# Patient Record
Sex: FEMALE | Race: WHITE | NOT HISPANIC OR LATINO | Employment: OTHER | URBAN - METROPOLITAN AREA
[De-identification: names, ages, dates, MRNs, and addresses within clinical notes are randomized per-mention and may not be internally consistent; named-entity substitution may affect disease eponyms.]

---

## 2023-06-13 DIAGNOSIS — I10 HYPERTENSION, UNSPECIFIED TYPE: Primary | ICD-10-CM

## 2023-06-13 DIAGNOSIS — D64.9 ANEMIA, UNSPECIFIED TYPE: ICD-10-CM

## 2023-06-13 DIAGNOSIS — E13.69 OTHER SPECIFIED DIABETES MELLITUS WITH OTHER SPECIFIED COMPLICATION, UNSPECIFIED WHETHER LONG TERM INSULIN USE (MULTI): ICD-10-CM

## 2023-06-13 RX ORDER — METFORMIN HYDROCHLORIDE 500 MG/1
500 TABLET ORAL 2 TIMES DAILY
Qty: 180 TABLET | Refills: 0 | Status: CANCELLED | OUTPATIENT
Start: 2023-06-13 | End: 2023-09-11

## 2023-06-13 RX ORDER — FOLIC ACID 1 MG/1
1 TABLET ORAL DAILY
COMMUNITY

## 2023-06-13 RX ORDER — FOLIC ACID 1 MG/1
1 TABLET ORAL DAILY
Qty: 90 TABLET | Refills: 0 | Status: CANCELLED | OUTPATIENT
Start: 2023-06-13 | End: 2023-09-11

## 2023-06-13 RX ORDER — FUROSEMIDE 20 MG/1
20 TABLET ORAL DAILY
COMMUNITY

## 2023-06-13 RX ORDER — FUROSEMIDE 20 MG/1
20 TABLET ORAL DAILY
Qty: 90 TABLET | Refills: 0 | Status: CANCELLED | OUTPATIENT
Start: 2023-06-13 | End: 2023-09-11

## 2023-06-13 RX ORDER — METFORMIN HYDROCHLORIDE 500 MG/1
500 TABLET ORAL 2 TIMES DAILY
COMMUNITY
Start: 2023-05-25

## 2023-09-12 ENCOUNTER — PATIENT OUTREACH (OUTPATIENT)
Dept: CARE COORDINATION | Facility: CLINIC | Age: 88
End: 2023-09-12

## 2023-09-12 NOTE — PROGRESS NOTES
Outreach call to patient to support a smooth transition of care from recent admission.  Spoke with Phyllis, reviewed discharge medications, discharge instructions, assessed social needs, and provided education on importance of follow-up appointment with provider.  Will continue to monitor through transition period.    Engagement  Call Start Time: 1011 (9/12/2023 10:11 AM)    Medications  Medications reviewed with patient/caregiver?: Yes (9/12/2023 10:11 AM)  Is the patient having any side effects they believe may be caused by any medication additions or changes?: No (9/12/2023 10:11 AM)  Does the patient have all medications ordered at discharge?: Yes (9/12/2023 10:11 AM)  Care Management Interventions: No intervention needed (9/12/2023 10:11 AM)  Is the patient taking all medications as directed (includes completed medication regime)?: Yes (9/12/2023 10:11 AM)  Medication Comments: Discharge instructions has the patient on Aspirin 81mg but Phyllis states, she has never been on Aspirin. Lety to follow up with the PCP for medication clarification. (9/12/2023 10:11 AM)  Follow Up Tasks: Medication reconciliation issues (Family to follow up regarding aspirin) (9/12/2023 10:11 AM)    Appointments  Does the patient have a primary care provider?: Yes (Phyllis states, she will call and schedule an appointment today) (9/12/2023 10:11 AM)  Care Management Interventions: Advised patient to make appointment (9/12/2023 10:11 AM)    Self Management  What is the home health agency?: [facility] (9/12/2023 10:11 AM)  Has home health visited the patient within 72 hours of discharge?: Not applicable (9/12/2023 10:11 AM)  What Durable Medical Equipment (DME) was ordered?: not applicable (9/12/2023 10:11 AM)    Patient Teaching  Care Management Interventions: Reviewed instructions with patient (9/12/2023 10:11 AM)  What is the patient's perception of their health status since discharge?: Improving (9/12/2023  10:11 AM)  Is the patient/caregiver able to teach back the hierarchy of who to call/visit for symptoms/problems? PCP, Specialist, Home Health nurse, Urgent Care, ED, 911: Yes (9/12/2023 10:11 AM)    Queta Ayoub RN/CM

## 2023-09-25 ENCOUNTER — PATIENT OUTREACH (OUTPATIENT)
Dept: CARE COORDINATION | Facility: CLINIC | Age: 88
End: 2023-09-25

## 2023-09-25 NOTE — PROGRESS NOTES
Outreach call to patient following up on appointment with primary care provider.  Left voicemail message for Phyllis with name and contact number.  Will continue to follow.    Queta Ayoub RN/MARGARITA  [phone]

## 2023-10-31 ENCOUNTER — PATIENT OUTREACH (OUTPATIENT)
Dept: CARE COORDINATION | Facility: CLINIC | Age: 88
End: 2023-10-31

## 2023-10-31 NOTE — PROGRESS NOTES
Outreach call to patient to check in 30 days after hospital discharge to support smooth transition of care.  Left voicemail message for Phyllis with CM name and contact number. No additional outreach needed at this time.   Queta Ayoub RN

## 2024-06-25 ENCOUNTER — APPOINTMENT (OUTPATIENT)
Dept: RADIOLOGY | Facility: HOSPITAL | Age: 89
End: 2024-06-25
Payer: MEDICARE

## 2024-06-25 ENCOUNTER — APPOINTMENT (OUTPATIENT)
Dept: CARDIOLOGY | Facility: HOSPITAL | Age: 89
End: 2024-06-25
Payer: MEDICARE

## 2024-06-25 ENCOUNTER — HOSPITAL ENCOUNTER (EMERGENCY)
Facility: HOSPITAL | Age: 89
Discharge: OTHER INSTITUTION | End: 2024-06-26
Payer: MEDICARE

## 2024-06-25 DIAGNOSIS — J90 PLEURAL EFFUSION: ICD-10-CM

## 2024-06-25 DIAGNOSIS — N39.0 UTI (URINARY TRACT INFECTION) WITH PYURIA: ICD-10-CM

## 2024-06-25 DIAGNOSIS — R79.89 ELEVATED TROPONIN: Primary | ICD-10-CM

## 2024-06-25 DIAGNOSIS — J96.21 ACUTE AND CHRONIC RESPIRATORY FAILURE WITH HYPOXIA (MULTI): ICD-10-CM

## 2024-06-25 LAB
ALBUMIN SERPL BCP-MCNC: 3.7 G/DL (ref 3.4–5)
ALP SERPL-CCNC: 78 U/L (ref 33–136)
ALT SERPL W P-5'-P-CCNC: 17 U/L (ref 7–45)
ANION GAP BLDA CALCULATED.4IONS-SCNC: 10 MMO/L (ref 10–25)
ANION GAP SERPL CALC-SCNC: 14 MMOL/L (ref 10–20)
APPEARANCE UR: ABNORMAL
APTT PPP: 34 SECONDS (ref 27–38)
AST SERPL W P-5'-P-CCNC: 29 U/L (ref 9–39)
BASE EXCESS BLDA CALC-SCNC: 6.5 MMOL/L (ref -2–3)
BASOPHILS # BLD AUTO: 0.03 X10*3/UL (ref 0–0.1)
BASOPHILS NFR BLD AUTO: 0.3 %
BILIRUB SERPL-MCNC: 1.1 MG/DL (ref 0–1.2)
BILIRUB UR STRIP.AUTO-MCNC: NEGATIVE MG/DL
BNP SERPL-MCNC: 776 PG/ML (ref 0–99)
BODY TEMPERATURE: ABNORMAL
BUN SERPL-MCNC: 19 MG/DL (ref 6–23)
CA-I BLDA-SCNC: 1.14 MMOL/L (ref 1.1–1.33)
CALCIUM SERPL-MCNC: 9.5 MG/DL (ref 8.6–10.3)
CARDIAC TROPONIN I PNL SERPL HS: 73 NG/L (ref 0–13)
CARDIAC TROPONIN I PNL SERPL HS: 79 NG/L (ref 0–13)
CHLORIDE BLDA-SCNC: 101 MMOL/L (ref 98–107)
CHLORIDE SERPL-SCNC: 102 MMOL/L (ref 98–107)
CO2 SERPL-SCNC: 26 MMOL/L (ref 21–32)
COLOR UR: ABNORMAL
CREAT SERPL-MCNC: 1.09 MG/DL (ref 0.5–1.05)
EGFRCR SERPLBLD CKD-EPI 2021: 49 ML/MIN/1.73M*2
EOSINOPHIL # BLD AUTO: 0.02 X10*3/UL (ref 0–0.4)
EOSINOPHIL NFR BLD AUTO: 0.2 %
ERYTHROCYTE [DISTWIDTH] IN BLOOD BY AUTOMATED COUNT: 15.2 % (ref 11.5–14.5)
GLUCOSE BLDA-MCNC: 273 MG/DL (ref 74–99)
GLUCOSE SERPL-MCNC: 321 MG/DL (ref 74–99)
GLUCOSE UR STRIP.AUTO-MCNC: ABNORMAL MG/DL
HCO3 BLDA-SCNC: 27.1 MMOL/L (ref 22–26)
HCT VFR BLD AUTO: 43.3 % (ref 36–46)
HCT VFR BLD EST: 42 % (ref 36–46)
HGB BLD-MCNC: 14.5 G/DL (ref 12–16)
HGB BLDA-MCNC: 14 G/DL (ref 12–16)
IMM GRANULOCYTES # BLD AUTO: 0.04 X10*3/UL (ref 0–0.5)
IMM GRANULOCYTES NFR BLD AUTO: 0.5 % (ref 0–0.9)
INHALED O2 CONCENTRATION: 21 %
INR PPP: 1.1 (ref 0.9–1.1)
KETONES UR STRIP.AUTO-MCNC: NEGATIVE MG/DL
LACTATE BLDA-SCNC: 1.5 MMOL/L (ref 0.4–2)
LEUKOCYTE ESTERASE UR QL STRIP.AUTO: ABNORMAL
LYMPHOCYTES # BLD AUTO: 1.06 X10*3/UL (ref 0.8–3)
LYMPHOCYTES NFR BLD AUTO: 12 %
MAGNESIUM SERPL-MCNC: 2.05 MG/DL (ref 1.6–2.4)
MCH RBC QN AUTO: 33.7 PG (ref 26–34)
MCHC RBC AUTO-ENTMCNC: 33.5 G/DL (ref 32–36)
MCV RBC AUTO: 101 FL (ref 80–100)
MONOCYTES # BLD AUTO: 0.34 X10*3/UL (ref 0.05–0.8)
MONOCYTES NFR BLD AUTO: 3.8 %
MUCOUS THREADS #/AREA URNS AUTO: ABNORMAL /LPF
NEUTROPHILS # BLD AUTO: 7.36 X10*3/UL (ref 1.6–5.5)
NEUTROPHILS NFR BLD AUTO: 83.2 %
NITRITE UR QL STRIP.AUTO: NEGATIVE
NRBC BLD-RTO: 0 /100 WBCS (ref 0–0)
OXYHGB MFR BLDA: 93.8 % (ref 94–98)
PCO2 BLDA: 27 MM HG (ref 38–42)
PH BLDA: 7.61 PH (ref 7.38–7.42)
PH UR STRIP.AUTO: 7 [PH]
PLATELET # BLD AUTO: 170 X10*3/UL (ref 150–450)
PO2 BLDA: 68 MM HG (ref 85–95)
POTASSIUM BLDA-SCNC: 3.1 MMOL/L (ref 3.5–5.3)
POTASSIUM SERPL-SCNC: 3.4 MMOL/L (ref 3.5–5.3)
PROT SERPL-MCNC: 7.2 G/DL (ref 6.4–8.2)
PROT UR STRIP.AUTO-MCNC: ABNORMAL MG/DL
PROTHROMBIN TIME: 12.1 SECONDS (ref 9.8–12.8)
RBC # BLD AUTO: 4.3 X10*6/UL (ref 4–5.2)
RBC # UR STRIP.AUTO: ABNORMAL /UL
RBC #/AREA URNS AUTO: ABNORMAL /HPF
SAO2 % BLDA: 96 % (ref 94–100)
SARS-COV-2 RNA RESP QL NAA+PROBE: NOT DETECTED
SODIUM BLDA-SCNC: 135 MMOL/L (ref 136–145)
SODIUM SERPL-SCNC: 139 MMOL/L (ref 136–145)
SP GR UR STRIP.AUTO: 1.01
SQUAMOUS #/AREA URNS AUTO: ABNORMAL /HPF
UROBILINOGEN UR STRIP.AUTO-MCNC: NORMAL MG/DL
WBC # BLD AUTO: 8.9 X10*3/UL (ref 4.4–11.3)
WBC #/AREA URNS AUTO: >50 /HPF

## 2024-06-25 PROCEDURE — 85025 COMPLETE CBC W/AUTO DIFF WBC: CPT | Performed by: PHYSICIAN ASSISTANT

## 2024-06-25 PROCEDURE — 36415 COLL VENOUS BLD VENIPUNCTURE: CPT | Performed by: PHYSICIAN ASSISTANT

## 2024-06-25 PROCEDURE — 96365 THER/PROPH/DIAG IV INF INIT: CPT

## 2024-06-25 PROCEDURE — 36600 WITHDRAWAL OF ARTERIAL BLOOD: CPT

## 2024-06-25 PROCEDURE — 83735 ASSAY OF MAGNESIUM: CPT | Performed by: PHYSICIAN ASSISTANT

## 2024-06-25 PROCEDURE — 99285 EMERGENCY DEPT VISIT HI MDM: CPT | Mod: 25

## 2024-06-25 PROCEDURE — 93005 ELECTROCARDIOGRAM TRACING: CPT

## 2024-06-25 PROCEDURE — 80053 COMPREHEN METABOLIC PANEL: CPT | Performed by: PHYSICIAN ASSISTANT

## 2024-06-25 PROCEDURE — 83880 ASSAY OF NATRIURETIC PEPTIDE: CPT | Performed by: PHYSICIAN ASSISTANT

## 2024-06-25 PROCEDURE — 84484 ASSAY OF TROPONIN QUANT: CPT | Mod: 91 | Performed by: PHYSICIAN ASSISTANT

## 2024-06-25 PROCEDURE — 2500000005 HC RX 250 GENERAL PHARMACY W/O HCPCS: Performed by: PHYSICIAN ASSISTANT

## 2024-06-25 PROCEDURE — 85730 THROMBOPLASTIN TIME PARTIAL: CPT | Performed by: PHYSICIAN ASSISTANT

## 2024-06-25 PROCEDURE — 71045 X-RAY EXAM CHEST 1 VIEW: CPT

## 2024-06-25 PROCEDURE — 85610 PROTHROMBIN TIME: CPT | Performed by: PHYSICIAN ASSISTANT

## 2024-06-25 PROCEDURE — 96367 TX/PROPH/DG ADDL SEQ IV INF: CPT

## 2024-06-25 PROCEDURE — 2500000004 HC RX 250 GENERAL PHARMACY W/ HCPCS (ALT 636 FOR OP/ED): Performed by: PHYSICIAN ASSISTANT

## 2024-06-25 PROCEDURE — 2500000004 HC RX 250 GENERAL PHARMACY W/ HCPCS (ALT 636 FOR OP/ED)

## 2024-06-25 PROCEDURE — 2500000002 HC RX 250 W HCPCS SELF ADMINISTERED DRUGS (ALT 637 FOR MEDICARE OP, ALT 636 FOR OP/ED)

## 2024-06-25 PROCEDURE — 84484 ASSAY OF TROPONIN QUANT: CPT | Performed by: PHYSICIAN ASSISTANT

## 2024-06-25 PROCEDURE — 87086 URINE CULTURE/COLONY COUNT: CPT | Mod: GENLAB | Performed by: PHYSICIAN ASSISTANT

## 2024-06-25 PROCEDURE — 84132 ASSAY OF SERUM POTASSIUM: CPT | Performed by: PHYSICIAN ASSISTANT

## 2024-06-25 PROCEDURE — 96375 TX/PRO/DX INJ NEW DRUG ADDON: CPT

## 2024-06-25 PROCEDURE — 71045 X-RAY EXAM CHEST 1 VIEW: CPT | Performed by: RADIOLOGY

## 2024-06-25 PROCEDURE — 81001 URINALYSIS AUTO W/SCOPE: CPT | Performed by: PHYSICIAN ASSISTANT

## 2024-06-25 PROCEDURE — 9420000001 HC RT PATIENT EDUCATION 5 MIN

## 2024-06-25 PROCEDURE — 87635 SARS-COV-2 COVID-19 AMP PRB: CPT | Performed by: PHYSICIAN ASSISTANT

## 2024-06-25 RX ORDER — LEVOTHYROXINE SODIUM 88 UG/1
88 TABLET ORAL DAILY
Status: ON HOLD | COMMUNITY

## 2024-06-25 RX ORDER — MIRTAZAPINE 7.5 MG/1
7.5 TABLET, FILM COATED ORAL NIGHTLY
Status: ON HOLD | COMMUNITY

## 2024-06-25 RX ORDER — FUROSEMIDE 10 MG/ML
20 INJECTION INTRAMUSCULAR; INTRAVENOUS ONCE
Status: COMPLETED | OUTPATIENT
Start: 2024-06-25 | End: 2024-06-25

## 2024-06-25 RX ORDER — PANTOPRAZOLE SODIUM 40 MG/1
40 TABLET, DELAYED RELEASE ORAL
Status: ON HOLD | COMMUNITY

## 2024-06-25 RX ORDER — HYDRALAZINE HYDROCHLORIDE 20 MG/ML
10 INJECTION INTRAMUSCULAR; INTRAVENOUS ONCE
Status: COMPLETED | OUTPATIENT
Start: 2024-06-25 | End: 2024-06-25

## 2024-06-25 RX ORDER — ATORVASTATIN CALCIUM 80 MG/1
80 TABLET, FILM COATED ORAL DAILY
Status: ON HOLD | COMMUNITY

## 2024-06-25 RX ORDER — AZITHROMYCIN 100 MG/ML
INJECTION, POWDER, LYOPHILIZED, FOR SOLUTION INTRAVENOUS
Status: COMPLETED
Start: 2024-06-25 | End: 2024-06-25

## 2024-06-25 RX ORDER — LOSARTAN POTASSIUM 100 MG/1
100 TABLET ORAL DAILY
Status: ON HOLD | COMMUNITY

## 2024-06-25 RX ORDER — CEFTRIAXONE 1 G/50ML
INJECTION, SOLUTION INTRAVENOUS
Status: COMPLETED
Start: 2024-06-25 | End: 2024-06-25

## 2024-06-25 RX ORDER — CEFTRIAXONE 1 G/50ML
1 INJECTION, SOLUTION INTRAVENOUS ONCE
Status: COMPLETED | OUTPATIENT
Start: 2024-06-25 | End: 2024-06-25

## 2024-06-25 RX ORDER — GLIMEPIRIDE 4 MG/1
4 TABLET ORAL 2 TIMES DAILY
Status: ON HOLD | COMMUNITY

## 2024-06-25 RX ORDER — METHOTREXATE 2.5 MG/1
10 TABLET ORAL
Status: ON HOLD | COMMUNITY

## 2024-06-25 RX ORDER — POTASSIUM CHLORIDE 20 MEQ/1
TABLET, EXTENDED RELEASE ORAL
Status: COMPLETED
Start: 2024-06-25 | End: 2024-06-25

## 2024-06-25 RX ORDER — AMLODIPINE BESYLATE 5 MG/1
5 TABLET ORAL DAILY
Status: ON HOLD | COMMUNITY
End: 2024-06-26 | Stop reason: WASHOUT

## 2024-06-25 RX ORDER — POTASSIUM CHLORIDE 20 MEQ/1
40 TABLET, EXTENDED RELEASE ORAL ONCE
Status: COMPLETED | OUTPATIENT
Start: 2024-06-25 | End: 2024-06-25

## 2024-06-25 ASSESSMENT — PAIN SCALES - GENERAL
PAINLEVEL_OUTOF10: 0 - NO PAIN
PAINLEVEL_OUTOF10: 0 - NO PAIN

## 2024-06-25 ASSESSMENT — COLUMBIA-SUICIDE SEVERITY RATING SCALE - C-SSRS
2. HAVE YOU ACTUALLY HAD ANY THOUGHTS OF KILLING YOURSELF?: NO
1. IN THE PAST MONTH, HAVE YOU WISHED YOU WERE DEAD OR WISHED YOU COULD GO TO SLEEP AND NOT WAKE UP?: NO
6. HAVE YOU EVER DONE ANYTHING, STARTED TO DO ANYTHING, OR PREPARED TO DO ANYTHING TO END YOUR LIFE?: NO

## 2024-06-25 ASSESSMENT — PAIN - FUNCTIONAL ASSESSMENT: PAIN_FUNCTIONAL_ASSESSMENT: 0-10

## 2024-06-25 NOTE — ED PROVIDER NOTES
HPI   Chief Complaint   Patient presents with    Shortness of Breath    Wheezing       89-year-old female with past medical history positive for heart valve replacement-(on eliquis) , ankylosing spondylitis HTN GERD hypothyroidism diabetes and hyperlipidemia here with complaints of increasing shortness of breath and orthopnea over the last 2 days patient specifically denies any fevers no chest pain or pressure    No lightheaded or dizziness no nausea vomiting or diarrhea no abdominal pain or back pain                          Alphonso Coma Scale Score: 15                     Patient History   Past Medical History:   Diagnosis Date    Age-related osteoporosis without current pathological fracture 05/15/2014    Postmenopausal osteoporosis    Ankylosing spondylitis of unspecified sites in spine (Multi) 07/14/2021    Ankylosing spondylitis    Cellulitis of left toe 08/29/2022    Cellulitis of toe of left foot    Encounter for screening for malignant neoplasm of colon     Encounter for screening colonoscopy    Essential (primary) hypertension 11/14/2022    Hypertension    Gastro-esophageal reflux disease without esophagitis 04/10/2017    GERD without esophagitis    Hypothyroidism, unspecified 11/14/2022    Hypothyroidism    Other conditions influencing health status     History of normal mammogram    Pain in unspecified hip 01/11/2017    Joint pain, hip    Personal history of other diseases of the circulatory system 06/02/2021    History of aortic valve stenosis    Pure hypercholesterolemia, unspecified 11/14/2022    Hypercholesterolemia    Type 2 diabetes mellitus without complications (Multi) 11/14/2022    Controlled type 2 diabetes mellitus     Past Surgical History:   Procedure Laterality Date    ANKLE SURGERY  02/14/2014    Ankle Surgery    APPENDECTOMY  02/14/2014    Appendectomy    CHOLECYSTECTOMY  02/14/2014    Cholecystectomy    OTHER SURGICAL HISTORY  03/16/2016    Open Treatment Of Fracture Of Proximal Femoral  Neck    OTHER SURGICAL HISTORY  02/14/2014    Wrist Surgery    TONSILLECTOMY  02/14/2014    Tonsillectomy    TOTAL ABDOMINAL HYSTERECTOMY W/ BILATERAL SALPINGOOPHORECTOMY  02/14/2014    Total Abdominal Hysterectomy With Removal Of Both Ovaries     No family history on file.  Social History     Tobacco Use    Smoking status: Not on file    Smokeless tobacco: Not on file   Substance Use Topics    Alcohol use: Not on file    Drug use: Not on file       Physical Exam   ED Triage Vitals [06/25/24 1615]   Temperature Heart Rate Respirations BP   37.1 °C (98.7 °F) (!) 103 16 (!) 202/122      Pulse Ox Temp src Heart Rate Source Patient Position   (!) 88 % -- -- --      BP Location FiO2 (%)     -- --       Physical Exam  Vitals and nursing note reviewed.   Constitutional:       General: She is not in acute distress.     Appearance: She is well-developed.   HENT:      Head: Normocephalic and atraumatic.      Right Ear: Tympanic membrane, ear canal and external ear normal.      Left Ear: Tympanic membrane, ear canal and external ear normal.      Nose: Congestion present.      Mouth/Throat:      Mouth: Mucous membranes are moist.   Eyes:      Conjunctiva/sclera: Conjunctivae normal.   Cardiovascular:      Rate and Rhythm: Normal rate and regular rhythm.      Heart sounds: No murmur heard.  Pulmonary:      Effort: Pulmonary effort is normal. No respiratory distress.      Comments: Patient noted to have bilateral crackles and rales as well as coarse lung sounds bilaterally upper lung field laboratory movement.  No cyanosis.  Mild peripheral edema calf.  Nontender Homans' sign negative.  Intact distal pulses cap refill less than 2 seconds.  Abdominal:      Palpations: Abdomen is soft.      Tenderness: There is no abdominal tenderness.      Comments: Abdominal soft positive bowel sound nontender no guarding or rebound surgery.   Genitourinary:     Comments: No severe trauma noted.  Musculoskeletal:         General: No swelling.       Cervical back: Neck supple.      Comments: Patient was noted to have kyphosis cervical thoracic lumbar spine nontender able to move arms and legs.   Skin:     General: Skin is warm and dry.      Capillary Refill: Capillary refill takes less than 2 seconds.      Comments: No petechiae nose ecchymosis good skin perfusion and good cap refill.   Neurological:      General: No focal deficit present.      Mental Status: She is alert.      Comments: Able to move arms and legs no arms or leg drift noted.  No facial drooping or drooling no stridor.  Talking clearly.  No motor or sensory deficit neck is supple.   Psychiatric:         Mood and Affect: Mood normal.         ED Course & MDM   Diagnoses as of 06/25/24 2205   Elevated troponin   Acute and chronic respiratory failure with hypoxia (Multi)   UTI (urinary tract infection) with pyuria   Pleural effusion       Medical Decision Making  Patient has had progressive wheezing and increased shortness of breath worse when lying flat,  Troponins were elevated at 73 and 79 BNP elevated at 776 potassium mildly low at 34 she was given a dose of IV Lasix here    X-ray shows a large left pleural effusion patient also noted to have a UTI and her blood pressure continued to be elevated she was given dose of hydralazine here IV Rocephin and azithromycin and Lasix  Currently she is stable on room air with O2 sats in the low to mid 90s ABGs were obtained noted to have low o2 , placed on oxygen     She was accepted at [facility]    Labs Reviewed  CBC WITH AUTO DIFFERENTIAL - Abnormal     WBC                           8.9                    nRBC                          0.0                    RBC                           4.30                   Hemoglobin                    14.5                   Hematocrit                    43.3                   MCV                           101 (*)                MCH                           33.7                   MCHC                          33.5                    RDW                           15.2 (*)               Platelets                     170                    Neutrophils %                 83.2                   Immature Granulocytes %, Automated   0.5                    Lymphocytes %                 12.0                   Monocytes %                   3.8                    Eosinophils %                 0.2                    Basophils %                   0.3                    Neutrophils Absolute          7.36 (*)               Immature Granulocytes Absolute, Au*   0.04                   Lymphocytes Absolute          1.06                   Monocytes Absolute            0.34                   Eosinophils Absolute          0.02                   Basophils Absolute            0.03                COMPREHENSIVE METABOLIC PANEL - Abnormal     Glucose                       321 (*)                Sodium                        139                    Potassium                     3.4 (*)                Chloride                      102                    Bicarbonate                   26                     Anion Gap                     14                     Urea Nitrogen                 19                     Creatinine                    1.09 (*)               eGFR                          49 (*)                 Calcium                       9.5                    Albumin                       3.7                    Alkaline Phosphatase          78                     Total Protein                 7.2                    AST                           29                     Bilirubin, Total              1.1                    ALT                           17                  B-TYPE NATRIURETIC PEPTIDE - Abnormal     BNP                           776 (*)                    Narrative:    <100 pg/mL - Heart failure unlikely                  100-299 pg/mL - Intermediate probability of acute heart                                  failure exacerbation. Correlate with  clinical                                  context and patient history.                    >=300 pg/mL - Heart Failure likely. Correlate with clinical                                  context and patient history.                                    BNP testing is performed using different testing methodology at [facility] than at other system hospitals. Direct result comparisons should only be made within the same method.                     SERIAL TROPONIN-INITIAL - Abnormal     Troponin I, High Sensitivity   79 (*)                     Narrative: Less than 99th percentile of normal range cutoff-                  Female and children under 18 years old <14 ng/L; Male <21 ng/L: Negative                  Repeat testing should be performed if clinically indicated.                                     Female and children under 18 years old 14-50 ng/L; Male 21-50 ng/L:                  Consistent with possible cardiac damage and possible increased clinical                   risk. Serial measurements may help to assess extent of myocardial damage.                                     >50 ng/L: Consistent with cardiac damage, increased clinical risk and                  myocardial infarction. Serial measurements may help assess extent of                   myocardial damage.                                      NOTE: Children less than 1 year old may have higher baseline troponin                   levels and results should be interpreted in conjunction with the overall                   clinical context.                                     NOTE: Troponin I testing is performed using a different                   testing methodology at [facility] than at other                   [facility]. Direct result comparisons should only                   be made within the same method.  URINALYSIS WITH REFLEX CULTURE AND MICROSCOPIC - Abnormal     Color, Urine                                          Appearance, Urine             Turbid (*)               Specific Gravity, Urine       1.010                  pH, Urine                     7.0                    Protein, Urine                20 (TRACE)                Glucose, Urine                500 (3+) (*)               Blood, Urine                  0.1 (1+) (*)               Ketones, Urine                NEGATIVE                Bilirubin, Urine              NEGATIVE                Urobilinogen, Urine           Normal                 Nitrite, Urine                NEGATIVE                Leukocyte Esterase, Urine     500 Cathy/µL (*)            SERIAL TROPONIN, 1 HOUR - Abnormal     Troponin I, High Sensitivity   73 (*)                     Narrative: Less than 99th percentile of normal range cutoff-                  Female and children under 18 years old <14 ng/L; Male <21 ng/L: Negative                  Repeat testing should be performed if clinically indicated.                                     Female and children under 18 years old 14-50 ng/L; Male 21-50 ng/L:                  Consistent with possible cardiac damage and possible increased clinical                   risk. Serial measurements may help to assess extent of myocardial damage.                                     >50 ng/L: Consistent with cardiac damage, increased clinical risk and                  myocardial infarction. Serial measurements may help assess extent of                   myocardial damage.                                      NOTE: Children less than 1 year old may have higher baseline troponin                   levels and results should be interpreted in conjunction with the overall                   clinical context.                                     NOTE: Troponin I testing is performed using a different                   testing methodology at [facility] than at other                   [facility]. Direct result comparisons should only                   be made  within the same method.  MICROSCOPIC ONLY, URINE - Abnormal     WBC, Urine                    >50 (*)                RBC, Urine                    6-10 (*)               Squamous Epithelial Cells, Urine                          Mucus, Urine                  FEW                 BLOOD GAS ARTERIAL FULL PANEL - Abnormal     POCT pH, Arterial             7.61 (*)               POCT pCO2, Arterial           27 (*)                 POCT pO2, Arterial            68 (*)                 POCT SO2, Arterial            96                     POCT Oxy Hemoglobin, Arterial   93.8 (*)               POCT Hematocrit Calculated, Arteri*   42.0                   POCT Sodium, Arterial         135 (*)                POCT Potassium, Arterial      3.1 (*)                POCT Chloride, Arterial       101                    POCT Ionized Calcium, Arterial   1.14                   POCT Glucose, Arterial        273 (*)                POCT Lactate, Arterial        1.5                    POCT Base Excess, Arterial    6.5 (*)                POCT HCO3 Calculated, Arterial   27.1 (*)               POCT Hemoglobin, Arterial     14.0                   POCT Anion Gap, Arterial      10                     Patient Temperature                                  FiO2                          21                  MAGNESIUM - Normal     Magnesium                     2.05                PROTIME-INR - Normal     Protime                       12.1                   INR                           1.1                 APTT - Normal     aPTT                          34                         Narrative: The APTT is no longer used for monitoring Unfractionated Heparin Therapy. For monitoring Heparin Therapy, use the Heparin Assay.  SARS-COV-2 PCR - Normal     Coronavirus 2019, PCR                                    Narrative: This assay has received FDA Emergency Use Authorization (EUA) and is only authorized for the duration of time that circumstances exist to justify  the authorization of the emergency use of in vitro diagnostic tests for the detection of SARS-CoV-2 virus and/or diagnosis of COVID-19 infection under section 564(b)(1) of the Act, 21 U.S.C. 360bbb-3(b)(1). This assay is an in vitro diagnostic nucleic acid amplification test for the qualitative detection of SARS-CoV-2 from nasopharyngeal specimens and has been validated for use at [facility]. Negative results do not preclude COVID-19 infections and should not be used as the sole basis for diagnosis, treatment, or other management decisions.                    URINE CULTURE  TROPONIN SERIES- (INITIAL, 1 HR)         Narrative: The following orders were created for panel order Troponin I Series, High Sensitivity (0, 1 HR).                  Procedure                               Abnormality         Status                                     ---------                               -----------         ------                                     Troponin I, High Sensiti...[[id number]]  Abnormal            Final result                               Troponin, High Sensitivi...[[id number]]  Abnormal            Final result                                                 Please view results for these tests on the individual orders.  URINALYSIS WITH REFLEX CULTURE AND MICROSCOPIC         Narrative: The following orders were created for panel order Urinalysis with Reflex Culture and Microscopic.                  Procedure                               Abnormality         Status                                     ---------                               -----------         ------                                     Urinalysis with Reflex C...[[id number]]  Abnormal            Final result                               Extra Urine Gray Tube[[id number]]                            In process                                                   Please view results for these tests on the individual orders.  EXTRA URINE  "GRAY TUBE    XR chest 1 view   Final Result    1.  Large left pleural effusion with dense left basilar airspace    disease. Underlying pneumonia is in the differential.    2. Hyperinflated lungs.                      MACRO:    None          Signed by: Beny Root 6/25/2024 5:09 PM    Dictation workstation:   [id number]         Amount and/or Complexity of Data Reviewed  ECG/medicine tests: independent interpretation performed.     Details: EKG done at 4:35 PM shows sinus rhythm with PVCs left axis deviation right bundle branch block QT/QTc 394/471\" change compared to previous EKG        Procedure  Procedures     Bel Armstrong PA-C  06/25/24 1647       Bel Armstrong PA-C  06/25/24 2014       Bel Armstrong PA-C  06/25/24 2205       Coco Mathews MD  07/01/24 0653    "

## 2024-06-26 ENCOUNTER — APPOINTMENT (OUTPATIENT)
Dept: RADIOLOGY | Facility: HOSPITAL | Age: 89
End: 2024-06-26
Payer: MEDICARE

## 2024-06-26 ENCOUNTER — HOSPITAL ENCOUNTER (INPATIENT)
Facility: HOSPITAL | Age: 89
Discharge: HOME HEALTH | End: 2024-06-26
Attending: INTERNAL MEDICINE | Admitting: INTERNAL MEDICINE
Payer: MEDICARE

## 2024-06-26 VITALS
TEMPERATURE: 98.7 F | DIASTOLIC BLOOD PRESSURE: 74 MMHG | HEART RATE: 72 BPM | HEIGHT: 59 IN | WEIGHT: 120 LBS | RESPIRATION RATE: 20 BRPM | BODY MASS INDEX: 24.19 KG/M2 | OXYGEN SATURATION: 98 % | SYSTOLIC BLOOD PRESSURE: 167 MMHG

## 2024-06-26 DIAGNOSIS — D64.9 ANEMIA, UNSPECIFIED TYPE: ICD-10-CM

## 2024-06-26 DIAGNOSIS — N18.30 STAGE 3 CHRONIC KIDNEY DISEASE, UNSPECIFIED WHETHER STAGE 3A OR 3B CKD (MULTI): ICD-10-CM

## 2024-06-26 DIAGNOSIS — I25.10 ARTERIOSCLEROSIS OF CORONARY ARTERY: ICD-10-CM

## 2024-06-26 DIAGNOSIS — J90 PLEURAL EFFUSION: ICD-10-CM

## 2024-06-26 DIAGNOSIS — R06.02 SHORTNESS OF BREATH ON EXERTION: ICD-10-CM

## 2024-06-26 DIAGNOSIS — R26.9 GAIT DISORDER: ICD-10-CM

## 2024-06-26 DIAGNOSIS — E89.0 POSTOPERATIVE HYPOTHYROIDISM: ICD-10-CM

## 2024-06-26 DIAGNOSIS — F11.20 OPIOID TYPE DEPENDENCE, CONTINUOUS (MULTI): ICD-10-CM

## 2024-06-26 DIAGNOSIS — N18.9 ANEMIA OF RENAL DISEASE: ICD-10-CM

## 2024-06-26 DIAGNOSIS — F51.01 PRIMARY INSOMNIA: ICD-10-CM

## 2024-06-26 DIAGNOSIS — E11.9 TYPE 2 DIABETES MELLITUS WITHOUT COMPLICATION, UNSPECIFIED WHETHER LONG TERM INSULIN USE (MULTI): ICD-10-CM

## 2024-06-26 DIAGNOSIS — I25.810 CORONARY ARTERY DISEASE INVOLVING CORONARY BYPASS GRAFT, UNSPECIFIED WHETHER ANGINA PRESENT, UNSPECIFIED WHETHER NATIVE OR TRANSPLANTED HEART: ICD-10-CM

## 2024-06-26 DIAGNOSIS — Z11.4 ENCOUNTER FOR SCREENING FOR HIV: ICD-10-CM

## 2024-06-26 DIAGNOSIS — I50.30 HEART FAILURE WITH PRESERVED LEFT VENTRICULAR FUNCTION (HFPEF) (MULTI): ICD-10-CM

## 2024-06-26 DIAGNOSIS — E55.9 VITAMIN D DEFICIENCY: ICD-10-CM

## 2024-06-26 DIAGNOSIS — J45.909 ASTHMA, UNSPECIFIED ASTHMA SEVERITY, UNSPECIFIED WHETHER COMPLICATED, UNSPECIFIED WHETHER PERSISTENT (HHS-HCC): ICD-10-CM

## 2024-06-26 DIAGNOSIS — Z12.31 ENCOUNTER FOR SCREENING MAMMOGRAM FOR MALIGNANT NEOPLASM OF BREAST: ICD-10-CM

## 2024-06-26 DIAGNOSIS — E83.51 HYPOCALCEMIA: ICD-10-CM

## 2024-06-26 DIAGNOSIS — M79.7 FIBROMYALGIA: ICD-10-CM

## 2024-06-26 DIAGNOSIS — F17.210 CIGARETTE NICOTINE DEPENDENCE WITHOUT COMPLICATION: ICD-10-CM

## 2024-06-26 DIAGNOSIS — E03.9 HYPOTHYROIDISM, ACQUIRED: ICD-10-CM

## 2024-06-26 DIAGNOSIS — E11.69 TYPE 2 DIABETES MELLITUS WITH OTHER SPECIFIED COMPLICATION, UNSPECIFIED WHETHER LONG TERM INSULIN USE (MULTI): ICD-10-CM

## 2024-06-26 DIAGNOSIS — D63.1 ANEMIA OF RENAL DISEASE: ICD-10-CM

## 2024-06-26 DIAGNOSIS — I50.32 CHRONIC HEART FAILURE WITH PRESERVED EJECTION FRACTION (MULTI): ICD-10-CM

## 2024-06-26 DIAGNOSIS — K21.9 GASTROESOPHAGEAL REFLUX DISEASE WITHOUT ESOPHAGITIS: ICD-10-CM

## 2024-06-26 DIAGNOSIS — J96.01 ACUTE HYPOXIC RESPIRATORY FAILURE (MULTI): Primary | ICD-10-CM

## 2024-06-26 DIAGNOSIS — E78.00 PURE HYPERCHOLESTEROLEMIA: ICD-10-CM

## 2024-06-26 DIAGNOSIS — I25.738: ICD-10-CM

## 2024-06-26 DIAGNOSIS — I50.9 HEART FAILURE, UNSPECIFIED (MULTI): ICD-10-CM

## 2024-06-26 DIAGNOSIS — K30 ACID INDIGESTION: ICD-10-CM

## 2024-06-26 DIAGNOSIS — K29.70 GASTRITIS WITHOUT BLEEDING, UNSPECIFIED CHRONICITY, UNSPECIFIED GASTRITIS TYPE: ICD-10-CM

## 2024-06-26 DIAGNOSIS — J95.811 POSTPROCEDURAL PNEUMOTHORAX: ICD-10-CM

## 2024-06-26 DIAGNOSIS — K64.9 HEMORRHOIDS, UNSPECIFIED HEMORRHOID TYPE: ICD-10-CM

## 2024-06-26 DIAGNOSIS — Z11.59 ENCOUNTER FOR HEPATITIS C SCREENING TEST FOR LOW RISK PATIENT: ICD-10-CM

## 2024-06-26 DIAGNOSIS — R11.2 NAUSEA AND VOMITING, UNSPECIFIED VOMITING TYPE: ICD-10-CM

## 2024-06-26 DIAGNOSIS — Z86.2 HISTORY OF THROMBOTIC THROMBOCYTOPENIC PURPURA: ICD-10-CM

## 2024-06-26 DIAGNOSIS — F41.9 ANXIETY: ICD-10-CM

## 2024-06-26 DIAGNOSIS — R51.9 INTRACTABLE HEADACHE, UNSPECIFIED CHRONICITY PATTERN, UNSPECIFIED HEADACHE TYPE: ICD-10-CM

## 2024-06-26 DIAGNOSIS — I63.412 CEREBROVASCULAR ACCIDENT (CVA) DUE TO EMBOLISM OF LEFT MIDDLE CEREBRAL ARTERY (MULTI): ICD-10-CM

## 2024-06-26 DIAGNOSIS — I50.32 CHRONIC HEART FAILURE WITH PRESERVED EJECTION FRACTION (HFPEF) (MULTI): ICD-10-CM

## 2024-06-26 DIAGNOSIS — E78.5 HYPERLIPIDEMIA, UNSPECIFIED HYPERLIPIDEMIA TYPE: ICD-10-CM

## 2024-06-26 DIAGNOSIS — R91.8 MASS OF LEFT LUNG: ICD-10-CM

## 2024-06-26 DIAGNOSIS — I10 PRIMARY HYPERTENSION: ICD-10-CM

## 2024-06-26 PROBLEM — G62.9 NEUROPATHY: Status: ACTIVE | Noted: 2024-06-26

## 2024-06-26 PROBLEM — Z96.643 PRESENCE OF ARTIFICIAL HIP JOINT, BILATERAL: Status: ACTIVE | Noted: 2023-04-01

## 2024-06-26 PROBLEM — Z95.2 HISTORY OF AORTIC VALVE REPLACEMENT: Status: ACTIVE | Noted: 2022-07-31

## 2024-06-26 PROBLEM — Z96.652 PRESENCE OF LEFT ARTIFICIAL KNEE JOINT: Status: ACTIVE | Noted: 2023-04-01

## 2024-06-26 PROBLEM — S42.202A CLOSED FRACTURE OF LEFT PROXIMAL HUMERUS: Status: ACTIVE | Noted: 2024-06-26

## 2024-06-26 PROBLEM — D22.5 MELANOCYTIC NEVI OF TRUNK: Status: ACTIVE | Noted: 2022-04-27

## 2024-06-26 PROBLEM — Z95.2 PRESENCE OF PROSTHETIC HEART VALVE: Status: ACTIVE | Noted: 2023-04-01

## 2024-06-26 PROBLEM — I45.10 UNSPECIFIED RIGHT BUNDLE-BRANCH BLOCK: Status: ACTIVE | Noted: 2023-04-01

## 2024-06-26 PROBLEM — M06.9 RHEUMATOID ARTHRITIS (MULTI): Status: ACTIVE | Noted: 2024-06-26

## 2024-06-26 LAB
ALBUMIN SERPL BCP-MCNC: 3.6 G/DL (ref 3.4–5)
ANION GAP SERPL CALC-SCNC: 15 MMOL/L (ref 10–20)
BASE EXCESS BLDV CALC-SCNC: 4.9 MMOL/L (ref -2–3)
BASOPHILS # BLD AUTO: 0.02 X10*3/UL (ref 0–0.1)
BASOPHILS NFR BLD AUTO: 0.2 %
BODY TEMPERATURE: ABNORMAL
BUN SERPL-MCNC: 16 MG/DL (ref 6–23)
CALCIUM SERPL-MCNC: 8.7 MG/DL (ref 8.6–10.3)
CHLORIDE SERPL-SCNC: 103 MMOL/L (ref 98–107)
CLARITY FLD: CLEAR
CO2 SERPL-SCNC: 26 MMOL/L (ref 21–32)
COLOR FLD: YELLOW
CREAT SERPL-MCNC: 1.03 MG/DL (ref 0.5–1.05)
CRP SERPL-MCNC: 1.59 MG/DL
EGFRCR SERPLBLD CKD-EPI 2021: 52 ML/MIN/1.73M*2
EOSINOPHIL # BLD AUTO: 0.07 X10*3/UL (ref 0–0.4)
EOSINOPHIL NFR BLD AUTO: 0.8 %
ERYTHROCYTE [DISTWIDTH] IN BLOOD BY AUTOMATED COUNT: 15.2 % (ref 11.5–14.5)
ERYTHROCYTE [SEDIMENTATION RATE] IN BLOOD BY WESTERGREN METHOD: >130 MM/H (ref 0–30)
GLUCOSE BLD MANUAL STRIP-MCNC: 228 MG/DL (ref 74–99)
GLUCOSE BLD MANUAL STRIP-MCNC: 231 MG/DL (ref 74–99)
GLUCOSE BLD MANUAL STRIP-MCNC: 243 MG/DL (ref 74–99)
GLUCOSE BLD MANUAL STRIP-MCNC: 251 MG/DL (ref 74–99)
GLUCOSE SERPL-MCNC: 209 MG/DL (ref 74–99)
HCO3 BLDV-SCNC: 28.1 MMOL/L (ref 22–26)
HCT VFR BLD AUTO: 42.6 % (ref 36–46)
HGB BLD-MCNC: 14 G/DL (ref 12–16)
HOLD SPECIMEN: NORMAL
IMM GRANULOCYTES # BLD AUTO: 0.04 X10*3/UL (ref 0–0.5)
IMM GRANULOCYTES NFR BLD AUTO: 0.5 % (ref 0–0.9)
INHALED O2 CONCENTRATION: 36 %
INR PPP: 1.1 (ref 0.9–1.1)
LACTATE SERPL-SCNC: 1 MMOL/L (ref 0.4–2)
LDH SERPL L TO P-CCNC: 304 U/L (ref 84–246)
LYMPHOCYTES # BLD AUTO: 1.14 X10*3/UL (ref 0.8–3)
LYMPHOCYTES NFR BLD AUTO: 12.9 %
MAGNESIUM SERPL-MCNC: 1.91 MG/DL (ref 1.6–2.4)
MCH RBC QN AUTO: 34.1 PG (ref 26–34)
MCHC RBC AUTO-ENTMCNC: 32.9 G/DL (ref 32–36)
MCV RBC AUTO: 104 FL (ref 80–100)
MONOCYTES # BLD AUTO: 0.44 X10*3/UL (ref 0.05–0.8)
MONOCYTES NFR BLD AUTO: 5 %
NEUTROPHILS # BLD AUTO: 7.1 X10*3/UL (ref 1.6–5.5)
NEUTROPHILS NFR BLD AUTO: 80.6 %
NRBC BLD-RTO: 0 /100 WBCS (ref 0–0)
OXYHGB MFR BLDV: 81.2 % (ref 45–75)
PCO2 BLDV: 36 MM HG (ref 41–51)
PH BLDV: 7.5 PH (ref 7.33–7.43)
PHOSPHATE SERPL-MCNC: 2.9 MG/DL (ref 2.5–4.9)
PLATELET # BLD AUTO: 157 X10*3/UL (ref 150–450)
PO2 BLDV: 52 MM HG (ref 35–45)
POTASSIUM SERPL-SCNC: 3.6 MMOL/L (ref 3.5–5.3)
PROCALCITONIN SERPL-MCNC: 0.04 NG/ML
PROT SERPL-MCNC: 6.9 G/DL (ref 6.4–8.2)
PROTHROMBIN TIME: 12.4 SECONDS (ref 9.8–12.8)
RBC # BLD AUTO: 4.1 X10*6/UL (ref 4–5.2)
RBC # FLD MANUAL: 7 /UL
SAO2 % BLDV: 82 % (ref 45–75)
SODIUM SERPL-SCNC: 140 MMOL/L (ref 136–145)
WBC # BLD AUTO: 8.8 X10*3/UL (ref 4.4–11.3)
WBC # FLD MANUAL: 102 /UL

## 2024-06-26 PROCEDURE — 94664 DEMO&/EVAL PT USE INHALER: CPT

## 2024-06-26 PROCEDURE — 99223 1ST HOSP IP/OBS HIGH 75: CPT | Performed by: INTERNAL MEDICINE

## 2024-06-26 PROCEDURE — 82042 OTHER SOURCE ALBUMIN QUAN EA: CPT | Mod: GEALAB | Performed by: INTERNAL MEDICINE

## 2024-06-26 PROCEDURE — 2500000004 HC RX 250 GENERAL PHARMACY W/ HCPCS (ALT 636 FOR OP/ED)

## 2024-06-26 PROCEDURE — 99223 1ST HOSP IP/OBS HIGH 75: CPT

## 2024-06-26 PROCEDURE — 83986 ASSAY PH BODY FLUID NOS: CPT | Mod: GEALAB | Performed by: INTERNAL MEDICINE

## 2024-06-26 PROCEDURE — 36415 COLL VENOUS BLD VENIPUNCTURE: CPT

## 2024-06-26 PROCEDURE — 83615 LACTATE (LD) (LDH) ENZYME: CPT | Performed by: INTERNAL MEDICINE

## 2024-06-26 PROCEDURE — 2500000002 HC RX 250 W HCPCS SELF ADMINISTERED DRUGS (ALT 637 FOR MEDICARE OP, ALT 636 FOR OP/ED): Performed by: INTERNAL MEDICINE

## 2024-06-26 PROCEDURE — 82465 ASSAY BLD/SERUM CHOLESTEROL: CPT | Mod: GEALAB | Performed by: INTERNAL MEDICINE

## 2024-06-26 PROCEDURE — 82805 BLOOD GASES W/O2 SATURATION: CPT

## 2024-06-26 PROCEDURE — 80069 RENAL FUNCTION PANEL: CPT

## 2024-06-26 PROCEDURE — 83605 ASSAY OF LACTIC ACID: CPT

## 2024-06-26 PROCEDURE — 9420000001 HC RT PATIENT EDUCATION 5 MIN

## 2024-06-26 PROCEDURE — 94668 MNPJ CHEST WALL SBSQ: CPT

## 2024-06-26 PROCEDURE — 89050 BODY FLUID CELL COUNT: CPT | Performed by: INTERNAL MEDICINE

## 2024-06-26 PROCEDURE — 82947 ASSAY GLUCOSE BLOOD QUANT: CPT | Mod: 91

## 2024-06-26 PROCEDURE — 84155 ASSAY OF PROTEIN SERUM: CPT | Performed by: INTERNAL MEDICINE

## 2024-06-26 PROCEDURE — 2500000002 HC RX 250 W HCPCS SELF ADMINISTERED DRUGS (ALT 637 FOR MEDICARE OP, ALT 636 FOR OP/ED)

## 2024-06-26 PROCEDURE — 85610 PROTHROMBIN TIME: CPT

## 2024-06-26 PROCEDURE — 0W9B3ZZ DRAINAGE OF LEFT PLEURAL CAVITY, PERCUTANEOUS APPROACH: ICD-10-PCS | Performed by: RADIOLOGY

## 2024-06-26 PROCEDURE — 85025 COMPLETE CBC W/AUTO DIFF WBC: CPT

## 2024-06-26 PROCEDURE — 84145 PROCALCITONIN (PCT): CPT | Mod: GEALAB

## 2024-06-26 PROCEDURE — 87449 NOS EACH ORGANISM AG IA: CPT | Mod: GEALAB

## 2024-06-26 PROCEDURE — 88305 TISSUE EXAM BY PATHOLOGIST: CPT | Mod: TC | Performed by: INTERNAL MEDICINE

## 2024-06-26 PROCEDURE — 87040 BLOOD CULTURE FOR BACTERIA: CPT | Mod: 91,GEALAB

## 2024-06-26 PROCEDURE — 94640 AIRWAY INHALATION TREATMENT: CPT

## 2024-06-26 PROCEDURE — 97165 OT EVAL LOW COMPLEX 30 MIN: CPT | Mod: GO

## 2024-06-26 PROCEDURE — 94760 N-INVAS EAR/PLS OXIMETRY 1: CPT

## 2024-06-26 PROCEDURE — 84157 ASSAY OF PROTEIN OTHER: CPT | Mod: GEALAB | Performed by: INTERNAL MEDICINE

## 2024-06-26 PROCEDURE — 2500000001 HC RX 250 WO HCPCS SELF ADMINISTERED DRUGS (ALT 637 FOR MEDICARE OP)

## 2024-06-26 PROCEDURE — 87070 CULTURE OTHR SPECIMN AEROBIC: CPT | Mod: GEALAB | Performed by: INTERNAL MEDICINE

## 2024-06-26 PROCEDURE — 87205 SMEAR GRAM STAIN: CPT | Mod: 91,GEALAB

## 2024-06-26 PROCEDURE — 83735 ASSAY OF MAGNESIUM: CPT

## 2024-06-26 PROCEDURE — 82945 GLUCOSE OTHER FLUID: CPT | Mod: GEALAB | Performed by: INTERNAL MEDICINE

## 2024-06-26 PROCEDURE — 1200000002 HC GENERAL ROOM WITH TELEMETRY DAILY

## 2024-06-26 PROCEDURE — 87899 AGENT NOS ASSAY W/OPTIC: CPT | Mod: GEALAB

## 2024-06-26 PROCEDURE — 32555 ASPIRATE PLEURA W/ IMAGING: CPT

## 2024-06-26 PROCEDURE — 83615 LACTATE (LD) (LDH) ENZYME: CPT | Mod: GEALAB | Performed by: INTERNAL MEDICINE

## 2024-06-26 PROCEDURE — 94667 MNPJ CHEST WALL 1ST: CPT

## 2024-06-26 PROCEDURE — 85652 RBC SED RATE AUTOMATED: CPT

## 2024-06-26 PROCEDURE — 86140 C-REACTIVE PROTEIN: CPT

## 2024-06-26 RX ORDER — FERROUS SULFATE, DRIED 160(50) MG
2 TABLET, EXTENDED RELEASE ORAL 2 TIMES DAILY
Status: ON HOLD | COMMUNITY
Start: 2024-05-08 | End: 2025-05-08

## 2024-06-26 RX ORDER — IPRATROPIUM BROMIDE AND ALBUTEROL SULFATE 2.5; .5 MG/3ML; MG/3ML
3 SOLUTION RESPIRATORY (INHALATION)
Status: DISCONTINUED | OUTPATIENT
Start: 2024-06-26 | End: 2024-06-26

## 2024-06-26 RX ORDER — ASPIRIN 81 MG/1
81 TABLET ORAL DAILY
Status: ON HOLD | COMMUNITY
Start: 2023-11-03

## 2024-06-26 RX ORDER — POTASSIUM CHLORIDE 20 MEQ/1
20 TABLET, EXTENDED RELEASE ORAL DAILY
Status: DISPENSED | OUTPATIENT
Start: 2024-06-27

## 2024-06-26 RX ORDER — MIRTAZAPINE 15 MG/1
7.5 TABLET, FILM COATED ORAL NIGHTLY
Status: DISPENSED | OUTPATIENT
Start: 2024-06-26

## 2024-06-26 RX ORDER — ASPIRIN 81 MG/1
81 TABLET ORAL DAILY
Status: DISPENSED | OUTPATIENT
Start: 2024-06-26

## 2024-06-26 RX ORDER — LANOLIN ALCOHOL/MO/W.PET/CERES
1000 CREAM (GRAM) TOPICAL DAILY
Status: ON HOLD | COMMUNITY

## 2024-06-26 RX ORDER — INSULIN LISPRO 100 [IU]/ML
0-10 INJECTION, SOLUTION INTRAVENOUS; SUBCUTANEOUS
Status: DISPENSED | OUTPATIENT
Start: 2024-06-26

## 2024-06-26 RX ORDER — IPRATROPIUM BROMIDE AND ALBUTEROL SULFATE 2.5; .5 MG/3ML; MG/3ML
3 SOLUTION RESPIRATORY (INHALATION) EVERY 2 HOUR PRN
Status: ACTIVE | OUTPATIENT
Start: 2024-06-26

## 2024-06-26 RX ORDER — FUROSEMIDE 10 MG/ML
20 INJECTION INTRAMUSCULAR; INTRAVENOUS ONCE
Status: COMPLETED | OUTPATIENT
Start: 2024-06-26 | End: 2024-06-26

## 2024-06-26 RX ORDER — SULFAMETHOXAZOLE AND TRIMETHOPRIM 800; 160 MG/1; MG/1
1 TABLET ORAL
Status: ON HOLD | COMMUNITY
Start: 2024-04-13

## 2024-06-26 RX ORDER — PANTOPRAZOLE SODIUM 20 MG/1
20 TABLET, DELAYED RELEASE ORAL
Status: DISPENSED | OUTPATIENT
Start: 2024-06-26

## 2024-06-26 RX ORDER — INSULIN DETEMIR 100 [IU]/ML
10 INJECTION, SOLUTION SUBCUTANEOUS NIGHTLY
Status: ON HOLD | COMMUNITY
Start: 2024-01-23 | End: 2024-06-26 | Stop reason: WASHOUT

## 2024-06-26 RX ORDER — FOLIC ACID 1 MG/1
1 TABLET ORAL DAILY
Status: DISPENSED | OUTPATIENT
Start: 2024-06-26

## 2024-06-26 RX ORDER — ATORVASTATIN CALCIUM 10 MG/1
10 TABLET, FILM COATED ORAL DAILY
Status: DISPENSED | OUTPATIENT
Start: 2024-06-26

## 2024-06-26 RX ORDER — AMOXICILLIN 250 MG
1 CAPSULE ORAL 2 TIMES DAILY
Status: DISPENSED | OUTPATIENT
Start: 2024-06-26

## 2024-06-26 RX ORDER — ENOXAPARIN SODIUM 100 MG/ML
30 INJECTION SUBCUTANEOUS EVERY 24 HOURS
Status: DISPENSED | OUTPATIENT
Start: 2024-06-26

## 2024-06-26 RX ORDER — CONJUGATED ESTROGENS 0.62 MG/G
0.5 CREAM VAGINAL DAILY
Status: ON HOLD | COMMUNITY
Start: 2024-01-23

## 2024-06-26 RX ORDER — INSULIN GLARGINE 100 [IU]/ML
10 INJECTION, SOLUTION SUBCUTANEOUS NIGHTLY
Status: DISPENSED | OUTPATIENT
Start: 2024-06-26

## 2024-06-26 RX ORDER — AMOXICILLIN 250 MG
1 CAPSULE ORAL NIGHTLY
Status: DISCONTINUED | OUTPATIENT
Start: 2024-06-26 | End: 2024-06-26

## 2024-06-26 RX ORDER — LEVOTHYROXINE SODIUM 88 UG/1
88 TABLET ORAL DAILY
Status: DISPENSED | OUTPATIENT
Start: 2024-06-26

## 2024-06-26 RX ORDER — CEFTRIAXONE 1 G/50ML
1 INJECTION, SOLUTION INTRAVENOUS EVERY 24 HOURS
Status: DISCONTINUED | OUTPATIENT
Start: 2024-06-26 | End: 2024-06-27

## 2024-06-26 RX ORDER — DOCUSATE SODIUM 100 MG
100 CAPSULE ORAL 2 TIMES DAILY
Status: ON HOLD | COMMUNITY

## 2024-06-26 RX ORDER — CLOPIDOGREL BISULFATE 75 MG/1
75 TABLET ORAL DAILY
Status: DISPENSED | OUTPATIENT
Start: 2024-06-26

## 2024-06-26 RX ORDER — POTASSIUM CHLORIDE 750 MG/1
20 TABLET, EXTENDED RELEASE ORAL DAILY
Status: ON HOLD | COMMUNITY
Start: 2023-10-27 | End: 2024-06-26 | Stop reason: WASHOUT

## 2024-06-26 RX ORDER — POTASSIUM CHLORIDE 20 MEQ/1
20 TABLET, EXTENDED RELEASE ORAL DAILY
Status: DISCONTINUED | OUTPATIENT
Start: 2024-06-26 | End: 2024-06-26

## 2024-06-26 RX ORDER — CLOPIDOGREL BISULFATE 75 MG/1
75 TABLET ORAL DAILY
Status: ON HOLD | COMMUNITY
Start: 2024-01-09 | End: 2024-06-26 | Stop reason: WASHOUT

## 2024-06-26 RX ORDER — FERROUS GLUCONATE 325 MG
1 TABLET ORAL
Status: ON HOLD | COMMUNITY
Start: 2024-05-24

## 2024-06-26 RX ORDER — LANOLIN ALCOHOL/MO/W.PET/CERES
1000 CREAM (GRAM) TOPICAL DAILY
Status: DISPENSED | OUTPATIENT
Start: 2024-06-26

## 2024-06-26 RX ORDER — CEFTRIAXONE 2 G/50ML
2 INJECTION, SOLUTION INTRAVENOUS EVERY 24 HOURS
Status: DISCONTINUED | OUTPATIENT
Start: 2024-06-26 | End: 2024-06-26

## 2024-06-26 RX ORDER — CRANBERRY FRUIT EXTRACT 250 MG
250 CAPSULE ORAL DAILY
Status: ON HOLD | COMMUNITY
Start: 2023-06-28

## 2024-06-26 RX ORDER — DEXTROSE 50 % IN WATER (D50W) INTRAVENOUS SYRINGE
12.5
Status: ACTIVE | OUTPATIENT
Start: 2024-06-26

## 2024-06-26 RX ORDER — FERROUS SULFATE, DRIED 160(50) MG
1 TABLET, EXTENDED RELEASE ORAL 2 TIMES DAILY
Status: DISPENSED | OUTPATIENT
Start: 2024-06-26

## 2024-06-26 RX ORDER — POTASSIUM CHLORIDE 20 MEQ/1
20 TABLET, EXTENDED RELEASE ORAL 2 TIMES DAILY
Status: COMPLETED | OUTPATIENT
Start: 2024-06-26 | End: 2024-06-26

## 2024-06-26 RX ORDER — FUROSEMIDE 40 MG/1
20 TABLET ORAL DAILY
Status: DISPENSED | OUTPATIENT
Start: 2024-06-26

## 2024-06-26 RX ORDER — LOSARTAN POTASSIUM 50 MG/1
100 TABLET ORAL DAILY
Status: DISPENSED | OUTPATIENT
Start: 2024-06-26

## 2024-06-26 RX ORDER — DEXTROSE 50 % IN WATER (D50W) INTRAVENOUS SYRINGE
25
Status: ACTIVE | OUTPATIENT
Start: 2024-06-26

## 2024-06-26 RX ORDER — IPRATROPIUM BROMIDE AND ALBUTEROL SULFATE 2.5; .5 MG/3ML; MG/3ML
3 SOLUTION RESPIRATORY (INHALATION)
Status: DISPENSED | OUTPATIENT
Start: 2024-06-26

## 2024-06-26 SDOH — ECONOMIC STABILITY: HOUSING INSECURITY
IN THE LAST 12 MONTHS, WAS THERE A TIME WHEN YOU DID NOT HAVE A STEADY PLACE TO SLEEP OR SLEPT IN A SHELTER (INCLUDING NOW)?: NO

## 2024-06-26 SDOH — SOCIAL STABILITY: SOCIAL INSECURITY: ABUSE: ADULT

## 2024-06-26 SDOH — SOCIAL STABILITY: SOCIAL INSECURITY: DO YOU FEEL UNSAFE GOING BACK TO THE PLACE WHERE YOU ARE LIVING?: NO

## 2024-06-26 SDOH — ECONOMIC STABILITY: INCOME INSECURITY: IN THE LAST 12 MONTHS, WAS THERE A TIME WHEN YOU WERE NOT ABLE TO PAY THE MORTGAGE OR RENT ON TIME?: NO

## 2024-06-26 SDOH — SOCIAL STABILITY: SOCIAL INSECURITY: DO YOU FEEL ANYONE HAS EXPLOITED OR TAKEN ADVANTAGE OF YOU FINANCIALLY OR OF YOUR PERSONAL PROPERTY?: NO

## 2024-06-26 SDOH — SOCIAL STABILITY: SOCIAL INSECURITY: ARE YOU OR HAVE YOU BEEN THREATENED OR ABUSED PHYSICALLY, EMOTIONALLY, OR SEXUALLY BY ANYONE?: NO

## 2024-06-26 SDOH — SOCIAL STABILITY: SOCIAL INSECURITY: HAVE YOU HAD ANY THOUGHTS OF HARMING ANYONE ELSE?: NO

## 2024-06-26 SDOH — SOCIAL STABILITY: SOCIAL INSECURITY: WERE YOU ABLE TO COMPLETE ALL THE BEHAVIORAL HEALTH SCREENINGS?: YES

## 2024-06-26 SDOH — SOCIAL STABILITY: SOCIAL INSECURITY: DOES ANYONE TRY TO KEEP YOU FROM HAVING/CONTACTING OTHER FRIENDS OR DOING THINGS OUTSIDE YOUR HOME?: NO

## 2024-06-26 SDOH — ECONOMIC STABILITY: TRANSPORTATION INSECURITY
IN THE PAST 12 MONTHS, HAS LACK OF TRANSPORTATION KEPT YOU FROM MEETINGS, WORK, OR FROM GETTING THINGS NEEDED FOR DAILY LIVING?: NO

## 2024-06-26 SDOH — SOCIAL STABILITY: SOCIAL INSECURITY: HAS ANYONE EVER THREATENED TO HURT YOUR FAMILY OR YOUR PETS?: NO

## 2024-06-26 SDOH — SOCIAL STABILITY: SOCIAL INSECURITY: HAVE YOU HAD THOUGHTS OF HARMING ANYONE ELSE?: NO

## 2024-06-26 SDOH — ECONOMIC STABILITY: TRANSPORTATION INSECURITY
IN THE PAST 12 MONTHS, HAS THE LACK OF TRANSPORTATION KEPT YOU FROM MEDICAL APPOINTMENTS OR FROM GETTING MEDICATIONS?: NO

## 2024-06-26 SDOH — SOCIAL STABILITY: SOCIAL INSECURITY: ARE THERE ANY APPARENT SIGNS OF INJURIES/BEHAVIORS THAT COULD BE RELATED TO ABUSE/NEGLECT?: NO

## 2024-06-26 ASSESSMENT — ENCOUNTER SYMPTOMS
NAUSEA: 0
COUGH: 0
ABDOMINAL PAIN: 0
CHILLS: 0
FEVER: 0
WHEEZING: 0
DIZZINESS: 0
CHEST TIGHTNESS: 1
ABDOMINAL DISTENTION: 0
COUGH: 1
PALPITATIONS: 0
WEAKNESS: 0
SHORTNESS OF BREATH: 1
DIARRHEA: 0

## 2024-06-26 ASSESSMENT — COGNITIVE AND FUNCTIONAL STATUS - GENERAL
MOBILITY SCORE: 16
DRESSING REGULAR LOWER BODY CLOTHING: A LOT
PATIENT BASELINE BEDBOUND: NO
MOVING FROM LYING ON BACK TO SITTING ON SIDE OF FLAT BED WITH BEDRAILS: A LITTLE
EATING MEALS: A LITTLE
TURNING FROM BACK TO SIDE WHILE IN FLAT BAD: A LOT
TOILETING: A LITTLE
HELP NEEDED FOR BATHING: A LOT
STANDING UP FROM CHAIR USING ARMS: A LITTLE
DRESSING REGULAR LOWER BODY CLOTHING: A LOT
WALKING IN HOSPITAL ROOM: A LITTLE
HELP NEEDED FOR BATHING: A LITTLE
DAILY ACTIVITIY SCORE: 19
MOVING TO AND FROM BED TO CHAIR: A LITTLE
DRESSING REGULAR UPPER BODY CLOTHING: A LITTLE
CLIMB 3 TO 5 STEPS WITH RAILING: A LOT
PERSONAL GROOMING: A LITTLE
DAILY ACTIVITIY SCORE: 17
TOILETING: A LITTLE

## 2024-06-26 ASSESSMENT — ACTIVITIES OF DAILY LIVING (ADL)
TOILETING: INDEPENDENT
ASSISTIVE_DEVICE: WALKER
HEARING - RIGHT EAR: FUNCTIONAL
JUDGMENT_ADEQUATE_SAFELY_COMPLETE_DAILY_ACTIVITIES: YES
BATHING: NEEDS ASSISTANCE
GROOMING: NEEDS ASSISTANCE
DRESSING YOURSELF: NEEDS ASSISTANCE
ADEQUATE_TO_COMPLETE_ADL: YES
PATIENT'S MEMORY ADEQUATE TO SAFELY COMPLETE DAILY ACTIVITIES?: YES
ADL_ASSISTANCE: INDEPENDENT
LACK_OF_TRANSPORTATION: NO
HEARING - LEFT EAR: FUNCTIONAL
WALKS IN HOME: NEEDS ASSISTANCE
FEEDING YOURSELF: INDEPENDENT

## 2024-06-26 ASSESSMENT — PATIENT HEALTH QUESTIONNAIRE - PHQ9
2. FEELING DOWN, DEPRESSED OR HOPELESS: NOT AT ALL
SUM OF ALL RESPONSES TO PHQ9 QUESTIONS 1 & 2: 0
1. LITTLE INTEREST OR PLEASURE IN DOING THINGS: NOT AT ALL

## 2024-06-26 ASSESSMENT — PAIN SCALES - GENERAL
PAINLEVEL_OUTOF10: 5 - MODERATE PAIN
PAINLEVEL_OUTOF10: 0 - NO PAIN
PAINLEVEL_OUTOF10: 5 - MODERATE PAIN
PAINLEVEL_OUTOF10: 5 - MODERATE PAIN
PAINLEVEL_OUTOF10: 0 - NO PAIN

## 2024-06-26 ASSESSMENT — PAIN - FUNCTIONAL ASSESSMENT
PAIN_FUNCTIONAL_ASSESSMENT: 0-10

## 2024-06-26 ASSESSMENT — LIFESTYLE VARIABLES
SKIP TO QUESTIONS 9-10: 1
HOW OFTEN DO YOU HAVE A DRINK CONTAINING ALCOHOL: NEVER
AUDIT-C TOTAL SCORE: 0
HOW MANY STANDARD DRINKS CONTAINING ALCOHOL DO YOU HAVE ON A TYPICAL DAY: PATIENT DOES NOT DRINK
HOW OFTEN DO YOU HAVE 6 OR MORE DRINKS ON ONE OCCASION: NEVER
AUDIT-C TOTAL SCORE: 0

## 2024-06-26 NOTE — H&P
History Of Present Illness  Natalya Lira is a 89 y.o. female with T2DM, HTN, HLD, HFpEF 50% ef 7/2021, GERD, hypothyroidism, rheumatoid arthritis on methotrexate, CAD s/p PCI to RCA and LAD, hx aortic stenosis s/p TAVR, CKD3, hx mild cognitive impairment, macular degeneration of eyes, hx dvt and took Eliquis before who presents due to shortness of breath, found with acute hypoxic respiratory failure due to pleural effusion and suspected pneumonia. She is A&O x3 and provided history.     Pt has had shortness of breath since 6/23 and congestion with cough with sputum that she swallows a few days before that. She has also had left side chest being uncomfortable that comes and goes. Denies fever or chills. Says her belly “feels funny”. No diarrhea, has chronic constipation and bowel incontinence, last BM 1 day ago. No leg swelling.   Pt is living at home with her granddaughter Phyllis. She says she has her own “hospital bed”. Does not use home oxygen.     In the ED she had elevated bp 202/122, 103 hr, 88% on room air, 16 rr.   Labs potassium 3.4, Cr 1.09 around baseline, 776 BNP, 79 and 73 troponin. No leukocytosis. Negative covid. Arterial blood gas 7.61, 27 pCO2, 68 PO2.   Was given IV 20 Lasix, hydralazine, IV ceftriaxone, azithromycin in ED.     12 point Review of Systems negative unless stated above.     Allergies: pt unsure. penicillins though pt tolerated unasyn in 2022   Surgical hx: cholecystectomy, hysterectomy, b/l hip surgery, left knee sx  Family hx: father CHF    Social hx: quit tobacco over 40 years ago, no alcohol.     Past Medical History  She has a past medical history of Age-related osteoporosis without current pathological fracture (05/15/2014), Ankylosing spondylitis of unspecified sites in spine (Multi) (07/14/2021), Cellulitis of left toe (08/29/2022), Encounter for screening for malignant neoplasm of colon, Essential (primary) hypertension (11/14/2022), Gastro-esophageal reflux disease without  esophagitis (04/10/2017), Hypothyroidism, unspecified (11/14/2022), Other conditions influencing health status, Pain in unspecified hip (01/11/2017), Personal history of other diseases of the circulatory system (06/02/2021), Pure hypercholesterolemia, unspecified (11/14/2022), and Type 2 diabetes mellitus without complications (Multi) (11/14/2022).    Surgical History  She has a past surgical history that includes Other surgical history (03/16/2016); Appendectomy (02/14/2014); Other surgical history (02/14/2014); Tonsillectomy (02/14/2014); Cholecystectomy (02/14/2014); Total abdominal hysterectomy w/ bilateral salpingoophorectomy (02/14/2014); and Ankle surgery (02/14/2014).     Social History  She reports that she has quit smoking. Her smoking use included cigarettes. She has never used smokeless tobacco. She reports that she does not drink alcohol and does not use drugs.    Family History  No family history on file.     Allergies  Patient has no known allergies.    Review of Systems   Constitutional:  Negative for chills and fever.   Respiratory:  Positive for cough, chest tightness and shortness of breath.    Cardiovascular:  Negative for leg swelling.   Gastrointestinal:  Negative for diarrhea and nausea.   All other systems reviewed and are negative.       Physical Exam  Vitals reviewed.   Constitutional:       General: She is not in acute distress.  HENT:      Head: Normocephalic and atraumatic.   Eyes:      Extraocular Movements: Extraocular movements intact.      Conjunctiva/sclera: Conjunctivae normal.   Cardiovascular:      Rate and Rhythm: Normal rate and regular rhythm.      Heart sounds: No murmur heard.     No friction rub. No gallop.   Pulmonary:      Effort: Pulmonary effort is normal.      Comments: Coarse breath sounds to upper b/l. Decreased to L lower   Abdominal:      General: Bowel sounds are normal.      Palpations: Abdomen is soft. There is no mass.      Tenderness: There is no abdominal  tenderness. There is no guarding or rebound.   Musculoskeletal:         General: No tenderness.      Cervical back: Neck supple.      Right lower leg: No edema.      Left lower leg: No edema.   Skin:     General: Skin is warm and dry.      Findings: No bruising or rash.   Neurological:      Mental Status: She is alert. Mental status is at baseline.      Comments: Answering questions, following commands. Moving all extremities.    Psychiatric:         Mood and Affect: Mood and affect normal.          Last Recorded Vitals  /82 (BP Location: Left arm, Patient Position: Lying)   Pulse 71   Temp 36.1 °C (97 °F) (Tympanic)   Resp 17   Wt 57.2 kg (126 lb 1.7 oz)   SpO2 98%     Relevant Results      Scheduled medications  atorvastatin, 10 mg, oral, Daily  azithromycin, 500 mg, intravenous, q24h  cefTRIAXone, 1 g, intravenous, q24h  folic acid, 1 mg, oral, Daily  furosemide, 20 mg, intravenous, Once  [Held by provider] furosemide, 20 mg, oral, Daily  insulin lispro, 0-10 Units, subcutaneous, TID  levothyroxine, 88 mcg, oral, Daily  mirtazapine, 7.5 mg, oral, Nightly  pantoprazole, 20 mg, oral, Daily before breakfast  psyllium, 1 packet, oral, Daily  sennosides-docusate sodium, 1 tablet, oral, Nightly      Continuous medications     PRN medications  PRN medications: dextrose, dextrose, glucagon, glucagon, oxygen  XR chest 1 view    Result Date: 6/25/2024  Interpreted By:  Beny Root, STUDY: XR CHEST 1 VIEW;  6/25/2024 4:54 pm   INDICATION: Signs/Symptoms:sob.   COMPARISON: 07/03/2023   ACCESSION NUMBER(S): [id number]   ORDERING CLINICIAN: MAKAYLA BANKS   FINDINGS: There is a large left pleural effusion. There is dense left basilar airspace disease. The right lung is clear. The lungs are hypoinflated. There is enlargement of the cardiac silhouette with pulmonary edema       1.  Large left pleural effusion with dense left basilar airspace disease. Underlying pneumonia is in the differential. 2. Hyperinflated  lungs.       MACRO: None   Signed by: Beny Root 6/25/2024 5:09 PM Dictation workstation:   [id number]   Results for orders placed or performed during the hospital encounter of 06/26/24 (from the past 24 hour(s))   CBC and Auto Differential   Result Value Ref Range    WBC 8.8 4.4 - 11.3 x10*3/uL    nRBC 0.0 0.0 - 0.0 /100 WBCs    RBC 4.10 4.00 - 5.20 x10*6/uL    Hemoglobin 14.0 12.0 - 16.0 g/dL    Hematocrit 42.6 36.0 - 46.0 %     (H) 80 - 100 fL    MCH 34.1 (H) 26.0 - 34.0 pg    MCHC 32.9 32.0 - 36.0 g/dL    RDW 15.2 (H) 11.5 - 14.5 %    Platelets 157 150 - 450 x10*3/uL    Neutrophils % 80.6 40.0 - 80.0 %    Immature Granulocytes %, Automated 0.5 0.0 - 0.9 %    Lymphocytes % 12.9 13.0 - 44.0 %    Monocytes % 5.0 2.0 - 10.0 %    Eosinophils % 0.8 0.0 - 6.0 %    Basophils % 0.2 0.0 - 2.0 %    Neutrophils Absolute 7.10 (H) 1.60 - 5.50 x10*3/uL    Immature Granulocytes Absolute, Automated 0.04 0.00 - 0.50 x10*3/uL    Lymphocytes Absolute 1.14 0.80 - 3.00 x10*3/uL    Monocytes Absolute 0.44 0.05 - 0.80 x10*3/uL    Eosinophils Absolute 0.07 0.00 - 0.40 x10*3/uL    Basophils Absolute 0.02 0.00 - 0.10 x10*3/uL   Renal function panel   Result Value Ref Range    Glucose 209 (H) 74 - 99 mg/dL    Sodium 140 136 - 145 mmol/L    Potassium 3.6 3.5 - 5.3 mmol/L    Chloride 103 98 - 107 mmol/L    Bicarbonate 26 21 - 32 mmol/L    Anion Gap 15 10 - 20 mmol/L    Urea Nitrogen 16 6 - 23 mg/dL    Creatinine 1.03 0.50 - 1.05 mg/dL    eGFR 52 (L) >60 mL/min/1.73m*2    Calcium 8.7 8.6 - 10.3 mg/dL    Phosphorus 2.9 2.5 - 4.9 mg/dL    Albumin 3.6 3.4 - 5.0 g/dL   Magnesium   Result Value Ref Range    Magnesium 1.91 1.60 - 2.40 mg/dL   Protime-INR   Result Value Ref Range    Protime 12.4 9.8 - 12.8 seconds    INR 1.1 0.9 - 1.1   Lactate   Result Value Ref Range    Lactate 1.0 0.4 - 2.0 mmol/L   Sedimentation rate, automated   Result Value Ref Range    Sedimentation Rate >130 (H) 0 - 30 mm/h   C-reactive protein   Result Value  Ref Range    C-Reactive Protein 1.59 (H) <1.00 mg/dL        Assessment/Plan   Principal Problem:    Acute hypoxic respiratory failure (Multi)    89f with T2DM, HTN, HLD, HFpEF 50% ef 7/2021, GERD, hypothyroidism, rheumatoid arthritis on methotrexate, CAD s/p PCI to RCA and LAD, hx aortic stenosis s/p TAVR 2021, hx DVT was on Eliquis, CKD3, hx mild cognitive impairment, macular degeneration of eyes, hx dvt and took Eliquis before who presents due to shortness of breath, found with acute hypoxic respiratory failure due to pleural effusion and suspected pneumonia.     #acute hypoxic respiratory failure   #large Left pleural effusion   #suspected CHF exacerbation   #suspected community acquired pneumonia   -normally room air. Dopped to 88% at one point. On 3L on admission   -ceftriaxone and azithro (6/26-), pna workup including procal   -apparently takes 20 po lasix at home daily. Start lasix 20 IV and monitor weights, intake output. Echo   -pulm consult for thoracentesis     #CAD s/p stent - hold aspirin for thora   #hx DVT - more than 1 year ago, was on Eliquis and was advised to stop due to multiple falls.   #hx aortic stenosis s/p TAVR - Evolut Pro+ 26mm 6/29/21 non mechanical.   #T2DM - hold home metformin and glimepiride. SSI   #HTN - hold losartan 100, amlodipine 5 while treating pna / diuresing. Consider restarting as needed   #hx rheumatoid arthritis - Hold methotrexate continue folate   #MDD / insomnia - mirtazapine 7.5  #HLD - atorvastatin 10  #GERD - protonix 20   #hypothyroidism - levothyroxine 88     Fluids: po   Nutrition: diabetic diet   GI prophylaxis: ppi   VTE prophylaxis: SCDs. hold anticoag for now   O2: baseline room air. 3L NC   Code Status: DNR and DNI per pt on admission     Disposition: pt admitted for acute hypoxic respiratory failure due to large pleural effusion suspected pna chf, estimated LOS > 48h          Cindy Corona DO

## 2024-06-26 NOTE — SIGNIFICANT EVENT
UPDATED ASSESSMENT AND PLAN:    Brief HPI     Natalya Lira is a 89 y.o. female with T2DM, HTN, HLD, HFpEF 50% ef 7/2021, GERD, hypothyroidism, rheumatoid arthritis on methotrexate, CAD s/p PCI to RCA and LAD, hx aortic stenosis s/p TAVR, CKD3, hx mild cognitive impairment, macular degeneration of eyes, hx dvt and took Eliquis before who presents due to shortness of breath, found with acute hypoxic respiratory failure due to pleural effusion and suspected pneumonia.     Vitals     Vitals:    06/26/24 1605   BP: 145/67   Pulse: 79   Resp: 18   Temp: 36.1 °C (97 °F)   SpO2:         On Examination     Constitutional:  Awake/Alert/Oriented to person place and time. No distress  Head: Atraumatic, Normocephalic  Eyes: EOMI. GROVER  Neck: Enlarged neck circumference, No JVD  Cardiovascular: Regular rate and rhythm, S1, S2. No extra heart sounds or murmurs  Respiratory: Bilaterally reduced lung sounds  Abdomen: Soft, Nontender, Obese. Bowel sounds appreciated  Musculoskeletal: ROM intact. Muscle strength grossly intact upper and lower extremities 5/5.   Neurological: CNII-XII intact. Sensation grossly intact  Extremities: Bilateral lower extremity edema  Psychiatric: Appropriate mood and affect       Labs     Results for orders placed or performed during the hospital encounter of 06/26/24 (from the past 24 hour(s))   CBC and Auto Differential   Result Value Ref Range    WBC 8.8 4.4 - 11.3 x10*3/uL    nRBC 0.0 0.0 - 0.0 /100 WBCs    RBC 4.10 4.00 - 5.20 x10*6/uL    Hemoglobin 14.0 12.0 - 16.0 g/dL    Hematocrit 42.6 36.0 - 46.0 %     (H) 80 - 100 fL    MCH 34.1 (H) 26.0 - 34.0 pg    MCHC 32.9 32.0 - 36.0 g/dL    RDW 15.2 (H) 11.5 - 14.5 %    Platelets 157 150 - 450 x10*3/uL    Neutrophils % 80.6 40.0 - 80.0 %    Immature Granulocytes %, Automated 0.5 0.0 - 0.9 %    Lymphocytes % 12.9 13.0 - 44.0 %    Monocytes % 5.0 2.0 - 10.0 %    Eosinophils % 0.8 0.0 - 6.0 %    Basophils % 0.2 0.0 - 2.0 %    Neutrophils Absolute 7.10  (H) 1.60 - 5.50 x10*3/uL    Immature Granulocytes Absolute, Automated 0.04 0.00 - 0.50 x10*3/uL    Lymphocytes Absolute 1.14 0.80 - 3.00 x10*3/uL    Monocytes Absolute 0.44 0.05 - 0.80 x10*3/uL    Eosinophils Absolute 0.07 0.00 - 0.40 x10*3/uL    Basophils Absolute 0.02 0.00 - 0.10 x10*3/uL   Renal function panel   Result Value Ref Range    Glucose 209 (H) 74 - 99 mg/dL    Sodium 140 136 - 145 mmol/L    Potassium 3.6 3.5 - 5.3 mmol/L    Chloride 103 98 - 107 mmol/L    Bicarbonate 26 21 - 32 mmol/L    Anion Gap 15 10 - 20 mmol/L    Urea Nitrogen 16 6 - 23 mg/dL    Creatinine 1.03 0.50 - 1.05 mg/dL    eGFR 52 (L) >60 mL/min/1.73m*2    Calcium 8.7 8.6 - 10.3 mg/dL    Phosphorus 2.9 2.5 - 4.9 mg/dL    Albumin 3.6 3.4 - 5.0 g/dL   Magnesium   Result Value Ref Range    Magnesium 1.91 1.60 - 2.40 mg/dL   Protime-INR   Result Value Ref Range    Protime 12.4 9.8 - 12.8 seconds    INR 1.1 0.9 - 1.1   Lactate   Result Value Ref Range    Lactate 1.0 0.4 - 2.0 mmol/L   Sedimentation rate, automated   Result Value Ref Range    Sedimentation Rate >130 (H) 0 - 30 mm/h   C-reactive protein   Result Value Ref Range    C-Reactive Protein 1.59 (H) <1.00 mg/dL   Lactate dehydrogenase   Result Value Ref Range     (H) 84 - 246 U/L   Protein, total   Result Value Ref Range    Total Protein 6.9 6.4 - 8.2 g/dL   BLOOD GAS VENOUS   Result Value Ref Range    POCT pH, Venous 7.50 (H) 7.33 - 7.43 pH    POCT pCO2, Venous 36 (L) 41 - 51 mm Hg    POCT pO2, Venous 52 (H) 35 - 45 mm Hg    POCT SO2, Venous 82 (H) 45 - 75 %    POCT Oxy Hemoglobin, Venous 81.2 (H) 45.0 - 75.0 %    POCT Base Excess, Venous 4.9 (H) -2.0 - 3.0 mmol/L    POCT HCO3 Calculated, Venous 28.1 (H) 22.0 - 26.0 mmol/L    Patient Temperature      FiO2 36 %   POCT GLUCOSE   Result Value Ref Range    POCT Glucose 228 (H) 74 - 99 mg/dL   POCT GLUCOSE   Result Value Ref Range    POCT Glucose 251 (H) 74 - 99 mg/dL   Body Fluid Cell Count   Result Value Ref Range    Color, Fluid  Yellow Colorless, Straw, Yellow    Clarity, Fluid Clear Clear    WBC, Fluid 102 See Comment /uL    RBC, Fluid 7 0  /uL /uL   POCT GLUCOSE   Result Value Ref Range    POCT Glucose 231 (H) 74 - 99 mg/dL        Imaging     US thoracentesis  Result Date: 6/26/2024  Interpreted By:  Albni Urban, STUDY: US THORACENTESIS; 6/26/2024 3:40 pm   INDICATION: Signs/Symptoms:left thoracentesis.   COMPARISON: Chest x-ray from 06/25/2024   ACCESSION NUMBER(S): [id number]   ORDERING CLINICIAN: BRENDA CHARLES   TECHNIQUE: CONSENT: The patient was informed of the nature of the proposed procedure. The purposes, alternatives, risks, and benefits were explained and discussed. All questions were answered and consent was obtained.   SEDATION: None   MEDICATION/CONTRAST: No additional   TIME OUT: A time out was performed immediately prior to procedure start with the interventional team, correctly identifying the patient name, date of birth, MRN, procedure, anatomy (including marking of site and side), patient position, procedure consent form, relevant laboratory and imaging test results, antibiotic administration, safety precautions, and procedure-specific equipment needs.   FINDINGS: The patient was placed in the sitting position.   The pleural space was examined with grey scale ultrasound, and the most accessible fluid identified and marked for thoracentesis.   The skin was prepped and draped in usual manner. Local anesthesia with Lidocaine was administered and a  left-sided thoracentesis was performed. A Meusoniceh needle over catheter was then placed where marked. Approximately 1300 mL of serosanguineous colored fluid was removed. The needle/catheter was then withdrawn.   The patient tolerated the procedure well and there were no immediate complications. Specimen sent to the laboratory for further evaluation, per the requesting team.       Uneventful  left-sided thoracentesis, as detailed above .   Signed by: Albin Urban 6/26/2024 3:53 PM  Dictation workstation:   [id number]    XR chest 1 view  Result Date: 6/25/2024  1.  Large left pleural effusion with dense left basilar airspace disease. Underlying pneumonia is in the differential. 2. Hyperinflated lungs.       MACRO: None   Signed by: Beny Root 6/25/2024 5:09 PM Dictation workstation:   [id number]       Assessment and Plan     - Patient was seen and examined at bedside. 89f with T2DM, HTN, HLD, HFpEF 50% ef 7/2021, GERD, hypothyroidism, rheumatoid arthritis on methotrexate, CAD s/p PCI to RCA and LAD, hx aortic stenosis s/p TAVR 2021, hx DVT was on Eliquis, CKD3, hx mild cognitive impairment, macular degeneration of eyes, hx dvt and took Eliquis before who presents due to shortness of breath, found with acute hypoxic respiratory failure due to pleural effusion and suspected pneumonia.     ACUTE MEDICAL ISSUES:  # Acute hypoxic respiratory failure   # Large Left pleural effusion   # Suspected CHF exacerbation   # Suspected community acquired pneumonia   - Sx: Nonresolving shortness of breath  - Labs: No leukocytosis, elevated BNP at 776, elevated CRP at 1.59.  Troponins 79 --> 73. Procal pending.  Strep pneumo, Legionella pending.  Urine culture drawn on 06/25 pending, blood cultures drawn on 06/26 pending  - Imaging:  CXR Large left pleural effusion with dense left basilar airspace disease. Underlying pneumonia is in the differential.   - Echo from 08/02/2021: LVEF 50%, mildly dilated LA, moderate LVH, normal appearance TAVR, mild periprosthetic aortic regurgitstion, moderate MR, mild to moderate TR, moderate pulm HTN  - Abx: Ceftriaxone 1g Q24Hr  (6/26 - 06/30) and Azithro 500 mg every day  (6/26 - 06/28)   - Tx: Lasix 20 mg every day.  On potassium 20 mEq supplement for diuretic induced hypokalemia  [ ] Echo ordered; Pending  - Pulm consulted: Patient to get IR guided thoracentesis today on 06/26/2024.  - Resp Regimen: Duoneb, [place] + IS  - Supportive: I/O, 1500 ml fluid  restriction, 2 g Na restriction, supplemental oxygen to maintain O2 sats > 90%    CHRONIC MEDICAL ISSUES:  # CAD s/p stent - Continue home aspirin 81 mg every day and Plavix 75 mg every day   # hx DVT - more than 1 year ago, was on Eliquis and was advised to stop due to multiple falls.   # hx aortic stenosis s/p TAVR - Evolut Pro+ 26mm 6/29/21 non mechanical.   # T2DM - hold home Glimepiride 4 mg twice daily. SSI #2 (0 to 10 units 3 times daily)  # HTN - On home losartan 100 mg every day   # hx rheumatoid arthritis - Hold methotrexate, continue folate 1 mg every day  # MDD / insomnia - On mirtazapine 7.5 nightly  # HLD - On atorvastatin 10 mg every day   # GERD - On protonix 20 mg every day   # Hypothyroidism - On levothyroxine 88 mcg every day    # Hypovitaminosis: On Vit B12 1000 mcg every day, folate 1 mg every day  # Constipation: On Roberta-Colace 8.6 - 50 mg 1 tab nightly     Fluids: None 2/2 PO Intake  Electrolytes: Replete as needed   Nutrition: Cardiac + Diabetic  GI/Ulcer Prophylaxis: protonix 20 mg every day   Antibiotics: Ceftriaxone 1g Q24Hr  (6/26 - 06/30) and Azithro 500 mg every day  (6/26 - 06/28)   DVT Prophylaxis: SCD; Lovenox 30 mg subcutaneous every day   IV access: 20 G PIV in rt. antecubital  Catheter: None  O2: Weaned down to RA    Disposition: Expect length of stay >48 hours.    Shira Escamilla MD  Internal Medicine, PGY- 1  06/26/24 at 5:55 PM     Disclaimer: Documentation completed with the information available at the time of input. The times in the chart may not be reflective of actual patient care times, interventions, or procedures. Documentation occurs after the physical care of the patient.

## 2024-06-26 NOTE — CONSULTS
"    Department of Medicine  Division of Pulmonary, Critical Care, and Sleep Medicine  Location  [facility]    Reason for consult: \"large L pleural effusion maybe parapneumonic\"  Requesting physician: Cindy Corona DO    Physician HPI (6/26/2024):  89 y.o. female admitted on 6/26/2024  3:39 AM for shortness of breath. She has a remote smoking history.  She is minimally ambulatory at home, and lives with family.  She states that her breathing started getting worse on Sunday.  She denies any fevers, cough, sputum production or feeling otherwise ill.  She also has a past medical history of HFpEF, rheumatoid arthritis on methotrexate, CAD, aortic stenosis status post TAVR, CKD stage III.  She also was on Eliquis at some time in the past due to DVT.  Upon arrival, she was without leukocytosis, mildly hypoxic to 88%, and chest x-ray revealed a large left-sided pleural effusion. She states she would like to get her pleural fluid drained to get her breathing feeling better.    PMH:  Past Medical History:   Diagnosis Date    Age-related osteoporosis without current pathological fracture 05/15/2014    Postmenopausal osteoporosis    Ankylosing spondylitis of unspecified sites in spine (Multi) 07/14/2021    Ankylosing spondylitis    Cellulitis of left toe 08/29/2022    Cellulitis of toe of left foot    Encounter for screening for malignant neoplasm of colon     Encounter for screening colonoscopy    Essential (primary) hypertension 11/14/2022    Hypertension    Gastro-esophageal reflux disease without esophagitis 04/10/2017    GERD without esophagitis    Hypothyroidism, unspecified 11/14/2022    Hypothyroidism    Other conditions influencing health status     History of normal mammogram    Pain in unspecified hip 01/11/2017    Joint pain, hip    Personal history of other diseases of the circulatory system 06/02/2021    History of aortic valve stenosis    Pure hypercholesterolemia, unspecified 11/14/2022    " Hypercholesterolemia    Type 2 diabetes mellitus without complications (Multi) 11/14/2022    Controlled type 2 diabetes mellitus       PSH:  Past Surgical History:   Procedure Laterality Date    ANKLE SURGERY  02/14/2014    Ankle Surgery    APPENDECTOMY  02/14/2014    Appendectomy    CHOLECYSTECTOMY  02/14/2014    Cholecystectomy    OTHER SURGICAL HISTORY  03/16/2016    Open Treatment Of Fracture Of Proximal Femoral Neck    OTHER SURGICAL HISTORY  02/14/2014    Wrist Surgery    TONSILLECTOMY  02/14/2014    Tonsillectomy    TOTAL ABDOMINAL HYSTERECTOMY W/ BILATERAL SALPINGOOPHORECTOMY  02/14/2014    Total Abdominal Hysterectomy With Removal Of Both Ovaries       FHx:  No family history on file.    Social Hx:  Social History     Socioeconomic History    Marital status:      Spouse name: None    Number of children: None    Years of education: None    Highest education level: None   Occupational History    None   Tobacco Use    Smoking status: Former     Types: Cigarettes    Smokeless tobacco: Never   Vaping Use    Vaping status: Never Used   Substance and Sexual Activity    Alcohol use: Never    Drug use: Never    Sexual activity: None   Other Topics Concern    None   Social History Narrative    None     Social Determinants of Health     Financial Resource Strain: Medium Risk (6/26/2024)    Overall Financial Resource Strain (CARDIA)     Difficulty of Paying Living Expenses: Somewhat hard   Food Insecurity: No Food Insecurity (4/18/2023)    Received from [facility]    Hunger Vital Sign     Worried About Running Out of Food in the Last Year: Never true     Ran Out of Food in the Last Year: Never true   Transportation Needs: No Transportation Needs (6/26/2024)    PRAPARE - Transportation     Lack of Transportation (Medical): No     Lack of Transportation (Non-Medical): No   Physical Activity: Not on file   Stress: Not on file   Social Connections: Not on file   Intimate Partner Violence: Not on file   Housing  Stability: Low Risk  (6/26/2024)    Housing Stability Vital Sign     Unable to Pay for Housing in the Last Year: No     Number of Places Lived in the Last Year: 1     Unstable Housing in the Last Year: No       Immunization History:  Immunization History   Administered Date(s) Administered    DTaP, Unspecified 03/17/2016    Flu vaccine, quadrivalent, high-dose, preservative free, age 65y+ (FLUZONE) 09/10/2020, 09/28/2021    Influenza, High Dose Seasonal, Preservative Free 10/05/2015, 09/13/2017, 09/08/2018, 09/20/2019, 10/02/2022, 09/11/2023    Moderna SARS-CoV-2 Vaccination 01/01/2021, 01/23/2021, 02/17/2021, 11/21/2021    Novel influenza-H1N1-09, preservative-free 01/14/2010    Pfizer COVID-19 vaccine, Fall 2023, 12 years and older, (30mcg/0.3mL) 10/26/2023    Pneumococcal Conjugate PCV 7 01/01/2015    Pneumococcal conjugate vaccine, 13-valent (PREVNAR 13) 04/17/2015    Pneumococcal polysaccharide vaccine, 23-valent, age 2 years and older (PNEUMOVAX 23) 10/18/2019    Tdap vaccine, age 7 year and older (BOOSTRIX, ADACEL) 04/29/2023    Varicella Zoster Immune Globulin 01/01/2014       Current Medications:  Scheduled medications  [Held by provider] aspirin, 81 mg, oral, Daily  atorvastatin, 10 mg, oral, Daily  azithromycin, 500 mg, intravenous, q24h  calcium carbonate-vitamin D3, 1 tablet, oral, BID  cefTRIAXone, 1 g, intravenous, q24h  [Held by provider] clopidogrel, 75 mg, oral, Daily  cyanocobalamin, 1,000 mcg, oral, Daily  folic acid, 1 mg, oral, Daily  [Held by provider] furosemide, 20 mg, oral, Daily  insulin glargine, 10 Units, subcutaneous, Nightly  insulin lispro, 0-10 Units, subcutaneous, TID  levothyroxine, 88 mcg, oral, Daily  mirtazapine, 7.5 mg, oral, Nightly  pantoprazole, 20 mg, oral, Daily before breakfast  potassium chloride CR, 20 mEq, oral, BID  [START ON 6/27/2024] potassium chloride CR, 20 mEq, oral, Daily  psyllium, 1 packet, oral, Daily  sennosides-docusate sodium, 1 tablet, oral,  "Nightly      Continuous medications     PRN medications  PRN medications: dextrose, dextrose, glucagon, glucagon, oxygen     Drug Allergies/Intolerances:  No Known Allergies     Review of Systems:  Review of Systems   Constitutional:  Negative for fever.   Respiratory:  Positive for shortness of breath. Negative for cough and wheezing.    Cardiovascular:  Negative for chest pain and leg swelling.        All other review of systems are negative and/or non-contributory.    Physical Examination:      6/26/2024     2:00 AM 6/26/2024     2:30 AM 6/26/2024     4:16 AM 6/26/2024     6:00 AM 6/26/2024    10:17 AM 6/26/2024    11:00 AM 6/26/2024     3:03 PM   Vitals   Systolic 158 167 150  147  124   Diastolic 76 74 82  90  66   Heart Rate 70 72 71  83 77 80   Temp   36.1 °C (97 °F)  36.1 °C (97 °F)     Resp 19 20 17  20 18 19   Height (in)   1.499 m (4' 11.02\")       Weight (lb)   126.1 126.1      BMI   25.46 kg/m2 25.46 kg/m2      BSA (m2)   1.54 m2 1.54 m2            GEN: non-toxic appearing. No respiratory distress  CV: RRR, systolic murmur  LUNGS: moderate effort, nearly absent breath sounds on left  EXT: no edema, cyanosis, clubbing      Pulmonary Function Test Results     None    Exacerbation History     N/A    Imaging     CXR 6/25/2024:  1.  Large left pleural effusion with dense left basilar airspace  disease. Underlying pneumonia is in the differential.  2. Hyperinflated lungs.    CT chest 9/9/2023:  Small left pleural effusion      Bronchoscopy     None    Labs     Results for orders placed or performed during the hospital encounter of 06/26/24 (from the past 24 hour(s))   CBC and Auto Differential   Result Value Ref Range    WBC 8.8 4.4 - 11.3 x10*3/uL    nRBC 0.0 0.0 - 0.0 /100 WBCs    RBC 4.10 4.00 - 5.20 x10*6/uL    Hemoglobin 14.0 12.0 - 16.0 g/dL    Hematocrit 42.6 36.0 - 46.0 %     (H) 80 - 100 fL    MCH 34.1 (H) 26.0 - 34.0 pg    MCHC 32.9 32.0 - 36.0 g/dL    RDW 15.2 (H) 11.5 - 14.5 %    Platelets " 157 150 - 450 x10*3/uL    Neutrophils % 80.6 40.0 - 80.0 %    Immature Granulocytes %, Automated 0.5 0.0 - 0.9 %    Lymphocytes % 12.9 13.0 - 44.0 %    Monocytes % 5.0 2.0 - 10.0 %    Eosinophils % 0.8 0.0 - 6.0 %    Basophils % 0.2 0.0 - 2.0 %    Neutrophils Absolute 7.10 (H) 1.60 - 5.50 x10*3/uL    Immature Granulocytes Absolute, Automated 0.04 0.00 - 0.50 x10*3/uL    Lymphocytes Absolute 1.14 0.80 - 3.00 x10*3/uL    Monocytes Absolute 0.44 0.05 - 0.80 x10*3/uL    Eosinophils Absolute 0.07 0.00 - 0.40 x10*3/uL    Basophils Absolute 0.02 0.00 - 0.10 x10*3/uL   Renal function panel   Result Value Ref Range    Glucose 209 (H) 74 - 99 mg/dL    Sodium 140 136 - 145 mmol/L    Potassium 3.6 3.5 - 5.3 mmol/L    Chloride 103 98 - 107 mmol/L    Bicarbonate 26 21 - 32 mmol/L    Anion Gap 15 10 - 20 mmol/L    Urea Nitrogen 16 6 - 23 mg/dL    Creatinine 1.03 0.50 - 1.05 mg/dL    eGFR 52 (L) >60 mL/min/1.73m*2    Calcium 8.7 8.6 - 10.3 mg/dL    Phosphorus 2.9 2.5 - 4.9 mg/dL    Albumin 3.6 3.4 - 5.0 g/dL   Magnesium   Result Value Ref Range    Magnesium 1.91 1.60 - 2.40 mg/dL   Protime-INR   Result Value Ref Range    Protime 12.4 9.8 - 12.8 seconds    INR 1.1 0.9 - 1.1   Lactate   Result Value Ref Range    Lactate 1.0 0.4 - 2.0 mmol/L   Sedimentation rate, automated   Result Value Ref Range    Sedimentation Rate >130 (H) 0 - 30 mm/h   C-reactive protein   Result Value Ref Range    C-Reactive Protein 1.59 (H) <1.00 mg/dL   BLOOD GAS VENOUS   Result Value Ref Range    POCT pH, Venous 7.50 (H) 7.33 - 7.43 pH    POCT pCO2, Venous 36 (L) 41 - 51 mm Hg    POCT pO2, Venous 52 (H) 35 - 45 mm Hg    POCT SO2, Venous 82 (H) 45 - 75 %    POCT Oxy Hemoglobin, Venous 81.2 (H) 45.0 - 75.0 %    POCT Base Excess, Venous 4.9 (H) -2.0 - 3.0 mmol/L    POCT HCO3 Calculated, Venous 28.1 (H) 22.0 - 26.0 mmol/L    Patient Temperature      FiO2 36 %   POCT GLUCOSE   Result Value Ref Range    POCT Glucose 228 (H) 74 - 99 mg/dL   POCT GLUCOSE   Result  Value Ref Range    POCT Glucose 251 (H) 74 - 99 mg/dL         Echocardiogram     August 2021:  EF 50%       ASSESSMENT & PLAN     Summary:  89 y.o. female admitted on 6/26/2024  3:39 AM presenting with shortness of breath found to have a large left pleural effusion.  Pleural effusion was first noticed in September 2023 and at that time was small.  This is likely the cause of her dyspnea.  It does warrant sampling.  Due to frail status and difficulties with positioning, I asked interventional radiology to perform a procedure in the radiology suite.  Pleural fluid studies have been ordered.    Problem list:  Patient Active Problem List   Diagnosis    Acute hypoxic respiratory failure (Multi)    Rheumatoid arthritis (Multi)    Arteriosclerosis of coronary artery    Chronic heart failure with preserved ejection fraction (HFpEF) (Multi)    Closed fracture of left proximal humerus    Gastritis    Gastroesophageal reflux disease without esophagitis    History of aortic valve replacement    Hypothyroidism, acquired    Melanocytic nevi of trunk    Neuropathy    Presence of artificial hip joint, bilateral    Presence of left artificial knee joint    Presence of prosthetic heart valve    Pure hypercholesterolemia    Type 2 diabetes mellitus (Multi)    Unspecified right bundle-branch block    Vitamin D deficiency        Recommendations:  -thoracentesis with IR  -follow up pleural fluid studies    Taylor Davis DO  Staff Physician - Pulmonary & Critical Care  06/26/24 3:31 PM

## 2024-06-26 NOTE — CONSULTS
Inpatient consult to Cardiology  Consult performed by: Barb Coto, LORNE-CNP  Consult ordered by: Ray Frank DO  Reason for consult: CHF          History Of Present Illness  Natalya Lira is a 89 y.o. female presenting with complaints of shortness of breath. She states the shortness of breath started a few days ago and has progressively gotten worse. She does report chest pain to the left side of her sternum which has been constant. She denies any leg edema.     Lab work in the ER showed glucose 321, sodium 139, potassium 3.4, BUN/creatinine 19/1.09, magnesium 2.05, , troponin 79, 73, INR 1.1, WBCs 8.9, H&H 14.5/43.3, UA showed possible UTI, chest x-ray showed large left pleural effusion with dense left basilar airspace disease.    EKG showed sinus rhythm, right bundle branch block, inferior and anterolateral infarct.       Past Medical History  Past Medical History:   Diagnosis Date    Age-related osteoporosis without current pathological fracture 05/15/2014    Postmenopausal osteoporosis    Ankylosing spondylitis of unspecified sites in spine (Multi) 07/14/2021    Ankylosing spondylitis    Cellulitis of left toe 08/29/2022    Cellulitis of toe of left foot    Encounter for screening for malignant neoplasm of colon     Encounter for screening colonoscopy    Essential (primary) hypertension 11/14/2022    Hypertension    Gastro-esophageal reflux disease without esophagitis 04/10/2017    GERD without esophagitis    Hypothyroidism, unspecified 11/14/2022    Hypothyroidism    Other conditions influencing health status     History of normal mammogram    Pain in unspecified hip 01/11/2017    Joint pain, hip    Personal history of other diseases of the circulatory system 06/02/2021    History of aortic valve stenosis    Pure hypercholesterolemia, unspecified 11/14/2022    Hypercholesterolemia    Type 2 diabetes mellitus without complications (Multi) 11/14/2022    Controlled type 2 diabetes mellitus    CAD status post PCI/PAMELA to LAD and RCA, hypertension, PVD, aortic stenosis status post TAVR, diabetes, hyperlipidemia, chronic diastolic CHF    Surgical History  Past Surgical History:   Procedure Laterality Date    ANKLE SURGERY  02/14/2014    Ankle Surgery    APPENDECTOMY  02/14/2014    Appendectomy    CHOLECYSTECTOMY  02/14/2014    Cholecystectomy    OTHER SURGICAL HISTORY  03/16/2016    Open Treatment Of Fracture Of Proximal Femoral Neck    OTHER SURGICAL HISTORY  02/14/2014    Wrist Surgery    TONSILLECTOMY  02/14/2014    Tonsillectomy    TOTAL ABDOMINAL HYSTERECTOMY W/ BILATERAL SALPINGOOPHORECTOMY  02/14/2014    Total Abdominal Hysterectomy With Removal Of Both Ovaries        Social History  She reports that she has quit smoking. Her smoking use included cigarettes. She has never used smokeless tobacco. She reports that she does not drink alcohol and does not use drugs.    Family History  No family history on file.     Allergies  Patient has no known allergies.    Review of Systems  Review of Systems   Constitutional:  Negative for chills and fever.   Respiratory:  Positive for cough and shortness of breath.    Cardiovascular:  Positive for chest pain. Negative for palpitations and leg swelling.   Gastrointestinal:  Negative for abdominal distention and abdominal pain.   Neurological:  Negative for dizziness and weakness.   All other systems reviewed and are negative.         Physical Exam  Constitutional: Well developed, awake/alert/oriented x3, no distress, alert and cooperative  Eyes: PERRL, EOMI, clear sclera  ENMT: mucous membranes moist, no apparent injury, no lesions seen  Head/Neck: Neck supple, no apparent injury, thyroid without mass or tenderness, No JVD, trachea midline, no bruits  Respiratory/Thorax: Patent airways, CTAB, normal breath sounds with good chest expansion, thorax symmetric  Cardiovascular: Regular, rate and rhythm, no murmurs, 2+ equal pulses of the extremities, normal S 1and S  "2  Gastrointestinal: Nondistended, soft, non-tender, no rebound tenderness or guarding, no masses palpable, no organomegaly, +BS, no bruits  Musculoskeletal: ROM intact, no joint swelling, normal strength  Extremities: normal extremities, no cyanosis edema, contusions or wounds, no clubbing  Neurological: alert and oriented x3, intact senses, motor, response and reflexes, normal strength  Lymphatic: No significant lymphadenopathy  Psychological: Appropriate mood and behavior  Skin: Warm and dry, no lesions, no rashes       Last Recorded Vitals  Blood pressure 124/66, pulse 80, temperature 36.1 °C (97 °F), temperature source Temporal, resp. rate 19, height 1.499 m (4' 11.02\"), weight 57.2 kg (126 lb 1.7 oz), SpO2 95%.    Relevant Results    Scheduled medications  [Held by provider] aspirin, 81 mg, oral, Daily  atorvastatin, 10 mg, oral, Daily  azithromycin, 500 mg, intravenous, q24h  calcium carbonate-vitamin D3, 1 tablet, oral, BID  cefTRIAXone, 1 g, intravenous, q24h  [Held by provider] clopidogrel, 75 mg, oral, Daily  cyanocobalamin, 1,000 mcg, oral, Daily  folic acid, 1 mg, oral, Daily  [Held by provider] furosemide, 20 mg, oral, Daily  insulin glargine, 10 Units, subcutaneous, Nightly  insulin lispro, 0-10 Units, subcutaneous, TID  levothyroxine, 88 mcg, oral, Daily  mirtazapine, 7.5 mg, oral, Nightly  pantoprazole, 20 mg, oral, Daily before breakfast  potassium chloride CR, 20 mEq, oral, BID  [START ON 6/27/2024] potassium chloride CR, 20 mEq, oral, Daily  psyllium, 1 packet, oral, Daily  sennosides-docusate sodium, 1 tablet, oral, Nightly      Continuous medications     PRN medications  PRN medications: dextrose, dextrose, glucagon, glucagon, oxygen    Results for orders placed or performed during the hospital encounter of 06/26/24 (from the past 24 hour(s))   CBC and Auto Differential   Result Value Ref Range    WBC 8.8 4.4 - 11.3 x10*3/uL    nRBC 0.0 0.0 - 0.0 /100 WBCs    RBC 4.10 4.00 - 5.20 x10*6/uL    " Hemoglobin 14.0 12.0 - 16.0 g/dL    Hematocrit 42.6 36.0 - 46.0 %     (H) 80 - 100 fL    MCH 34.1 (H) 26.0 - 34.0 pg    MCHC 32.9 32.0 - 36.0 g/dL    RDW 15.2 (H) 11.5 - 14.5 %    Platelets 157 150 - 450 x10*3/uL    Neutrophils % 80.6 40.0 - 80.0 %    Immature Granulocytes %, Automated 0.5 0.0 - 0.9 %    Lymphocytes % 12.9 13.0 - 44.0 %    Monocytes % 5.0 2.0 - 10.0 %    Eosinophils % 0.8 0.0 - 6.0 %    Basophils % 0.2 0.0 - 2.0 %    Neutrophils Absolute 7.10 (H) 1.60 - 5.50 x10*3/uL    Immature Granulocytes Absolute, Automated 0.04 0.00 - 0.50 x10*3/uL    Lymphocytes Absolute 1.14 0.80 - 3.00 x10*3/uL    Monocytes Absolute 0.44 0.05 - 0.80 x10*3/uL    Eosinophils Absolute 0.07 0.00 - 0.40 x10*3/uL    Basophils Absolute 0.02 0.00 - 0.10 x10*3/uL   Renal function panel   Result Value Ref Range    Glucose 209 (H) 74 - 99 mg/dL    Sodium 140 136 - 145 mmol/L    Potassium 3.6 3.5 - 5.3 mmol/L    Chloride 103 98 - 107 mmol/L    Bicarbonate 26 21 - 32 mmol/L    Anion Gap 15 10 - 20 mmol/L    Urea Nitrogen 16 6 - 23 mg/dL    Creatinine 1.03 0.50 - 1.05 mg/dL    eGFR 52 (L) >60 mL/min/1.73m*2    Calcium 8.7 8.6 - 10.3 mg/dL    Phosphorus 2.9 2.5 - 4.9 mg/dL    Albumin 3.6 3.4 - 5.0 g/dL   Magnesium   Result Value Ref Range    Magnesium 1.91 1.60 - 2.40 mg/dL   Protime-INR   Result Value Ref Range    Protime 12.4 9.8 - 12.8 seconds    INR 1.1 0.9 - 1.1   Lactate   Result Value Ref Range    Lactate 1.0 0.4 - 2.0 mmol/L   Sedimentation rate, automated   Result Value Ref Range    Sedimentation Rate >130 (H) 0 - 30 mm/h   C-reactive protein   Result Value Ref Range    C-Reactive Protein 1.59 (H) <1.00 mg/dL   BLOOD GAS VENOUS   Result Value Ref Range    POCT pH, Venous 7.50 (H) 7.33 - 7.43 pH    POCT pCO2, Venous 36 (L) 41 - 51 mm Hg    POCT pO2, Venous 52 (H) 35 - 45 mm Hg    POCT SO2, Venous 82 (H) 45 - 75 %    POCT Oxy Hemoglobin, Venous 81.2 (H) 45.0 - 75.0 %    POCT Base Excess, Venous 4.9 (H) -2.0 - 3.0 mmol/L     POCT HCO3 Calculated, Venous 28.1 (H) 22.0 - 26.0 mmol/L    Patient Temperature      FiO2 36 %   POCT GLUCOSE   Result Value Ref Range    POCT Glucose 228 (H) 74 - 99 mg/dL   POCT GLUCOSE   Result Value Ref Range    POCT Glucose 251 (H) 74 - 99 mg/dL       US thoracentesis    (Results Pending)          Assessment/Plan   Echocardiogram from January 2024, LVEF 50%, left atrial enlargement, normal functioning TAVR, severe mitral annular calcification with mild mitral stenosis, moderate MR, mild TR, moderate LVH and abnormal LV relaxation, mild pulmonary hypertension    Acute on chronic diastolic CHF  -  -CXR showed large left pleural effusion  -I reviewed the Echocardiogram as above  -Strict I & Os  -Daily weights  -2gm na diet  -1500mL fluid restriction  -Cont IV lasix  -Pulmonary consulted for possible thoracentesis    2. Elevated troponin, chest pain-Non MI elevated troponin 2/2 CHF  -[facility] in 2021 showed patent LAD stent, s/p PCI/PAMELA to mid RCA  -EKG reviewed, old inferior and anterolateral infarct  -Troponin downtrending  -Cont asa  -Plavix on hold for thoracentesis  -Cont statin     3. Possible pneumonia  -CXR showed large pleural effusion, possible pneumonia  -Procal pending  -antibiotics  -bronchodilators  -oxygen support  -sputum culture  -blood cultures    4. Hypertension  -stable  -2gm na diet  -cont meds  -monitor    5. Possible UTI  -Urine culture  -Antibiotics      Barb Coto, LORNE-CNP

## 2024-06-26 NOTE — CONSULTS
Pharmacy Medication History Review    Natalya Lira is a 89 y.o. female admitted for Acute hypoxic respiratory failure (Multi). Pharmacy reviewed the patient's [id number] medications and allergies for accuracy.    The list below reflectives the updated PTA list. Please review each medication in order reconciliation for additional clarification and justification.  Medications Prior to Admission   Medication Sig Dispense Refill Last Dose    aspirin 81 mg EC tablet Take 1 tablet (81 mg) by mouth once daily.       calcium carbonate-vitamin D3 500 mg-5 mcg (200 unit) tablet Take 2 tablets by mouth 2 times a day.       cranberry fruit extract (cranberry extract) 250 mg capsule Take 250 mg by mouth once daily.       ferrous gluconate (Fergon) 324 (38 Fe) mg tablet Take 1 tablet (324 mg) by mouth early in the morning..       Premarin vaginal cream Insert 0.5 g into the vagina once daily.       sulfamethoxazole-trimethoprim (Bactrim DS) 800-160 mg tablet Take 1 tablet by mouth every 12 hours. To take for 7 days       atorvastatin (Lipitor) 80 mg tablet Take 1 tablet (80 mg) by mouth once daily.       cyanocobalamin (Vitamin B-12) 1,000 mcg tablet Take 1 tablet (1,000 mcg) by mouth once daily.       folic acid (Folvite) 1 mg tablet Take 1 tablet (1 mg) by mouth once daily.       glimepiride (Amaryl) 4 mg tablet Take 1 tablet (4 mg) by mouth 2 times a day.       levothyroxine (Synthroid, Levoxyl) 88 mcg tablet Take 1 tablet (88 mcg) by mouth early in the morning.. Take on an empty stomach at the same time each day, either 30 to 60 minutes prior to breakfast       losartan (Cozaar) 100 mg tablet Take 1 tablet (100 mg) by mouth once daily.       methotrexate (Trexall) 2.5 mg tablet Take 4 tablets (10 mg total) by mouth 1 (one) time per week.  Follow directions carefully, and ask to explain any part you do not understand. Take exactly as directed.       mirtazapine (Remeron) 7.5 mg tablet Take 1 tablet (7.5 mg) by mouth  once daily at bedtime.       pantoprazole (ProtoNix) 40 mg EC tablet Take 1 tablet (40 mg) by mouth once daily in the morning. Take before meals. Do not crush, chew, or split.       Stool Softener 100 mg capsule Take 1 capsule (100 mg) by mouth 2 times a day.           The list below reflectives the updated allergy list. Please review each documented allergy for additional clarification and justification.  Allergies  Reviewed by Therese Pretty RN on 6/26/2024   No Known Allergies       August BoxD  PGY1 Pharmacy Resident

## 2024-06-26 NOTE — PROGRESS NOTES
06/26/24 1506   Discharge Planning   Living Arrangements Other (Comment)  (Granddaughter Phyllis lives with patient)   Support Systems Family members   Assistance Needed X3, independent in ADLS, at times assist from family, has a rollator and shower chair   Type of Residence Private residence   Number of Stairs to Enter Residence 5   Number of Stairs Within Residence 0   Do you have animals or pets at home? No   Who is requesting discharge planning? Provider   Home or Post Acute Services In home services   Type of Home Care Services Home nursing visits;Home OT;Home PT   Patient expects to be discharged to: Home with new HHC(SN, PT, OT), awaiting name of agency from granddaughter   Does the patient need discharge transport arranged? No   Financial Resource Strain   How hard is it for you to pay for the very basics like food, housing, medical care, and heating? Somewhat   Housing Stability   In the last 12 months, was there a time when you were not able to pay the mortgage or rent on time? N   In the last 12 months, how many places have you lived? 1   In the last 12 months, was there a time when you did not have a steady place to sleep or slept in a shelter (including now)? N   Transportation Needs   In the past 12 months, has lack of transportation kept you from medical appointments or from getting medications? no   In the past 12 months, has lack of transportation kept you from meetings, work, or from getting things needed for daily living? No

## 2024-06-26 NOTE — HOSPITAL COURSE
Brief HPI:  Natalya Lira is a 89 y.o. female with T2DM, HTN, HLD, HFpEF 50% ef 7/2021, GERD, hypothyroidism, rheumatoid arthritis on methotrexate, CAD s/p PCI to RCA and LAD, hx aortic stenosis s/p TAVR, CKD3, hx mild cognitive impairment, macular degeneration of eyes, hx dvt and took Eliquis before who presents due to shortness of breath.     Hospital Course:  ED course close patient was significant for high blood pressure of 202/122, 103 heart rate, 88% on room air which is why she was subsequently placed on 3 L.  Her BNP was elevated at 776.  Troponin were initially elevated at 79 down trended to 73.  Imaging was significant for large left-sided pleural effusion with dense left basilar airspace disease.  Patient was given IV Lasix once, hydralazine for high blood pressure, placed on azithromycin and ceftriaxone and transferred to the floor.  On floors, she was continued to be managed for concern for CAP with azithromycin and ceftriaxone.  Pulm was consulted for thoracentesis.  Patient subsequently had IR guided thoracentesis on 06/26/2024.  Cardiology was also consulted and an echo was ordered for her. It showed LVEF 50%, impaired relaxation pattern of LV diastolic filling, normal right ventricular global systolic function, LA mildly dilated, moderate left pleural effusion, moderate to severe mitral annular calcification, bioprosthetic aortic valve which exhibits normal function. CT chest was ordered for lung mass and showed consolidative airspace opacity in the left upper lobe with few scattered surrounding ground-glass opacities is most likely favored to represent pneumonia likely corresponding to the rounded airspace opacity seen on immediate prior radiograph. Also showed Moderate volume left-sided pleural effusion. D/t concern for effusion forming empyema, she had repeat thora done on 06/28/2024. Subsequently patient developed ex vacou pneumothorax. Chest tube was placed and patient was monitored inpatient.  Chest tube was clamped on 06/30/2024 for next 24 hr per CT surgery recs.     On discharge patient was instructed to continue taking PO Augmentin with a stop date on July 5, 2024. She was instructed to follow up with Pulmonology and PCP after discharge for pleural fluid cytology results and lung mass/consolidation which will need further imaging in 3 to 6 weeks for monitoring. Patient is to follow up outpatient with Thoracic Surgery upon discharge in a month.     She was hemodynamically stable for discharge on 7/2/2024.

## 2024-06-26 NOTE — PROGRESS NOTES
Occupational Therapy    Evaluation    Patient Name: Natalya Lira  MRN: [id number]  Today's Date: 6/26/2024  Time Calculation  Start Time: 1010  Stop Time: 1039  Time Calculation (min): 29 min    Assessment  IP OT Assessment  OT Assessment: Pt presents with SOB, decreased balance, strength, and endurance. Pt could from OT services to work on ADL's, transfers, and mobility  Prognosis: Good  Barriers to Discharge: None  Evaluation/Treatment Tolerance: Patient tolerated treatment well  Medical Staff Made Aware: Yes  End of Session Communication: Bedside nurse  End of Session Patient Position: Up in chair, Alarm on  Plan:  Treatment Interventions: ADL retraining, Functional transfer training, UE strengthening/ROM, Endurance training  OT Frequency: 2 times per week  OT Discharge Recommendations: Low intensity level of continued care  Equipment Recommended upon Discharge: Wheeled walker (pt owns)  OT Recommended Transfer Status:  (CGA)  OT - OK to Discharge: Yes (per OT POC)    Subjective   Current Problem:  1. Acute hypoxic respiratory failure (Multi)  CANCELED: Transthoracic Echo (TTE) Complete    CANCELED: Transthoracic Echo (TTE) Complete        General:  General  Reason for Referral: Pt is a 88 y/o woman who was admitted for acute hypoxic respiratory failure  Past Medical History Relevant to Rehab: PMHx: T2DM, HTN, HLD, HFpEF 50% ef 7/2021, GERD, hypothyroidism, rheumatoid arthritis on methotrexate, CAD s/p PCI to RCA and LAD, hx aortic stenosis s/p TAVR, CKD3, hx mild cognitive impairment, macular degeneration of eyes, hx dvt  Family/Caregiver Present: No  Prior to Session Communication: Bedside nurse  Patient Position Received: Bed, 3 rail up, Alarm on  General Comment: Pt was pleasant and agreeable to OT eval.  Precautions:  Medical Precautions: Fall precautions (pure-wick, telemetry)  Vital Signs:  SpO2:  (86-96% during ambulation)  Pain:  Pain Assessment  Pain Assessment: 0-10  0-10 (Numeric) Pain Score: 0 - No  pain    Objective   Cognition:  Overall Cognitive Status:  (pt has mild cognitive impairment at baseline)  Orientation Level: Oriented X4     Home Living:  Type of Home: House  Lives With: Other (Comment) (granddaughter lives with her)  Home Adaptive Equipment: Cane, Walker rolling or standard, Hospital bed, Reacher, Sock aid (rollator)  Home Layout: One level  Home Access: Ramped entrance, Other (Comment) (4-5 RUCHI with B HR (pt prefers to do stairs vs use ramp))  Bathroom Shower/Tub: Walk-in shower  Bathroom Toilet: Standard  Bathroom Equipment: Shower chair with back, Other (Comment) (pole in bathroom between toilet and shower she uses as a grab bar)   Prior Function:  Level of [place]: Independent with ADLs and functional transfers, Needs assistance with homemaking  Receives Help From: Family  ADL Assistance: Independent (pt uses AE for LB dressing)  Homemaking Assistance: Needs assistance  Ambulatory Assistance: Independent (uses rollator)  Prior Function Comments: -driving; granddtr does cleaning and cooking    ADL:  LE Dressing Assistance: Moderate  LE Dressing Deficit: Requires assistive device for steadying, Supervision/safety, Steadying (assist to don socks on both feet and start brief over feet. Pt typically uses a reacher and sock aide for LB dressing)  Activity Tolerance:  Endurance: Tolerates less than 10 min exercise with changes in vital signs (SpO2 dropped to 86% during mobility on RA)  Bed Mobility/Transfers:   Bed Mobility  Bed Mobility: Yes  Bed Mobility 1  Bed Mobility 1: Supine to sitting  Level of Assistance 1: Modified independent  Bed Mobility Comments 1: use of the bedrail and increased time    Transfers  Transfer: Yes  Transfer 1  Transfer From 1: Sit to, Stand to  Transfer to 1: Sit, Stand  Technique 1: Sit to stand, Stand to sit  Transfer Device 1: Walker  Transfer Level of Assistance 1: Contact guard  Transfers 2  Transfer From 2: Bed to  Transfer to 2: Chair with arms  Technique 2:  Stand pivot  Transfer Device 2: Walker  Transfer Level of Assistance 2: Contact guard    Functional Mobility:  Functional Mobility  Functional Mobility Performed: Yes  Functional Mobility 1  Surface 1: Level tile  Device 1: Rolling walker  Assistance 1: Contact guard  Comments 1: Pt ambulated within the room with FWW at [facility]  Sitting Balance:  Static Sitting Balance  Static Sitting-Balance Support: Feet supported, Bilateral upper extremity supported  Static Sitting-Level of Assistance: Distant supervision  Standing Balance:  Static Standing Balance  Static Standing-Balance Support: Bilateral upper extremity supported  Static Standing-Level of Assistance: Close supervision  Dynamic Standing Balance  Dynamic Standing-Balance Support: Bilateral upper extremity supported  Dynamic Standing-Comments: CGA with FWW    Sensation:  Light Touch: No apparent deficits  Strength:  Strength Comments: B UE's: WFL     Extremities: RUE   RUE : Within Functional Limits and LUE   LUE: Within Functional Limits    Outcome Measures: [facility] Daily Activity  Putting on and taking off regular lower body clothing: A lot  Bathing (including washing, rinsing, drying): A lot  Putting on and taking off regular upper body clothing: None  Toileting, which includes using toilet, bedpan or urinal: A little  Taking care of personal grooming such as brushing teeth: None  Eating Meals: None  Daily Activity - Total Score: 19      Education Documentation  Body Mechanics, taught by Maddie Altman, OT at 6/26/2024 11:25 AM.  Learner: Patient  Readiness: Acceptance  Method: Explanation  Response: Verbalizes Understanding, Demonstrated Understanding  Comment: Educated on pacing and pursed lip breathing    Education Comments  No comments found.      Goals:   Encounter Problems       Encounter Problems (Active)       OT Goals       Pt to demonstrate transfers with FWW at mod I        Start:  06/26/24    Expected End:  07/10/24            Pt will demo  increased functional mobility to tolerate tasks necessary to complete ADL routine with FWW at mod I while keeping SpO2 above 88%       Start:  06/26/24    Expected End:  07/10/24            Pt will increase endurance to tolerate 15 min of activity with no more than 1 rest break in order to increase ability to engage in ADL completion.        Start:  06/26/24    Expected End:  07/10/24            Pt to demonstrate LB dressing, bathing, and toileting at mod I        Start:  06/26/24    Expected End:  07/10/24            Pt will demo good static/dynamic standing balance during ADL and functional activity with FWW for improved independence and participation in ADL        Start:  06/26/24    Expected End:  07/10/24

## 2024-06-27 ENCOUNTER — APPOINTMENT (OUTPATIENT)
Dept: RADIOLOGY | Facility: HOSPITAL | Age: 89
End: 2024-06-27
Payer: MEDICARE

## 2024-06-27 PROBLEM — J90 PLEURAL EFFUSION: Status: ACTIVE | Noted: 2024-06-27

## 2024-06-27 PROBLEM — R91.8 MASS OF LEFT LUNG: Status: ACTIVE | Noted: 2024-06-27

## 2024-06-27 LAB
ALBUMIN FLD-MCNC: 2.5 G/DL
ALBUMIN SERPL BCP-MCNC: 3.1 G/DL (ref 3.4–5)
ANION GAP SERPL CALC-SCNC: 13 MMOL/L (ref 10–20)
BACTERIA SPEC RESP CULT: ABNORMAL
BUN SERPL-MCNC: 17 MG/DL (ref 6–23)
CALCIUM SERPL-MCNC: 8.7 MG/DL (ref 8.6–10.3)
CHLORIDE SERPL-SCNC: 100 MMOL/L (ref 98–107)
CHOLEST FLD-MCNC: 59 MG/DL
CO2 SERPL-SCNC: 30 MMOL/L (ref 21–32)
CREAT SERPL-MCNC: 1.19 MG/DL (ref 0.5–1.05)
EGFRCR SERPLBLD CKD-EPI 2021: 44 ML/MIN/1.73M*2
ERYTHROCYTE [DISTWIDTH] IN BLOOD BY AUTOMATED COUNT: 15 % (ref 11.5–14.5)
GLUCOSE BLD MANUAL STRIP-MCNC: 119 MG/DL (ref 74–99)
GLUCOSE BLD MANUAL STRIP-MCNC: 210 MG/DL (ref 74–99)
GLUCOSE BLD MANUAL STRIP-MCNC: 274 MG/DL (ref 74–99)
GLUCOSE BLD MANUAL STRIP-MCNC: 86 MG/DL (ref 74–99)
GLUCOSE FLD-MCNC: 249 MG/DL
GLUCOSE SERPL-MCNC: 148 MG/DL (ref 74–99)
GRAM STN SPEC: ABNORMAL
HCT VFR BLD AUTO: 41.3 % (ref 36–46)
HGB BLD-MCNC: 13.5 G/DL (ref 12–16)
LDH FLD L TO P-CCNC: 119 U/L
LEGIONELLA AG UR QL: NEGATIVE
MAGNESIUM SERPL-MCNC: 1.77 MG/DL (ref 1.6–2.4)
MCH RBC QN AUTO: 32.4 PG (ref 26–34)
MCHC RBC AUTO-ENTMCNC: 32.7 G/DL (ref 32–36)
MCV RBC AUTO: 99 FL (ref 80–100)
NRBC BLD-RTO: 0 /100 WBCS (ref 0–0)
PH FLD: 7.03 [PH]
PHOSPHATE SERPL-MCNC: 3.4 MG/DL (ref 2.5–4.9)
PLATELET # BLD AUTO: 139 X10*3/UL (ref 150–450)
POTASSIUM SERPL-SCNC: 3.6 MMOL/L (ref 3.5–5.3)
PROT FLD-MCNC: 4.1 G/DL
RBC # BLD AUTO: 4.17 X10*6/UL (ref 4–5.2)
S PNEUM AG UR QL: NEGATIVE
SODIUM SERPL-SCNC: 139 MMOL/L (ref 136–145)
WBC # BLD AUTO: 8.3 X10*3/UL (ref 4.4–11.3)

## 2024-06-27 PROCEDURE — 2550000001 HC RX 255 CONTRASTS: Performed by: INTERNAL MEDICINE

## 2024-06-27 PROCEDURE — 97161 PT EVAL LOW COMPLEX 20 MIN: CPT | Mod: GP

## 2024-06-27 PROCEDURE — 99232 SBSQ HOSP IP/OBS MODERATE 35: CPT

## 2024-06-27 PROCEDURE — 2500000001 HC RX 250 WO HCPCS SELF ADMINISTERED DRUGS (ALT 637 FOR MEDICARE OP)

## 2024-06-27 PROCEDURE — 71045 X-RAY EXAM CHEST 1 VIEW: CPT

## 2024-06-27 PROCEDURE — 36415 COLL VENOUS BLD VENIPUNCTURE: CPT

## 2024-06-27 PROCEDURE — 99233 SBSQ HOSP IP/OBS HIGH 50: CPT | Performed by: INTERNAL MEDICINE

## 2024-06-27 PROCEDURE — 71260 CT THORAX DX C+: CPT

## 2024-06-27 PROCEDURE — 2500000002 HC RX 250 W HCPCS SELF ADMINISTERED DRUGS (ALT 637 FOR MEDICARE OP, ALT 636 FOR OP/ED)

## 2024-06-27 PROCEDURE — 2500000002 HC RX 250 W HCPCS SELF ADMINISTERED DRUGS (ALT 637 FOR MEDICARE OP, ALT 636 FOR OP/ED): Performed by: INTERNAL MEDICINE

## 2024-06-27 PROCEDURE — 94668 MNPJ CHEST WALL SBSQ: CPT

## 2024-06-27 PROCEDURE — 2500000004 HC RX 250 GENERAL PHARMACY W/ HCPCS (ALT 636 FOR OP/ED)

## 2024-06-27 PROCEDURE — 83735 ASSAY OF MAGNESIUM: CPT

## 2024-06-27 PROCEDURE — 71260 CT THORAX DX C+: CPT | Performed by: STUDENT IN AN ORGANIZED HEALTH CARE EDUCATION/TRAINING PROGRAM

## 2024-06-27 PROCEDURE — 85027 COMPLETE CBC AUTOMATED: CPT

## 2024-06-27 PROCEDURE — 82947 ASSAY GLUCOSE BLOOD QUANT: CPT | Mod: 91

## 2024-06-27 PROCEDURE — 94640 AIRWAY INHALATION TREATMENT: CPT

## 2024-06-27 PROCEDURE — 2500000001 HC RX 250 WO HCPCS SELF ADMINISTERED DRUGS (ALT 637 FOR MEDICARE OP): Performed by: NURSE PRACTITIONER

## 2024-06-27 PROCEDURE — 82374 ASSAY BLOOD CARBON DIOXIDE: CPT

## 2024-06-27 PROCEDURE — 1200000002 HC GENERAL ROOM WITH TELEMETRY DAILY

## 2024-06-27 PROCEDURE — 97530 THERAPEUTIC ACTIVITIES: CPT | Mod: GP

## 2024-06-27 PROCEDURE — 71045 X-RAY EXAM CHEST 1 VIEW: CPT | Performed by: STUDENT IN AN ORGANIZED HEALTH CARE EDUCATION/TRAINING PROGRAM

## 2024-06-27 PROCEDURE — 94760 N-INVAS EAR/PLS OXIMETRY 1: CPT

## 2024-06-27 PROCEDURE — 9420000001 HC RT PATIENT EDUCATION 5 MIN

## 2024-06-27 RX ORDER — AMOXICILLIN AND CLAVULANATE POTASSIUM 875; 125 MG/1; MG/1
1 TABLET, FILM COATED ORAL 2 TIMES DAILY
Status: DISPENSED | OUTPATIENT
Start: 2024-06-27 | End: 2024-07-04

## 2024-06-27 ASSESSMENT — COGNITIVE AND FUNCTIONAL STATUS - GENERAL
TOILETING: A LOT
STANDING UP FROM CHAIR USING ARMS: A LITTLE
MOVING FROM LYING ON BACK TO SITTING ON SIDE OF FLAT BED WITH BEDRAILS: A LITTLE
DRESSING REGULAR UPPER BODY CLOTHING: A LITTLE
HELP NEEDED FOR BATHING: A LOT
CLIMB 3 TO 5 STEPS WITH RAILING: A LOT
DAILY ACTIVITIY SCORE: 15
DRESSING REGULAR LOWER BODY CLOTHING: A LOT
EATING MEALS: A LITTLE
WALKING IN HOSPITAL ROOM: A LITTLE
CLIMB 3 TO 5 STEPS WITH RAILING: A LOT
DAILY ACTIVITIY SCORE: 15
MOBILITY SCORE: 17
TOILETING: A LOT
MOVING FROM LYING ON BACK TO SITTING ON SIDE OF FLAT BED WITH BEDRAILS: A LITTLE
DRESSING REGULAR LOWER BODY CLOTHING: A LOT
MOVING FROM LYING ON BACK TO SITTING ON SIDE OF FLAT BED WITH BEDRAILS: A LITTLE
STANDING UP FROM CHAIR USING ARMS: A LITTLE
STANDING UP FROM CHAIR USING ARMS: A LITTLE
MOBILITY SCORE: 17
HELP NEEDED FOR BATHING: A LOT
CLIMB 3 TO 5 STEPS WITH RAILING: A LOT
PERSONAL GROOMING: A LITTLE
EATING MEALS: A LITTLE
DRESSING REGULAR UPPER BODY CLOTHING: A LITTLE
TURNING FROM BACK TO SIDE WHILE IN FLAT BAD: A LITTLE
MOBILITY SCORE: 17
MOVING TO AND FROM BED TO CHAIR: A LITTLE
TURNING FROM BACK TO SIDE WHILE IN FLAT BAD: A LITTLE
TURNING FROM BACK TO SIDE WHILE IN FLAT BAD: A LITTLE
MOVING TO AND FROM BED TO CHAIR: A LITTLE
WALKING IN HOSPITAL ROOM: A LITTLE
PERSONAL GROOMING: A LITTLE
MOVING TO AND FROM BED TO CHAIR: A LITTLE
WALKING IN HOSPITAL ROOM: A LITTLE

## 2024-06-27 ASSESSMENT — PAIN SCALES - GENERAL
PAINLEVEL_OUTOF10: 0 - NO PAIN

## 2024-06-27 ASSESSMENT — PAIN - FUNCTIONAL ASSESSMENT
PAIN_FUNCTIONAL_ASSESSMENT: 0-10
PAIN_FUNCTIONAL_ASSESSMENT: 0-10

## 2024-06-27 ASSESSMENT — ACTIVITIES OF DAILY LIVING (ADL)
ADL_ASSISTANCE: INDEPENDENT
ADLS_ADDRESSED: YES

## 2024-06-27 NOTE — PROGRESS NOTES
"Natalya Lira is a 89 y.o. female on day 1 of admission presenting with Acute hypoxic respiratory failure (Multi).    Subjective   Had IR-guided thoracentesis yesterday with drainage of 1.3L serosanguinous fluid. CXR this morning with decreased left-sided effusion, but did demonstrate an underlying mass. Patient states her breathing feels improved.       Objective   GEN: elderly woman in no respiratory distress. Sitting up in chair  CV: RRR, no m/g/r  LUNGS: moderate effort, better air movement on left    Last Recorded Vitals  Blood pressure 153/66, pulse 80, temperature 36 °C (96.8 °F), temperature source Temporal, resp. rate 18, height 1.499 m (4' 11.02\"), weight 58.2 kg (128 lb 3.2 oz), SpO2 94%.  Intake/Output last 3 Shifts:  I/O last 3 completed shifts:  In: 1620 (27.9 mL/kg) [P.O.:1320; IV Piggyback:300]  Out: 875 (15 mL/kg) [Urine:875 (0.4 mL/kg/hr)]  Dosing Weight: 58.2 kg     Relevant Results  Scheduled medications  aspirin, 81 mg, oral, Daily  atorvastatin, 10 mg, oral, Daily  azithromycin, 500 mg, intravenous, q24h  calcium carbonate-vitamin D3, 1 tablet, oral, BID  cefTRIAXone, 1 g, intravenous, q24h  clopidogrel, 75 mg, oral, Daily  cyanocobalamin, 1,000 mcg, oral, Daily  enoxaparin, 30 mg, subcutaneous, q24h  folic acid, 1 mg, oral, Daily  furosemide, 20 mg, oral, Daily  insulin glargine, 10 Units, subcutaneous, Nightly  insulin lispro, 0-10 Units, subcutaneous, TID  ipratropium-albuteroL, 3 mL, nebulization, TID  levothyroxine, 88 mcg, oral, Daily  losartan, 100 mg, oral, Daily  mirtazapine, 7.5 mg, oral, Nightly  pantoprazole, 20 mg, oral, Daily before breakfast  potassium chloride CR, 20 mEq, oral, Daily  sennosides-docusate sodium, 1 tablet, oral, BID      Continuous medications     PRN medications  PRN medications: dextrose, dextrose, glucagon, glucagon, ipratropium-albuteroL, oxygen    IMAGING:  CXR 6/27/2024: interval decrease in left-sided pleural effusion. Mass noted in left lung    CXR " 6/25/2024: large left pleural effusion    CT chest 9/9/2023:  Moderate left pleural effusion and associated atelectasis or   infiltrate. Small right pleural effusion/pleural thickening and   associated minimal right lung base atelectasis/scarring or   infiltrate. The remainder of the lung fields are clear bilaterally.   There is a ground-glass nodule along the minor fissure measuring   approximate 9 mm in greatest dimension.   This is stable since previous study from 05/28/2021                                Assessment/Plan   Principal Problem:    Acute hypoxic respiratory failure (Multi)  Active Problems:    Pleural effusion    Mass of left lung    Summary:  89 y.o. female admitted on 6/26/2024  3:39 AM presenting with shortness of breath found to have a large left pleural effusion.  Pleural effusion was first noticed in September 2023 and at that time was small.  There were also no apparent left-sided nodules or masses at that time.  She is feeling better after 1.3L thoracentesis on 6/26/2024, however, post-procedure chest x-ray demonstrating a left lung mass. Suspect this is a malignant pleural effusion. Pleural fluid studies consistent with exudative process.    Recommendations:  -Explained to patient she has a lung mass which will need further imaging. She is agreeable for CT chest with contrast (ordered)  -Follow up results of pleural fluid cytology       Taylor Davis DO  Pulmonary & Critical Care  6/27/2024 10:51 AM    Addendum 6/27/2024 1:17 PM  CT chest reviewed. Appears to have left upper lobe pneumonia. Effusion is likely parapneumonic in origin. Continue antibiotics.

## 2024-06-27 NOTE — NURSING NOTE
Skin rounds completed.  Pt up in chair at that time (0945).  Skin to bilateral heels and sacral area are clean, dry and intact.  Preventative mepilex applied to sacrum.    At approximately 0950, pt sat back down into chair.  Had been having an ongoing conversation with this nurse and assistant.  Pt suddenly unable to form a complete sentence, speech garbled and not making sense.  These symptoms lasted approximately 3 minutes.  Bedside nurse at bedside and neuro check done, pt now able to answer appropriately.  Dr. Mckeon and Dr. Escamilla notified of change in mental status and at bedside at 1000 for neuro assessment.  To discuss with Dr. Frank.

## 2024-06-27 NOTE — PROGRESS NOTES
Physical Therapy    Physical Therapy Evaluation & Treatment    Patient Name: Natalya Lira  MRN: [id number]  Today's Date: 6/27/2024   Time Calculation  Start Time: 1131  Stop Time: 1155  Time Calculation (min): 24 min    Assessment/Plan   PT Assessment  PT Assessment Results: Decreased strength, Decreased endurance, Decreased mobility  Rehab Prognosis: Good  Barriers to Discharge: None  Evaluation/Treatment Tolerance: Patient tolerated treatment well  Medical Staff Made Aware: Yes  Strengths: Ability to acquire knowledge, Access to adaptive/assistive products, Capable of completing ADLs semi/independent, Housing layout, Support of Caregivers  Barriers to Participation: Comorbidities  End of Session Communication: Bedside nurse  Assessment Comment: Pt tolerated PT eval well. Sensation deficits noted in L LE following L5, S1 dermatome and R LE S1 dermatome. Used restroom and managed own clothing. Requires max assistance to don pants from floor to knees . Ambulated with kyphotic posture, dec step length, naren, and demonstrated a step to pattern. Vitals remained stable following ambulation with HR 92 bpm, SpO2 94%. Recommend low intensity therapy.  End of Session Patient Position: Up in chair, Alarm off, not on at start of session (Not necessary per RN)   IP OR SWING BED PT PLAN  Inpatient or Swing Bed: Inpatient  PT Plan  Treatment/Interventions: Transfer training, Gait training, Stair training, Strengthening, Endurance training  PT Plan: Ongoing PT  PT Frequency: 2 times per week  PT Discharge Recommendations: Low intensity level of continued care  Equipment Recommended upon Discharge: Wheeled walker (Owns)  PT Recommended Transfer Status: Assist x1  PT - OK to Discharge: Yes (Per PT poc)      Subjective     General Visit Information:  General  Reason for Referral: Impaired mobility, acute hypoxic respiratory failure  Referred By: Ray Frank  Past Medical History Relevant to Rehab: T2DM, HTN, HLD, HFpEF 50% ef  7/2021, GERD, hypothyroidism, rheumatoid arthritis on methotrexate, CAD s/p PCI to RCA and LAD, hx aortic stenosis s/p TAVR, CKD3, hx mild cognitive impairment, macular degeneration of eyes, hx dvt  Family/Caregiver Present: No  Prior to Session Communication: Bedside nurse  Patient Position Received: Up in chair, Alarm off, not on at start of session  General Comment: Pt pleasant and cooperative with PT eval  Home Living:  Home Living  Type of Home: House  Lives With: Grandchildren (granddaughter)  Home Adaptive Equipment: Cane, Walker rolling or standard, Hospital bed, Reacher, Sock aid (Rollator)  Home Layout: One level  Home Access: Ramped entrance, Stairs to enter with rails  Entrance Stairs-Rails: Both  Entrance Stairs-Number of Steps: 5  Bathroom Shower/Tub: Walk-in shower  Bathroom Equipment: Shower chair with back  Prior Level of Function:  Prior Function Per Pt/Caregiver Report  Level of [place]: Independent with ADLs and functional transfers, Independent with homemaking with ambulation  Receives Help From: Family (granddaughter)  ADL Assistance: Independent  Homemaking Assistance: Needs assistance (Cooking, cleaning)  Ambulatory Assistance: Independent (Rollator)  Precautions:  Precautions  Medical Precautions: Fall precautions (Purewick, tele)  Vital Signs:  Vital Signs  Heart Rate: 92  SpO2: 94 %  Patient Position: Sitting (Following ambulation)    Objective   Pain:  Pain Assessment  Pain Assessment: 0-10  0-10 (Numeric) Pain Score: 0 - No pain  Cognition:  Cognition  Overall Cognitive Status: Within Functional Limits  Orientation Level: Oriented X4    General Assessments:     Activity Tolerance  Endurance: Tolerates 10 - 20 min exercise with multiple rests    Sensation  Light Touch: Partial deficits in the RLE, Partial deficits in the LLE (L LE deficit L5-S1 dermatome, R LE deficit S1 dermatome)    Strength  Strength Comments: KEMAR LE 3+/5  Strength  Strength Comments: KEMAR PHAM  3+/5    Coordination  Movements are Fluid and Coordinated: Yes  Heel to Shin: Intact    Postural Control  Postural Control: Within Functional Limits  Posture Comment: kyphotic    Static Sitting Balance  Static Sitting-Balance Support: No upper extremity supported, Feet supported  Static Sitting-Level of Assistance: Close supervision    Static Standing Balance  Static Standing-Balance Support: Bilateral upper extremity supported  Static Standing-Level of Assistance: Contact guard  Functional Assessments:    Bed Mobility  Bed Mobility: No    Transfers  Transfer: Yes  Transfer 1  Transfer From 1: Sit to, Stand to  Transfer to 1: Sit, Stand  Technique 1: Sit to stand, Stand to sit  Transfer Device 1: Walker  Transfer Level of Assistance 1: Contact guard  Trials/Comments 1: x2    Ambulation/Gait Training  Ambulation/Gait Training Performed: Yes  Ambulation/Gait Training 1  Surface 1: Level tile  Device 1: Rolling walker  Assistance 1: Contact guard  Quality of Gait 1: Narrow base of support, Decreased step length, Forward flexed posture (Step to gait pattern)  Comments/Distance (ft) 1: 25' x2    Stairs  Stairs: No    Treatments:   ADL  ADL's Addressed: Yes  ADL Comments: Pt independent for doffing underpants and pants, max assist for changing undergarment due to incontinence in sitting. Max assist for pants from floor to knees, min assist to complete in standing. Independent for personal hygiene and hand hygiene.    Ambulation/Gait Training  Ambulation/Gait Training Performed: Yes  Ambulation/Gait Training 1  Surface 1: Level tile  Device 1: Rolling walker  Assistance 1: Contact guard  Quality of Gait 1: Narrow base of support, Decreased step length, Forward flexed posture (Step to gait pattern)  Comments/Distance (ft) 1: 25' x2  Transfers  Transfer: Yes  Transfer 1  Transfer From 1: Sit to, Stand to  Transfer to 1: Sit, Stand  Technique 1: Sit to stand, Stand to sit  Transfer Device 1: Walker  Transfer Level of  Assistance 1: Contact guard  Trials/Comments 1: x2  Outcome Measures:  [facility] Basic Mobility  Turning from your back to your side while in a flat bed without using bedrails: A little  Moving from lying on your back to sitting on the side of a flat bed without using bedrails: A little  Moving to and from bed to chair (including a wheelchair): A little  Standing up from a chair using your arms (e.g. wheelchair or bedside chair): A little  To walk in hospital room: A little  Climbing 3-5 steps with railing: A lot  Basic Mobility - Total Score: 17    Encounter Problems       Encounter Problems (Active)       Balance       STG - Maintains dynamic standing balance without upper extremity support for 3 minutes independently   (Progressing)       Start:  06/27/24    Expected End:  07/11/24            STG - Maintains dynamic sitting balance with dual task without upper extremity support for 5 minutes independently  (Progressing)       Start:  06/27/24    Expected End:  07/11/24               Mobility       STG - Patient will ambulate 50' with rollator MOD I  (Progressing)       Start:  06/27/24    Expected End:  07/11/24            STG - Patient will ascend and descend five stairs with us of B rails with supervision  (Progressing)       Start:  06/27/24    Expected End:  07/11/24               PT Transfers       STG - Patient will perform sit to stand x4 in 30 seconds without use of UE  (Progressing)       Start:  06/27/24    Expected End:  07/11/24               Pain - Adult              Education Documentation  Precautions, taught by KATHERIN Balbuena at 6/27/2024  1:15 PM.  Learner: Patient  Readiness: Eager  Method: Explanation, Demonstration  Response: Verbalizes Understanding    Body Mechanics, taught by KATHERIN Balbuena at 6/27/2024  1:15 PM.  Learner: Patient  Readiness: Eager  Method: Explanation, Demonstration  Response: Verbalizes Understanding    Mobility Training, taught by KATHERIN Balbuena at  6/27/2024  1:15 PM.  Learner: Patient  Readiness: Eager  Method: Explanation, Demonstration  Response: Verbalizes Understanding    Education Comments  No comments found.

## 2024-06-27 NOTE — CARE PLAN
The patient's goals for the shift include      The clinical goals for the shift include To remain safe throughout the shift.    Over the shift, the patient did not make progress toward the following goals. Barriers to progression include . Recommendations to address these barriers include .      Problem: Pain - Adult  Goal: Verbalizes/displays adequate comfort level or baseline comfort level  6/26/2024 2201 by Patricia Betts RN  Outcome: Progressing  6/26/2024 2200 by Patricia Betts RN  Outcome: Progressing     Problem: Pain  Goal: Turns in bed with improved pain control throughout the shift  6/26/2024 2201 by Patricia Betts RN  Outcome: Progressing  6/26/2024 2200 by Patricia Betts RN  Outcome: Progressing     Problem: Skin  Goal: Participates in plan/prevention/treatment measures  6/26/2024 2201 by Patricia Betts RN  Outcome: Progressing  6/26/2024 2201 by Patricia Betts RN  Flowsheets (Taken 6/26/2024 2201)  Participates in plan/prevention/treatment measures: Elevate heels  6/26/2024 2200 by Patricia Betts RN  Outcome: Progressing

## 2024-06-27 NOTE — PROGRESS NOTES
Internal Medicine - Daily Progress Note   Hospital Day: 2       Name:Natalya Lira, AGE: 89 y.o., GENDER: female, MRN: [id number], ROOM: 252/252-A   CODE STATUS: DNR and No Intubation  Attending Physician: Ray Frank DO  Resident: Shira Escamilla MD        Chief Complaint   No chief complaint on file.       Subjective    Natalya Lira is a 89 y.o. year old female patient on Hospital Day: 2 presenting with Acute hypoxic respiratory failure (Multi).    Overnight events:  - No acute events overnight.  Patient seen and examined at bedside today.  In the morning the bedside nurse did have concerns regarding patient possibly having a brief episode of dysarthria.  When examined, patient was at baseline.  No neurologic deficit were appreciated otherwise.    Meds    Scheduled medications  aspirin, 81 mg, oral, Daily  atorvastatin, 10 mg, oral, Daily  azithromycin, 500 mg, intravenous, q24h  calcium carbonate-vitamin D3, 1 tablet, oral, BID  cefTRIAXone, 1 g, intravenous, q24h  clopidogrel, 75 mg, oral, Daily  cyanocobalamin, 1,000 mcg, oral, Daily  enoxaparin, 30 mg, subcutaneous, q24h  folic acid, 1 mg, oral, Daily  furosemide, 20 mg, oral, Daily  insulin glargine, 10 Units, subcutaneous, Nightly  insulin lispro, 0-10 Units, subcutaneous, TID  ipratropium-albuteroL, 3 mL, nebulization, TID  levothyroxine, 88 mcg, oral, Daily  losartan, 100 mg, oral, Daily  mirtazapine, 7.5 mg, oral, Nightly  pantoprazole, 20 mg, oral, Daily before breakfast  potassium chloride CR, 20 mEq, oral, Daily  sennosides-docusate sodium, 1 tablet, oral, BID      Continuous medications     PRN medications  PRN medications: dextrose, dextrose, glucagon, glucagon, ipratropium-albuteroL, oxygen     Objective    Physical Exam  Constitutional:       Appearance: Normal appearance.   HENT:      Head: Normocephalic and atraumatic.      Mouth/Throat:      Mouth: Mucous membranes are moist.   Cardiovascular:      Rate and Rhythm: Normal rate and regular  "rhythm.      Heart sounds: No murmur heard.     No friction rub. No gallop.   Pulmonary:      Comments: Bilaterally reduced lung sounds  Abdominal:      General: Abdomen is flat. Bowel sounds are normal.      Palpations: Abdomen is soft. There is no mass.      Tenderness: There is no abdominal tenderness. There is no guarding.   Musculoskeletal:         General: No tenderness. Normal range of motion.      Cervical back: Normal range of motion.      Right lower leg: Edema present.      Left lower leg: Edema present.   Neurological:      Mental Status: She is alert and oriented to person, place, and time.      Comments: But confused        Visit Vitals  /66 (BP Location: Left arm, Patient Position: Sitting)   Pulse 80   Temp 36 °C (96.8 °F) (Temporal)   Resp 18   Ht 1.499 m (4' 11.02\")   Wt 58.2 kg (128 lb 3.2 oz)   SpO2 94%   BMI 25.88 kg/m²   Smoking Status Former   BSA 1.56 m²        Intake/Output Summary (Last 24 hours) at 6/27/2024 1129  Last data filed at 6/27/2024 0940  Gross per 24 hour   Intake 1140 ml   Output 675 ml   Net 465 ml       Labs:   Results from last 72 hours   Lab Units 06/27/24  0531 06/26/24  0600 06/25/24  1655   SODIUM mmol/L 139 140 139   POTASSIUM mmol/L 3.6 3.6 3.4*   CHLORIDE mmol/L 100 103 102   CO2 mmol/L 30 26 26   BUN mg/dL 17 16 19   CREATININE mg/dL 1.19* 1.03 1.09*   GLUCOSE mg/dL 148* 209* 321*   CALCIUM mg/dL 8.7 8.7 9.5   ANION GAP mmol/L 13 15 14   EGFR mL/min/1.73m*2 44* 52* 49*   PHOSPHORUS mg/dL 3.4 2.9  --       Results from last 72 hours   Lab Units 06/27/24  0531 06/26/24  0600 06/25/24  1655   WBC AUTO x10*3/uL 8.3 8.8 8.9   HEMOGLOBIN g/dL 13.5 14.0 14.5   HEMATOCRIT % 41.3 42.6 43.3   PLATELETS AUTO x10*3/uL 139* 157 170   NEUTROS PCT AUTO %  --  80.6 83.2   LYMPHS PCT AUTO %  --  12.9 12.0   MONOS PCT AUTO %  --  5.0 3.8   EOS PCT AUTO %  --  0.8 0.2      Lab Results   Component Value Date    CALCIUM 8.7 06/27/2024    PHOS 3.4 06/27/2024      Lab Results " "  Component Value Date    CRP 1.59 (H) 06/26/2024       [unfilled]     Micro/ID:   Susceptibility data from last 90 days.  Collected Specimen Info Organism   06/25/24 Urine from Clean Catch/Voided Gram Negative Bacilli                    No lab exists for component: \"AGALPCRNB\"     Lab Results   Component Value Date    URINECULTURE 20,000 - 80,000 Gram Negative Bacilli (A) 06/25/2024    BLOODCULT Loaded on Instrument - Culture in progress 06/26/2024    BLOODCULT Loaded on Instrument - Culture in progress 06/26/2024       Images    XR chest 1 view    Result Date: 6/27/2024  Interpreted By:  Ning Hansen, STUDY: XR CHEST 1 VIEW; ;  6/27/2024 8:30 am   INDICATION: Signs/Symptoms:s/p thoracentesis.   COMPARISON: Chest radiograph dated 06/25/2024   ACCESSION NUMBER(S): [id number]   ORDERING CLINICIAN: BRENDA CHARLES   FINDINGS: Cardiac silhouette is stable in size and morphology. There is interval decrease in left-sided pleural effusion with moderate residual compared to prior radiographs status post thoracentesis. There is a rounded airspace opacity in the left lung mid zone this could again be related to layering pleural effusion. There is persistent evidence of prominence of interstitial markings in bilateral lungs. Right lung is relatively clear. No large pneumothorax within limits of portable technique. Aortic root stent graft projects over the mediastinum and is similar compared to prior radiograph. Moderate atherosclerotic calcifications of the aortic knob are noted. Again noted is age-indeterminate fracture through proximal left humerus, similar compared to prior radiograph. Otherwise no acute osseous findings.       1. Interval decrease in left sided pleural effusion status post thoracentesis with mild residual. Ill-defined rounded airspace opacity in the left mid lung zone could be related to layering pleural effusion, however underlying mass lesion versus pneumonia can also be considered in the " differential. Recommend short interval follow-up radiograph versus a CT chest in 3-6 weeks to document resolution. 2. Age-indeterminate transverse fracture through left humeral neck, similar compared to prior imaging.       MACRO: Critical Finding:  See findings. Notification was initiated on 6/27/2024 at 10:16 am by Dr. Ning Hansen.  (**-YCF-**) Instructions:   Signed by: Ning Hansen 6/27/2024 10:16 AM Dictation workstation:   [id number]    US thoracentesis    Result Date: 6/26/2024  Interpreted By:  Albin Urban, STUDY: US THORACENTESIS; 6/26/2024 3:40 pm   INDICATION: Signs/Symptoms:left thoracentesis.   COMPARISON: Chest x-ray from 06/25/2024   ACCESSION NUMBER(S): [id number]   ORDERING CLINICIAN: BRENDA CHARLES   TECHNIQUE: CONSENT: The patient was informed of the nature of the proposed procedure. The purposes, alternatives, risks, and benefits were explained and discussed. All questions were answered and consent was obtained.   SEDATION: None   MEDICATION/CONTRAST: No additional   TIME OUT: A time out was performed immediately prior to procedure start with the interventional team, correctly identifying the patient name, date of birth, MRN, procedure, anatomy (including marking of site and side), patient position, procedure consent form, relevant laboratory and imaging test results, antibiotic administration, safety precautions, and procedure-specific equipment needs.   FINDINGS: The patient was placed in the sitting position.   The pleural space was examined with grey scale ultrasound, and the most accessible fluid identified and marked for thoracentesis.   The skin was prepped and draped in usual manner. Local anesthesia with Lidocaine was administered and a  left-sided thoracentesis was performed. A [employer] needle over catheter was then placed where marked. Approximately 1300 mL of serosanguineous colored fluid was removed. The needle/catheter was then withdrawn.   The patient tolerated the  procedure well and there were no immediate complications. Specimen sent to the laboratory for further evaluation, per the requesting team.       Uneventful  left-sided thoracentesis, as detailed above .   Signed by: Albin Urban 6/26/2024 3:53 PM Dictation workstation:   [id number]    XR chest 1 view    Result Date: 6/25/2024  Interpreted By:  Beny Root, STUDY: XR CHEST 1 VIEW;  6/25/2024 4:54 pm   INDICATION: Signs/Symptoms:sob.   COMPARISON: 07/03/2023   ACCESSION NUMBER(S): [id number]   ORDERING CLINICIAN: MAKAYLA BANKS   FINDINGS: There is a large left pleural effusion. There is dense left basilar airspace disease. The right lung is clear. The lungs are hypoinflated. There is enlargement of the cardiac silhouette with pulmonary edema       1.  Large left pleural effusion with dense left basilar airspace disease. Underlying pneumonia is in the differential. 2. Hyperinflated lungs.       MACRO: None   Signed by: Beny Root 6/25/2024 5:09 PM Dictation workstation:   [id number]      Assessment and Plan    Natalya Lira is a 89 y.o. female admitted on 6/26/2024 who presents due to shortness of breath, found with acute hypoxic respiratory failure due to pleural effusion and suspected pneumonia. Has PMHx significant for T2DM, HTN, HLD, HFpEF 50% ef 7/2021, GERD, hypothyroidism, rheumatoid arthritis on methotrexate, CAD s/p PCI to RCA and LAD, hx aortic stenosis s/p TAVR 2021, hx DVT was on Eliquis, CKD3, hx mild cognitive impairment, macular degeneration of eyes, hx dvt (previously on eliquis).  Pulmonology on board.  Currently patient status post thoracentesis on 06/26/2024 with 1300 cc fluid removal    ACUTE MEDICAL ISSUES:  # Acute hypoxic respiratory failure - Resolved  # Large Left pleural effusion - Improved after thoracentesis   # Suspected community acquired pneumonia   - Sx: Nonresolving shortness of breath  - Labs: No leukocytosis, elevated BNP at 776, elevated CRP at 1.59.  Troponins 79 -->  73. Procal pending.  Strep pneumo, Legionella pending.  Urine culture drawn on 06/25 pending, blood cultures drawn on 06/26 pending  - Imaging:  CXR Large left pleural effusion with dense left basilar airspace disease. Underlying pneumonia is in the differential.   - Echo from 08/02/2021: LVEF 50%, mildly dilated LA, moderate LVH, normal appearance TAVR, mild periprosthetic aortic regurgitstion, moderate MR, mild to moderate TR, moderate pulm HTN  - Abx: Ceftriaxone 1g Q24Hr  (6/26 - 06/30) and Azithro 500 mg every day  (6/26 - 06/28)   - Tx: Lasix 20 mg every day.  On potassium 20 mEq supplement for diuretic induced hypokalemia  [ ] Echo ordered; Pending  - Pulm consulted: Patient to get IR guided thoracentesis today on 06/26/2024. CXR afterwards showed Interval decrease in left sided pleural effusion status post thoracentesis with mild residual  Ill-defined rounded airspace opacity in the left mid lung zone could be related to layering pleural effusion, however underlying mass lesion versus pneumonia can also be considered in the differential.   - Resp Regimen: Duoneb, [place] + IS  - Supportive: I/O, 1500 ml fluid restriction, 2 g Na restriction, supplemental oxygen to maintain O2 sats > 90%    # Asymptomatic UTI:  - UC from 06/25/2024: 20,000 - 80,000 Gram Negative bacilli  - Abx: Currently being covered with  Ceftriaxone 1g Q24Hr  (6/26 - 06/30)     # Ill-defined rounded airspace opacity  - Seen in CXR done 06/27/24 s/p thoracentesis  - Pulm on board: Patient getting CT chest w/ contrast    CHRONIC MEDICAL ISSUES:  # CAD s/p stent - Continue home aspirin 81 mg every day and Plavix 75 mg every day        # hx DVT - more than 1 year ago, was on Eliquis and was advised to stop due to multiple falls.   # hx aortic stenosis s/p TAVR - Evolut Pro+ 26mm 6/29/21 non mechanical.   # T2DM - hold home Glimepiride 4 mg twice daily. SSI #2 (0 to 10 units 3 times daily)  # HTN - On home losartan 100 mg every day              # hx rheumatoid arthritis - Hold methotrexate, continue folate 1 mg every day  # MDD / insomnia - On mirtazapine 7.5 nightly  # HLD - On atorvastatin 10 mg every day        # GERD - On protonix 20 mg every day           # Hypothyroidism - On levothyroxine 88 mcg every day          # Hypovitaminosis: On Vit B12 1000 mcg every day, folate 1 mg every day  # Constipation: On Roberta-Colace 8.6 - 50 mg 1 tab nightly    Fluids: None, 1500 ml fluid restriction  Electrolytes: Replete as needed    Nutrition: Cardiac + Diabetic   Antimicrobials: Ceftriaxone 1g Q24Hr  (6/26 - 06/30) and Azithro 500 mg every day      (6/26 - 06/28)   DVT ppx:  SCD; Lovenox 30 mg subcutaneous every day   GI ppx: protonix 20 mg every day   Catheter: External female catheter  Lines: 20 G PIV in rt. antecubital  Supplemental Oxygen: On RA  Emergency Contact: Extended Emergency Contact Information  Primary Emergency Contact: Phyllis Lira  Home Phone: [phone]  Relation: Grandchild  Secondary Emergency Contact: Migel Lira  Address: [address] [place] [address]  Home Phone: [phone]  Relation: None   Code: DNR and No Intubation     Disposition: Currently rpeat CT. Has no home going needs. PT/OT recommends low intensity. Will be discharged home once primary issue resolved     Shira Escamilla MD  Internal Medicine, PGY- 1  06/27/24 at 11:29 AM     Disclaimer: Documentation completed with the information available at the time of input. The times in the chart may not be reflective of actual patient care times, interventions, or procedures. Documentation occurs after the physical care of the patient. This note was dictated by speech recognition. Minor errors in transcription may be present

## 2024-06-27 NOTE — PROGRESS NOTES
06/27/24 9931   Discharge Planning   Patient expects to be discharged to: Spoke to daughter who selected [facility], PT, OT) at discharge as that is who they have used before, referral sent in [employer] and awaiting acceptance.        06/27/24 9997   Patient Choice   Provider Choice list and CMS website (https://medicare.gov/care-compare#search) for post-acute Quality and Resource Measure Data were provided and reviewed with: Family   Patient / Family choosing to utilize agency / facility established prior to hospitalization No

## 2024-06-28 ENCOUNTER — APPOINTMENT (OUTPATIENT)
Dept: CARDIOLOGY | Facility: HOSPITAL | Age: 89
End: 2024-06-28
Payer: MEDICARE

## 2024-06-28 ENCOUNTER — APPOINTMENT (OUTPATIENT)
Dept: RADIOLOGY | Facility: HOSPITAL | Age: 89
End: 2024-06-28
Payer: MEDICARE

## 2024-06-28 ENCOUNTER — PHARMACY VISIT (OUTPATIENT)
Dept: PHARMACY | Facility: CLINIC | Age: 89
End: 2024-06-28
Payer: COMMERCIAL

## 2024-06-28 PROBLEM — J95.811 POSTPROCEDURAL PNEUMOTHORAX: Status: ACTIVE | Noted: 2024-06-28

## 2024-06-28 PROBLEM — J96.01 ACUTE HYPOXIC RESPIRATORY FAILURE (MULTI): Status: RESOLVED | Noted: 2024-06-26 | Resolved: 2024-06-28

## 2024-06-28 PROBLEM — J90 PLEURAL EFFUSION: Status: RESOLVED | Noted: 2024-06-27 | Resolved: 2024-06-28

## 2024-06-28 LAB
ALBUMIN SERPL BCP-MCNC: 3.1 G/DL (ref 3.4–5)
ANION GAP SERPL CALC-SCNC: 12 MMOL/L (ref 10–20)
AORTIC VALVE MEAN GRADIENT: 3 MMHG
AORTIC VALVE PEAK VELOCITY: 1.19 M/S
AV PEAK GRADIENT: 5.7 MMHG
AVA (PEAK VEL): 2.3 CM2
AVA (VTI): 2.05 CM2
BACTERIA UR CULT: ABNORMAL
BUN SERPL-MCNC: 17 MG/DL (ref 6–23)
CALCIUM SERPL-MCNC: 8.8 MG/DL (ref 8.6–10.3)
CHLORIDE SERPL-SCNC: 100 MMOL/L (ref 98–107)
CO2 SERPL-SCNC: 28 MMOL/L (ref 21–32)
CREAT SERPL-MCNC: 1.17 MG/DL (ref 0.5–1.05)
EGFRCR SERPLBLD CKD-EPI 2021: 45 ML/MIN/1.73M*2
EJECTION FRACTION APICAL 4 CHAMBER: 32.8
EJECTION FRACTION: 50 %
ERYTHROCYTE [DISTWIDTH] IN BLOOD BY AUTOMATED COUNT: 15.5 % (ref 11.5–14.5)
GLUCOSE BLD MANUAL STRIP-MCNC: 170 MG/DL (ref 74–99)
GLUCOSE BLD MANUAL STRIP-MCNC: 175 MG/DL (ref 74–99)
GLUCOSE BLD MANUAL STRIP-MCNC: 192 MG/DL (ref 74–99)
GLUCOSE BLD MANUAL STRIP-MCNC: 298 MG/DL (ref 74–99)
GLUCOSE SERPL-MCNC: 137 MG/DL (ref 74–99)
HCT VFR BLD AUTO: 42.6 % (ref 36–46)
HGB BLD-MCNC: 13.9 G/DL (ref 12–16)
LEFT ATRIUM VOLUME AREA LENGTH INDEX BSA: 29.7 ML/M2
LEFT VENTRICLE INTERNAL DIMENSION DIASTOLE: 3.76 CM (ref 3.5–6)
LEFT VENTRICULAR OUTFLOW TRACT DIAMETER: 2 CM
MAGNESIUM SERPL-MCNC: 2 MG/DL (ref 1.6–2.4)
MCH RBC QN AUTO: 33.7 PG (ref 26–34)
MCHC RBC AUTO-ENTMCNC: 32.6 G/DL (ref 32–36)
MCV RBC AUTO: 103 FL (ref 80–100)
MITRAL VALVE E/A RATIO: 0.44
NRBC BLD-RTO: 0 /100 WBCS (ref 0–0)
PHOSPHATE SERPL-MCNC: 4.3 MG/DL (ref 2.5–4.9)
PLATELET # BLD AUTO: 138 X10*3/UL (ref 150–450)
POTASSIUM SERPL-SCNC: 4.2 MMOL/L (ref 3.5–5.3)
RBC # BLD AUTO: 4.12 X10*6/UL (ref 4–5.2)
RIGHT VENTRICLE FREE WALL PEAK S': 8.43 CM/S
SODIUM SERPL-SCNC: 136 MMOL/L (ref 136–145)
TRICUSPID ANNULAR PLANE SYSTOLIC EXCURSION: 2.2 CM
WBC # BLD AUTO: 9.5 X10*3/UL (ref 4.4–11.3)

## 2024-06-28 PROCEDURE — 2500000005 HC RX 250 GENERAL PHARMACY W/O HCPCS

## 2024-06-28 PROCEDURE — 83735 ASSAY OF MAGNESIUM: CPT

## 2024-06-28 PROCEDURE — 71045 X-RAY EXAM CHEST 1 VIEW: CPT | Mod: 76

## 2024-06-28 PROCEDURE — 1200000002 HC GENERAL ROOM WITH TELEMETRY DAILY

## 2024-06-28 PROCEDURE — 99233 SBSQ HOSP IP/OBS HIGH 50: CPT | Performed by: INTERNAL MEDICINE

## 2024-06-28 PROCEDURE — 71045 X-RAY EXAM CHEST 1 VIEW: CPT

## 2024-06-28 PROCEDURE — 2500000005 HC RX 250 GENERAL PHARMACY W/O HCPCS: Performed by: INTERNAL MEDICINE

## 2024-06-28 PROCEDURE — 36415 COLL VENOUS BLD VENIPUNCTURE: CPT

## 2024-06-28 PROCEDURE — 94668 MNPJ CHEST WALL SBSQ: CPT

## 2024-06-28 PROCEDURE — 93306 TTE W/DOPPLER COMPLETE: CPT

## 2024-06-28 PROCEDURE — 2500000002 HC RX 250 W HCPCS SELF ADMINISTERED DRUGS (ALT 637 FOR MEDICARE OP, ALT 636 FOR OP/ED)

## 2024-06-28 PROCEDURE — 2500000001 HC RX 250 WO HCPCS SELF ADMINISTERED DRUGS (ALT 637 FOR MEDICARE OP)

## 2024-06-28 PROCEDURE — 94760 N-INVAS EAR/PLS OXIMETRY 1: CPT

## 2024-06-28 PROCEDURE — 0W9B3ZZ DRAINAGE OF LEFT PLEURAL CAVITY, PERCUTANEOUS APPROACH: ICD-10-PCS | Performed by: INTERNAL MEDICINE

## 2024-06-28 PROCEDURE — 99232 SBSQ HOSP IP/OBS MODERATE 35: CPT

## 2024-06-28 PROCEDURE — 85027 COMPLETE CBC AUTOMATED: CPT

## 2024-06-28 PROCEDURE — RXMED WILLOW AMBULATORY MEDICATION CHARGE

## 2024-06-28 PROCEDURE — 82947 ASSAY GLUCOSE BLOOD QUANT: CPT | Mod: 91

## 2024-06-28 PROCEDURE — 2500000002 HC RX 250 W HCPCS SELF ADMINISTERED DRUGS (ALT 637 FOR MEDICARE OP, ALT 636 FOR OP/ED): Performed by: INTERNAL MEDICINE

## 2024-06-28 PROCEDURE — 0W9B30Z DRAINAGE OF LEFT PLEURAL CAVITY WITH DRAINAGE DEVICE, PERCUTANEOUS APPROACH: ICD-10-PCS | Performed by: INTERNAL MEDICINE

## 2024-06-28 PROCEDURE — 94761 N-INVAS EAR/PLS OXIMETRY MLT: CPT

## 2024-06-28 PROCEDURE — 80069 RENAL FUNCTION PANEL: CPT

## 2024-06-28 PROCEDURE — 32551 INSERTION OF CHEST TUBE: CPT | Performed by: INTERNAL MEDICINE

## 2024-06-28 PROCEDURE — 2500000001 HC RX 250 WO HCPCS SELF ADMINISTERED DRUGS (ALT 637 FOR MEDICARE OP): Performed by: NURSE PRACTITIONER

## 2024-06-28 PROCEDURE — 71045 X-RAY EXAM CHEST 1 VIEW: CPT | Mod: REPEAT PROCEDURE BY SAME PHYSICIAN | Performed by: RADIOLOGY

## 2024-06-28 PROCEDURE — 32555 ASPIRATE PLEURA W/ IMAGING: CPT | Performed by: INTERNAL MEDICINE

## 2024-06-28 PROCEDURE — 2500000004 HC RX 250 GENERAL PHARMACY W/ HCPCS (ALT 636 FOR OP/ED)

## 2024-06-28 PROCEDURE — 94640 AIRWAY INHALATION TREATMENT: CPT

## 2024-06-28 RX ORDER — FUROSEMIDE 20 MG/1
20 TABLET ORAL DAILY
Qty: 30 TABLET | Refills: 0 | Status: SHIPPED | OUTPATIENT
Start: 2024-06-28

## 2024-06-28 RX ORDER — OXYCODONE HYDROCHLORIDE 5 MG/1
2.5 TABLET ORAL EVERY 6 HOURS PRN
Status: ACTIVE | OUTPATIENT
Start: 2024-06-28

## 2024-06-28 RX ORDER — ACETAMINOPHEN 325 MG/1
975 TABLET ORAL EVERY 8 HOURS PRN
Status: DISPENSED | OUTPATIENT
Start: 2024-06-28

## 2024-06-28 RX ORDER — LEVOTHYROXINE SODIUM 88 UG/1
88 TABLET ORAL DAILY
Qty: 30 TABLET | Refills: 0 | Status: SHIPPED | OUTPATIENT
Start: 2024-06-29

## 2024-06-28 RX ORDER — PANTOPRAZOLE SODIUM 20 MG/1
20 TABLET, DELAYED RELEASE ORAL
Qty: 30 TABLET | Refills: 0 | Status: SHIPPED | OUTPATIENT
Start: 2024-06-29

## 2024-06-28 RX ORDER — POTASSIUM CHLORIDE 20 MEQ/1
20 TABLET, EXTENDED RELEASE ORAL DAILY
Qty: 30 TABLET | Refills: 0 | Status: SHIPPED | OUTPATIENT
Start: 2024-06-28

## 2024-06-28 RX ORDER — OXYCODONE HYDROCHLORIDE 5 MG/1
5 TABLET ORAL EVERY 6 HOURS PRN
Status: DISPENSED | OUTPATIENT
Start: 2024-06-28

## 2024-06-28 RX ORDER — AMOXICILLIN AND CLAVULANATE POTASSIUM 500; 125 MG/1; MG/1
1 TABLET, FILM COATED ORAL 2 TIMES DAILY
Qty: 13 TABLET | Refills: 0 | Status: SHIPPED | OUTPATIENT
Start: 2024-06-28 | End: 2024-07-05

## 2024-06-28 RX ORDER — ATORVASTATIN CALCIUM 10 MG/1
10 TABLET, FILM COATED ORAL DAILY
Qty: 10 TABLET | Refills: 0 | Status: SHIPPED | OUTPATIENT
Start: 2024-06-28

## 2024-06-28 RX ORDER — AMOXICILLIN AND CLAVULANATE POTASSIUM 875; 125 MG/1; MG/1
1 TABLET, FILM COATED ORAL 2 TIMES DAILY
Qty: 13 TABLET | Refills: 0 | Status: SHIPPED | OUTPATIENT
Start: 2024-06-28 | End: 2024-06-28 | Stop reason: HOSPADM

## 2024-06-28 RX ORDER — ONDANSETRON 4 MG/1
4 TABLET, ORALLY DISINTEGRATING ORAL EVERY 8 HOURS PRN
Status: DISPENSED | OUTPATIENT
Start: 2024-06-28

## 2024-06-28 RX ORDER — CLOPIDOGREL BISULFATE 75 MG/1
75 TABLET ORAL DAILY
Qty: 30 TABLET | Refills: 0 | Status: SHIPPED | OUTPATIENT
Start: 2024-06-28

## 2024-06-28 RX ORDER — FERROUS SULFATE, DRIED 160(50) MG
1 TABLET, EXTENDED RELEASE ORAL 2 TIMES DAILY
Qty: 30 TABLET | Refills: 0 | Status: SHIPPED | OUTPATIENT
Start: 2024-06-28

## 2024-06-28 RX ORDER — ONDANSETRON HYDROCHLORIDE 2 MG/ML
4 INJECTION, SOLUTION INTRAVENOUS EVERY 8 HOURS PRN
Status: ACTIVE | OUTPATIENT
Start: 2024-06-28

## 2024-06-28 ASSESSMENT — PAIN - FUNCTIONAL ASSESSMENT
PAIN_FUNCTIONAL_ASSESSMENT: 0-10

## 2024-06-28 ASSESSMENT — PAIN SCALES - GENERAL
PAINLEVEL_OUTOF10: 4
PAINLEVEL_OUTOF10: 0 - NO PAIN
PAINLEVEL_OUTOF10: 7
PAINLEVEL_OUTOF10: 7
PAINLEVEL_OUTOF10: 6
PAINLEVEL_OUTOF10: 7

## 2024-06-28 ASSESSMENT — COGNITIVE AND FUNCTIONAL STATUS - GENERAL
TOILETING: A LOT
TURNING FROM BACK TO SIDE WHILE IN FLAT BAD: A LITTLE
MOVING TO AND FROM BED TO CHAIR: A LITTLE
STANDING UP FROM CHAIR USING ARMS: A LITTLE
DRESSING REGULAR UPPER BODY CLOTHING: A LITTLE
MOVING FROM LYING ON BACK TO SITTING ON SIDE OF FLAT BED WITH BEDRAILS: A LITTLE
DAILY ACTIVITIY SCORE: 15
CLIMB 3 TO 5 STEPS WITH RAILING: A LOT
DRESSING REGULAR LOWER BODY CLOTHING: A LOT
WALKING IN HOSPITAL ROOM: A LITTLE
HELP NEEDED FOR BATHING: A LOT
PERSONAL GROOMING: A LITTLE
EATING MEALS: A LITTLE
MOBILITY SCORE: 17

## 2024-06-28 ASSESSMENT — PAIN DESCRIPTION - ORIENTATION: ORIENTATION: LEFT

## 2024-06-28 ASSESSMENT — PAIN DESCRIPTION - LOCATION: LOCATION: CHEST

## 2024-06-28 NOTE — PROGRESS NOTES
"Natalya Lira is a 89 y.o. female on day 2 of admission presenting with Acute hypoxic respiratory failure (Multi).    Subjective   CT chest from yesterday demonstrated a LILLY pneumonia and persistent left pleural effusion. Patient states her breathing feels better. Denies cough or dyspnea.       Objective   GEN: appears well. Sitting up in chair. No respiratory difficulties  CV: RRR, no m/g/r  LUNGS: good effort, diminished on left    Bedside ultrasound: moderate-large pleural effusion on left persists.      Physical Exam    Last Recorded Vitals  Blood pressure 126/64, pulse 71, temperature 36 °C (96.8 °F), temperature source Temporal, resp. rate 18, height 1.499 m (4' 11.02\"), weight 55.9 kg (123 lb 3.8 oz), SpO2 96%.  Intake/Output last 3 Shifts:  I/O last 3 completed shifts:  In: 1390 (23.9 mL/kg) [P.O.:840; IV Piggyback:550]  Out: 275 (4.7 mL/kg) [Urine:275 (0.1 mL/kg/hr)]  Dosing Weight: 58.2 kg     Relevant Results  Results for orders placed or performed during the hospital encounter of 06/26/24 (from the past 24 hour(s))   POCT GLUCOSE   Result Value Ref Range    POCT Glucose 274 (H) 74 - 99 mg/dL   POCT GLUCOSE   Result Value Ref Range    POCT Glucose 86 74 - 99 mg/dL   POCT GLUCOSE   Result Value Ref Range    POCT Glucose 210 (H) 74 - 99 mg/dL   POCT GLUCOSE   Result Value Ref Range    POCT Glucose 175 (H) 74 - 99 mg/dL   Renal function panel   Result Value Ref Range    Glucose 137 (H) 74 - 99 mg/dL    Sodium 136 136 - 145 mmol/L    Potassium 4.2 3.5 - 5.3 mmol/L    Chloride 100 98 - 107 mmol/L    Bicarbonate 28 21 - 32 mmol/L    Anion Gap 12 10 - 20 mmol/L    Urea Nitrogen 17 6 - 23 mg/dL    Creatinine 1.17 (H) 0.50 - 1.05 mg/dL    eGFR 45 (L) >60 mL/min/1.73m*2    Calcium 8.8 8.6 - 10.3 mg/dL    Phosphorus 4.3 2.5 - 4.9 mg/dL    Albumin 3.1 (L) 3.4 - 5.0 g/dL   Magnesium   Result Value Ref Range    Magnesium 2.00 1.60 - 2.40 mg/dL   CBC   Result Value Ref Range    WBC 9.5 4.4 - 11.3 x10*3/uL    nRBC 0.0 " 0.0 - 0.0 /100 WBCs    RBC 4.12 4.00 - 5.20 x10*6/uL    Hemoglobin 13.9 12.0 - 16.0 g/dL    Hematocrit 42.6 36.0 - 46.0 %     (H) 80 - 100 fL    MCH 33.7 26.0 - 34.0 pg    MCHC 32.6 32.0 - 36.0 g/dL    RDW 15.5 (H) 11.5 - 14.5 %    Platelets 138 (L) 150 - 450 x10*3/uL   Transthoracic Echo (TTE) Complete   Result Value Ref Range    BSA 1.53 m2       Scheduled medications  amoxicillin-pot clavulanate, 1 tablet, oral, BID  aspirin, 81 mg, oral, Daily  atorvastatin, 10 mg, oral, Daily  calcium carbonate-vitamin D3, 1 tablet, oral, BID  clopidogrel, 75 mg, oral, Daily  cyanocobalamin, 1,000 mcg, oral, Daily  enoxaparin, 30 mg, subcutaneous, q24h  folic acid, 1 mg, oral, Daily  furosemide, 20 mg, oral, Daily  insulin glargine, 10 Units, subcutaneous, Nightly  insulin lispro, 0-10 Units, subcutaneous, TID  ipratropium-albuteroL, 3 mL, nebulization, TID  levothyroxine, 88 mcg, oral, Daily  losartan, 100 mg, oral, Daily  mirtazapine, 7.5 mg, oral, Nightly  pantoprazole, 20 mg, oral, Daily before breakfast  potassium chloride CR, 20 mEq, oral, Daily  sennosides-docusate sodium, 1 tablet, oral, BID      Continuous medications     PRN medications  PRN medications: dextrose, dextrose, glucagon, glucagon, ipratropium-albuteroL, oxygen    IMAGING:  CT chest w/IV contrast 6/28/2024:  1.  Consolidative airspace opacity in the left upper lobe with few  scattered surrounding ground-glass opacities is most likely favored  to represent pneumonia. This likely corresponds to the rounded  airspace opacity seen on immediate prior radiograph. However  recommend a short interval follow-up radiograph in 3-6 weeks to  document resolution and to exclude an underlying malignancy.  2. Moderate volume left-sided pleural effusion.  3. Mild circumferential thickening of the distal thoracic esophagus.  Recommend correlation with reflux.  4. Other stable chronic findings as described above.                           Assessment/Plan   Active  Problems:    Mass of left lung    Summary:  89 y.o. female admitted on 6/26/2024  3:39 AM presenting with shortness of breath found to have a large left pleural effusion.  Pleural effusion was first noticed in September 2023 and at that time was small.  There were also no apparent left-sided nodules or masses at that time.  She is feeling better after 1.3L thoracentesis on 6/26/2024, however, post-procedure chest x-ray demonstrating a left lung mass. This was followed up with CT chest which re-classified the mass as a consolidation. Pleural fluid is now consistent with a complicated parapneumonic effusion. She still has a moderate-large sized effusion on the left, and will need to be drained. She is agreeable for thoracentesis.     Recommendations:  -Will perform bedside thoracentesis this morning  -Please do not discharge patient until thoracentesis is completed. She can be discharged afterward  -Follow up results of original pleural fluid cytology.  -Recommend follow up CT chest in 2 months           Taylor Davis, DO  Pulmonary & Critical Care  6/28/2024 10:49 AM    Addendum 2:08 PM  Post-procedure chest x-ray demonstrated a large left pneumothorax. I went to reassess the patient. She was hemodynamically stable without respiratory distress. SpO2 on room air was 95%. I placed her on 5L/min oxygen therapy. Ultrasound confirmed absence of lung sliding in multiple locations. I recommended chest tube for re-expansion to which she agreed. Was able to insert chest tube at bedside without difficulty. I called her granddaughter to update her about this morning's events. Primary team updated as well. Keep chest tube to suction for today. Repeat chest x-ray pending.

## 2024-06-28 NOTE — PROGRESS NOTES
Occupational Therapy                 Therapy Communication Note    Patient Name: Natalya Lira  MRN: [id number]  Today's Date: 6/28/2024     Discipline: Occupational Therapy    Missed Visit Reason:      Missed Time:  1326 Attempt    Comment: Chart Reviewed. Attempted to see patient. Pt in procedure at time. Discussion with RN, pt was to be discharged today however has a Pneumothorax and getting chest tube placed. No treatment at this time.

## 2024-06-28 NOTE — DISCHARGE SUMMARY
Discharge Diagnosis  Acute hypoxic respiratory failure (Multi)  Community acquired pneumonia  Large left pleural effusion    Issues Requiring Follow-Up  Followup with primary care provider as well as pulmonology after discharge. Can review further pleural fluid analysis at followups.    Discharge Meds     Your medication list        START taking these medications        Instructions Last Dose Given Next Dose Due   amoxicillin-pot clavulanate 500-125 mg tablet  Commonly known as: Augmentin      Take 1 tablet by mouth 2 times a day for 13 doses.       potassium chloride CR 20 mEq ER tablet  Commonly known as: Klor-Con M20  Replaces: potassium chloride CR 10 mEq ER tablet      Take 1 tablet (20 mEq) by mouth once daily. Do not crush or chew.              CHANGE how you take these medications        Instructions Last Dose Given Next Dose Due   atorvastatin 10 mg tablet  Commonly known as: Lipitor  What changed:   medication strength  how much to take      Take 1 tablet (10 mg) by mouth once daily.       calcium carbonate-vitamin D3 500 mg-5 mcg (200 unit) tablet  What changed: how much to take      Take 1 tablet by mouth 2 times a day.       pantoprazole 20 mg EC tablet  Commonly known as: ProtoNix  Start taking on: June 29, 2024  What changed:   medication strength  how much to take      Take 1 tablet (20 mg) by mouth once daily in the morning. Take before meals. Do not crush, chew, or split.              CONTINUE taking these medications        Instructions Last Dose Given Next Dose Due   aspirin 81 mg EC tablet           clopidogrel 75 mg tablet  Commonly known as: Plavix      Take 1 tablet (75 mg) by mouth once daily.       Cozaar 100 mg tablet  Generic drug: losartan           cranberry extract 250 mg capsule           cyanocobalamin 1,000 mcg tablet  Commonly known as: Vitamin B-12           ferrous gluconate 324 (38 Fe) mg tablet  Commonly known as: Fergon           folic acid 1 mg tablet  Commonly known as:  Folvite           furosemide 20 mg tablet  Commonly known as: Lasix      Take 1 tablet (20 mg) by mouth once daily.       glimepiride 4 mg tablet  Commonly known as: Amaryl           levothyroxine 88 mcg tablet  Commonly known as: Synthroid, Levoxyl  Start taking on: June 29, 2024      Take 1 tablet (88 mcg) by mouth early in the morning.. Take on an empty stomach at the same time each day, either 30 to 60 minutes prior to breakfast       methotrexate 2.5 mg tablet  Commonly known as: Trexall           mirtazapine 7.5 mg tablet  Commonly known as: Remeron           Premarin vaginal cream  Generic drug: estrogens (conjugated)           Stool Softener 100 mg capsule  Generic drug: docusate sodium                  STOP taking these medications      potassium chloride CR 10 mEq ER tablet  Commonly known as: Klor-Con  Replaced by: potassium chloride CR 20 mEq ER tablet        sulfamethoxazole-trimethoprim 800-160 mg tablet  Commonly known as: Bactrim DS                  Where to Get Your Medications        These medications were sent to [facility]  [id number] , [facility] [address]      Hours: 9 AM to 5 PM Mon-Fri Phone: [phone]   amoxicillin-pot clavulanate 500-125 mg tablet  atorvastatin 10 mg tablet  calcium carbonate-vitamin D3 500 mg-5 mcg (200 unit) tablet  clopidogrel 75 mg tablet  furosemide 20 mg tablet  levothyroxine 88 mcg tablet  pantoprazole 20 mg EC tablet  potassium chloride CR 20 mEq ER tablet         Test Results Pending At Discharge  Pending Labs       Order Current Status    Non-gynecologic cytology In process    Blood Culture Preliminary result    Blood Culture Preliminary result    Sterile Fluid Culture/Smear Preliminary result          Hospital Course  Brief HPI:  Natalya Lira is a 89 y.o. female with T2DM, HTN, HLD, HFpEF 50% ef 7/2021, GERD, hypothyroidism, rheumatoid arthritis on methotrexate, CAD s/p PCI to RCA and LAD, hx aortic stenosis s/p TAVR, CKD3, hx mild cognitive  impairment, macular degeneration of eyes, hx dvt and took Eliquis before who presents due to shortness of breath     Hospital Course:  ED course close patient was significant for high blood pressure of 202/122, 103 heart rate, 88% on room air which is why she was subsequently placed on 3 L.  Her BNP was elevated at 776.  Troponin were initially elevated at 79 down trended to 73.  Imaging was significant for large left-sided pleural effusion with dense left basilar airspace disease.  Patient was given IV Lasix once, hydralazine for high blood pressure, placed on azithromycin and ceftriaxone and transferred to the floor.  On floors, she was continued to be managed for concern for CAP with azithromycin and ceftriaxone.  Pulm was consulted for thoracentesis.  Patient subsequently had IR guided thoracentesis on 06/26/2024.  Cardiology was also consulted and an echo was ordered for her. CT chest was ordered for lung mass and showed consolidative airspace opacity in the left upper lobe with few scattered surrounding ground-glass opacities is most likely favored to represent pneumonia. This likely corresponds to the rounded airspace opacity seen on immediate prior radiograph.     On discharge patient was instructed to continue taking PO Augmentin for 13 more doses with a stop date on July 5, 2024. She was instructed to follow up with Pulmonology and PCP after discharge for pleural fluid cytology results and lung mass/consolidation which will need further imaging in 3 to 6 weeks for monitoring.     She was hemodynamically stable for discharge on 6/28/24.     Pertinent Physical Exam At Time of Discharge  Physical Exam  Constitutional:       General: She is not in acute distress.     Appearance: Normal appearance.   HENT:      Head: Normocephalic and atraumatic.      Mouth/Throat:      Mouth: Mucous membranes are moist.   Eyes:      Conjunctiva/sclera: Conjunctivae normal.      Pupils: Pupils are equal, round, and reactive to  light.   Cardiovascular:      Rate and Rhythm: Normal rate and regular rhythm.      Heart sounds: Normal heart sounds.   Pulmonary:      Effort: No respiratory distress.      Breath sounds: Normal breath sounds. No wheezing or rhonchi.   Abdominal:      General: Bowel sounds are normal.      Palpations: Abdomen is soft.      Tenderness: There is no abdominal tenderness.   Musculoskeletal:         General: No swelling.      Cervical back: Neck supple.   Skin:     General: Skin is warm and dry.   Neurological:      General: No focal deficit present.      Mental Status: She is alert.         Outpatient Follow-Up  No future appointments.      Que Mckeon DO

## 2024-06-28 NOTE — PROCEDURES
Procedure: Thoracentesis    Indication: Parapneumonic effusion    Procedure narrative: After informed consent was obtained, patient was positioned upright. Ultrasound was used to locate a pocket of fluid. Fluid appeared simple on ultrasound. Patient's left side was then prepped and draped in sterile fashion. 8 mL of 1% lidocaine used for local anesthesia. A small incision was made with #11 scalpel blade. Next, an 8Fr catheter over needle was passed into the left pleural space and straw-colored, clear fluid was aspirated. Catheter was advanced and needle was removed. A total of 900 mL fluid was drained. No studies were sent as she just had pleural fluid analysis 2 days ago. Catheter was removed and bandage applied after hemostasis achieved. EBL was minimal. Patient tolerated procedure well without immediate complications.    Recommendations: Follow up post-procedure chest x-ray.

## 2024-06-28 NOTE — PROCEDURES
Chest Tube Insertion    Date/Time: 6/28/2024 2:20 PM    Performed by: Taylor Davis DO  Authorized by: Taylor Davis DO    Consent:     Consent obtained:  Verbal    Consent given by:  Patient    Risks, benefits, and alternatives were discussed: yes      Risks discussed:  Bleeding, damage to surrounding structures, infection and pain    Alternatives discussed:  Observation, delayed treatment, no treatment and alternative treatment  Universal protocol:     Procedure explained and questions answered to patient or proxy's satisfaction: yes      Site/side marked: yes      Immediately prior to procedure, a time out was called: yes      Patient identity confirmed:  Verbally with patient  Pre-procedure details:     Skin preparation:  Chlorhexidine    Preparation: Patient was prepped and draped in the usual sterile fashion    Sedation:     Sedation type:  None  Anesthesia:     Anesthesia method:  Local infiltration    Local anesthetic:  Lidocaine 1% w/o epi  Procedure details:     Placement location:  L lateral    Scalpel size:  11    Tube size (Jordanian): 14.    Ultrasound guidance: yes      Tension pneumothorax: no      Tube connected to:  Suction    Drainage characteristics:  Air only    Suture material:  2-0 silk    Dressing:  4x4 sterile gauze  Post-procedure details:     Post-insertion x-ray findings: tube in good position      Procedure completion:  Tolerated well, no immediate complications  Comments:      No lung sliding noted in left axillary line. Patient's left 4th intercostal space at mid-axillary line was marked. 18 gauge insertion needle was inserted into left pleural space and air was suctioned. Seldinger technique used to insert guidewire through 18 gauge needle. Needle removed. Skin incised with scalpel. 14Fr Dilator passed over guidewire and removed. 14Fr Marlon pigtail catheter was passed over the guidewire into the pleural space. Guidewire and obturator were removed. Pigtail connected to Atrium chamber  and air was immediately suctioned out. Pigtail was sutured into place. Sterile dressing applied. Post-procedure CXR confirmed insertion with near resolution of pneumothorax.

## 2024-06-28 NOTE — DISCHARGE INSTRUCTIONS
1) Please, take your home medications as instructed after being discharged from the hospital.    NEW MEDICATIONS:  Augmentin 500 mg - take 1 tablet my mouth every 12 hours for the next 7 days (STOP DATE 7/5/24).    2) Please, follow-up with your primary care provider within 7 to 14 days after leaving the hospital. /appointment services has been requested to make an appointment for you, however if you do not hear back from them within 1 to 2 days, please call your primary physician's office to schedule an appointment. Bring your photo ID and insurance card to your appointment.   [facility]  services can be reached at [phone].    - Please, call   or appointment services and schedule a follow-up with your PCP and Pulmonologist (lung doctor) within 1-2 weeks after you leave the hospital to discuss pleural fluid cytology results (laboratory study of the fluid that was around your lungs) and lung mass/consolidation which will need further imaging in 3 to 6 weeks.     3) If you experience any worsening symptoms or have any concerns, please contact your primary care provider to schedule an appointment. If you cannot get in touch with your primary care physician, please return to the nearest emergency room or urgent care clinic for an evaluation and treatment.    Thank you for choosing [facility] and allowing us to partake in your medical care!    - Your [facility] inpatient primary care team.

## 2024-06-28 NOTE — PROGRESS NOTES
06/28/24 1136   Discharge Planning   Living Arrangements Family members  (lives with granddaughter)   Support Systems Family members   Assistance Needed X3, independent in ADLS, at times assist from family, has a rollator and shower chair   Type of Residence Private residence   Number of Stairs to Enter Residence 5   Number of Stairs Within Residence 0   Do you have animals or pets at home? No   Who is requesting discharge planning? Provider   Home or Post Acute Services In home services   Type of Home Care Services Home PT   Patient expects to be discharged to: Home with granddaughter and [facility] (PT only-currently [facility] only has PT available, spoke to granddaughter who still wanted [facility] for PT only). Orders sent via [email or web address].   Does the patient need discharge transport arranged? No   Patient Choice   Provider Choice list and CMS website (https://medicare.gov/care-compare#search) for post-acute Quality and Resource Measure Data were provided and reviewed with: Family   Patient / Family choosing to utilize agency / facility established prior to hospitalization No

## 2024-06-28 NOTE — PROGRESS NOTES
Natalya Lira is a 89 y.o. female on day 1 of admission presenting with Acute hypoxic respiratory failure (Multi).      Subjective   She is sitting up in the bed, states she feels better, not as short of breath. S/p thoracentesis 1.3L removed      Review of systems:  Constitutional: negative for fever, chills, or malaise  Neuro: negative for dizziness, headache, numbness, tingling  ENT: Negative for nasal congestion or sore throat  Resp: negative for shortness of breath, cough, or wheezing  CV: negative for chest pain, palpitations  GI: negative for abd pain, nausea, vomiting or diarrhea  : negative for dysuria, frequency, or urgency  Skin: negative for lesions, wounds, or rash  Musculoskeletal: Negative for weakness, myalgia, or arthralgia  Endocrine: Negative for polyuria or polydipsia         Objective   Constitutional: Well developed, awake/alert/oriented x3, no distress, alert and cooperative  Eyes: PERRL, EOMI, clear sclera  ENMT: mucous membranes moist, no apparent injury, no lesions seen  Head/Neck: Neck supple, no apparent injury, thyroid without mass or tenderness, No JVD, trachea midline, no bruits  Respiratory/Thorax: Patent airways, CTAB, normal breath sounds with good chest expansion, thorax symmetric  Cardiovascular: Regular, rate and rhythm, no murmurs, 2+ equal pulses of the extremities, normal S 1and S 2  Gastrointestinal: Nondistended, soft, non-tender, no rebound tenderness or guarding, no masses palpable, no organomegaly, +BS, no bruits  Musculoskeletal: ROM intact, no joint swelling, normal strength  Extremities: normal extremities, no cyanosis edema, contusions or wounds, no clubbing  Neurological: alert and oriented x3, intact senses, motor, response and reflexes, normal strength  Lymphatic: No significant lymphadenopathy  Psychological: Appropriate mood and behavior  Skin: Warm and dry, no lesions, no rashes      Last Recorded Vitals  /64 (BP Location: Right arm, Patient Position:  "Sitting)   Pulse 81   Temp 36.6 °C (97.9 °F) (Temporal)   Resp 15   Ht 1.499 m (4' 11.02\")   Wt 58.2 kg (128 lb 3.2 oz)   SpO2 97%   BMI 25.88 kg/m²     Intake/Output last 3 Shifts:  I/O last 3 completed shifts:  In: 2100 (36.1 mL/kg) [P.O.:1800; IV Piggyback:300]  Out: 875 (15 mL/kg) [Urine:875 (0.4 mL/kg/hr)]  Dosing Weight: 58.2 kg   I/O this shift:  In: 490 [P.O.:240; IV Piggyback:250]  Out: -     Relevant Results  Scheduled medications  amoxicillin-pot clavulanate, 1 tablet, oral, BID  aspirin, 81 mg, oral, Daily  atorvastatin, 10 mg, oral, Daily  calcium carbonate-vitamin D3, 1 tablet, oral, BID  clopidogrel, 75 mg, oral, Daily  cyanocobalamin, 1,000 mcg, oral, Daily  enoxaparin, 30 mg, subcutaneous, q24h  folic acid, 1 mg, oral, Daily  furosemide, 20 mg, oral, Daily  insulin glargine, 10 Units, subcutaneous, Nightly  insulin lispro, 0-10 Units, subcutaneous, TID  ipratropium-albuteroL, 3 mL, nebulization, TID  levothyroxine, 88 mcg, oral, Daily  losartan, 100 mg, oral, Daily  mirtazapine, 7.5 mg, oral, Nightly  pantoprazole, 20 mg, oral, Daily before breakfast  potassium chloride CR, 20 mEq, oral, Daily  sennosides-docusate sodium, 1 tablet, oral, BID      Continuous medications     PRN medications  PRN medications: dextrose, dextrose, glucagon, glucagon, ipratropium-albuteroL, oxygen    Results for orders placed or performed during the hospital encounter of 06/26/24 (from the past 24 hour(s))   Renal function panel   Result Value Ref Range    Glucose 148 (H) 74 - 99 mg/dL    Sodium 139 136 - 145 mmol/L    Potassium 3.6 3.5 - 5.3 mmol/L    Chloride 100 98 - 107 mmol/L    Bicarbonate 30 21 - 32 mmol/L    Anion Gap 13 10 - 20 mmol/L    Urea Nitrogen 17 6 - 23 mg/dL    Creatinine 1.19 (H) 0.50 - 1.05 mg/dL    eGFR 44 (L) >60 mL/min/1.73m*2    Calcium 8.7 8.6 - 10.3 mg/dL    Phosphorus 3.4 2.5 - 4.9 mg/dL    Albumin 3.1 (L) 3.4 - 5.0 g/dL   Magnesium   Result Value Ref Range    Magnesium 1.77 1.60 - 2.40 " mg/dL   CBC   Result Value Ref Range    WBC 8.3 4.4 - 11.3 x10*3/uL    nRBC 0.0 0.0 - 0.0 /100 WBCs    RBC 4.17 4.00 - 5.20 x10*6/uL    Hemoglobin 13.5 12.0 - 16.0 g/dL    Hematocrit 41.3 36.0 - 46.0 %    MCV 99 80 - 100 fL    MCH 32.4 26.0 - 34.0 pg    MCHC 32.7 32.0 - 36.0 g/dL    RDW 15.0 (H) 11.5 - 14.5 %    Platelets 139 (L) 150 - 450 x10*3/uL   POCT GLUCOSE   Result Value Ref Range    POCT Glucose 119 (H) 74 - 99 mg/dL   POCT GLUCOSE   Result Value Ref Range    POCT Glucose 274 (H) 74 - 99 mg/dL   POCT GLUCOSE   Result Value Ref Range    POCT Glucose 86 74 - 99 mg/dL   POCT GLUCOSE   Result Value Ref Range    POCT Glucose 210 (H) 74 - 99 mg/dL       CT chest w IV contrast   Final Result   1.  Consolidative airspace opacity in the left upper lobe with few   scattered surrounding ground-glass opacities is most likely favored   to represent pneumonia. This likely corresponds to the rounded   airspace opacity seen on immediate prior radiograph. However   recommend a short interval follow-up radiograph in 3-6 weeks to   document resolution and to exclude an underlying malignancy.   2. Moderate volume left-sided pleural effusion.   3. Mild circumferential thickening of the distal thoracic esophagus.   Recommend correlation with reflux.   4. Other stable chronic findings as described above.        MACRO:   Critical Finding:  See findings. Notification was initiated on   6/27/2024 at 1:16 pm by Dr. Ning Hansen.  (**-YCF-**)   Instructions:        Signed by: Ning Hansen 6/27/2024 1:16 PM   Dictation workstation:   [id number]      XR chest 1 view   Final Result   1. Interval decrease in left sided pleural effusion status post   thoracentesis with mild residual. Ill-defined rounded airspace   opacity in the left mid lung zone could be related to layering   pleural effusion, however underlying mass lesion versus pneumonia can   also be considered in the differential. Recommend short interval   follow-up  radiograph versus a CT chest in 3-6 weeks to document   resolution.   2. Age-indeterminate transverse fracture through left humeral neck,   similar compared to prior imaging.                  MACRO:   Critical Finding:  See findings. Notification was initiated on   6/27/2024 at 10:16 am by Dr. Ning Hansen.  (**-YCF-**)   Instructions:        Signed by: Ning Hansen 6/27/2024 10:16 AM   Dictation workstation:   [id number]      US thoracentesis   Final Result   Uneventful  left-sided thoracentesis, as detailed above   .        Signed by: Albin Urban 6/26/2024 3:53 PM   Dictation workstation:   [id number]      Transthoracic Echo (TTE) Complete    (Results Pending)       No echocardiogram results found for the past 12 months         Assessment/Plan   Principal Problem:    Acute hypoxic respiratory failure (Multi)  Active Problems:    Pleural effusion    Mass of left lung    Echocardiogram from January 2024, LVEF 50%, left atrial enlargement, normal functioning TAVR, severe mitral annular calcification with mild mitral stenosis, moderate MR, mild TR, moderate LVH and abnormal LV relaxation, mild pulmonary hypertension     Acute on chronic diastolic CHF  -  -CXR showed large left pleural effusion  -I reviewed the Echocardiogram as above  -Strict I & Os  -Daily weights  -2gm na diet  -1500mL fluid restriction  -Cont lasix  -s/p thoracentesis 1.3L removed     2. Elevated troponin, chest pain-Non MI elevated troponin 2/2 CHF  -[facility] in 2021 showed patent LAD stent, s/p PCI/PAMELA to mid RCA  -EKG reviewed, old inferior and anterolateral infarct  -Troponin downtrending  -Cont asa and Plavix  -Cont statin      3. Possible pneumonia  -CXR showed large pleural effusion, possible pneumonia  -Procal pending  -antibiotics  -bronchodilators  -oxygen support  -sputum culture  -blood cultures     4. Hypertension  -stable  -2gm na diet  -cont meds  -monitor     5. Possible UTI  -Urine culture  -Antibiotics       Barb  VIVIAN Coto, APRN-CNP

## 2024-06-28 NOTE — PROGRESS NOTES
Internal Medicine - Daily Progress Note   Hospital Day: 3       Name:Natalya Lira, AGE: 89 y.o., GENDER: female, MRN: [id number], ROOM: 252/252-A   CODE STATUS: DNR and No Intubation  Attending Physician: Ray Frank DO  Resident: Shira Escamilla MD        Chief Complaint   No chief complaint on file.       Subjective    Natalya Lira is a 89 y.o. year old female patient on Hospital Day: 3 presenting with Acute hypoxic respiratory failure (Multi).    Overnight events:  - No acute events overnight.  Patient seen and examined at bedside today. Had no active or acute complaints. Was seen satting appropriately on room air.    Meds    Scheduled medications  amoxicillin-pot clavulanate, 1 tablet, oral, BID  aspirin, 81 mg, oral, Daily  atorvastatin, 10 mg, oral, Daily  calcium carbonate-vitamin D3, 1 tablet, oral, BID  clopidogrel, 75 mg, oral, Daily  cyanocobalamin, 1,000 mcg, oral, Daily  enoxaparin, 30 mg, subcutaneous, q24h  folic acid, 1 mg, oral, Daily  furosemide, 20 mg, oral, Daily  insulin glargine, 10 Units, subcutaneous, Nightly  insulin lispro, 0-10 Units, subcutaneous, TID  ipratropium-albuteroL, 3 mL, nebulization, TID  levothyroxine, 88 mcg, oral, Daily  losartan, 100 mg, oral, Daily  mirtazapine, 7.5 mg, oral, Nightly  oxygen, , inhalation, Continuous - Inhalation  pantoprazole, 20 mg, oral, Daily before breakfast  potassium chloride CR, 20 mEq, oral, Daily  sennosides-docusate sodium, 1 tablet, oral, BID      Continuous medications     PRN medications  PRN medications: dextrose, dextrose, glucagon, glucagon, ipratropium-albuteroL, oxygen     Objective    Physical Exam  Constitutional:       Appearance: Normal appearance.   HENT:      Head: Normocephalic and atraumatic.      Mouth/Throat:      Mouth: Mucous membranes are moist.   Cardiovascular:      Rate and Rhythm: Normal rate and regular rhythm.      Heart sounds: No murmur heard.     No friction rub. No gallop.   Pulmonary:      Comments:  "Bilaterally reduced lung sounds  Abdominal:      General: Abdomen is flat. Bowel sounds are normal.      Palpations: Abdomen is soft. There is no mass.      Tenderness: There is no abdominal tenderness. There is no guarding.   Musculoskeletal:         General: No tenderness. Normal range of motion.      Cervical back: Normal range of motion.      Right lower leg: Edema present.      Left lower leg: Edema present.   Neurological:      Mental Status: She is alert and oriented to person, place, and time.      Comments: But confused        Visit Vitals  /64 (BP Location: Right arm, Patient Position: Sitting)   Pulse 79   Temp 35.9 °C (96.6 °F) (Temporal)   Resp 15   Ht 1.499 m (4' 11.02\")   Wt 55.9 kg (123 lb 3.8 oz)   SpO2 96%   BMI 24.88 kg/m²   Smoking Status Former   BSA 1.53 m²        Intake/Output Summary (Last 24 hours) at 6/28/2024 1240  Last data filed at 6/28/2024 0826  Gross per 24 hour   Intake 1330 ml   Output --   Net 1330 ml       Labs:   Results from last 72 hours   Lab Units 06/28/24  0806 06/27/24  0531 06/26/24  0600   SODIUM mmol/L 136 139 140   POTASSIUM mmol/L 4.2 3.6 3.6   CHLORIDE mmol/L 100 100 103   CO2 mmol/L 28 30 26   BUN mg/dL 17 17 16   CREATININE mg/dL 1.17* 1.19* 1.03   GLUCOSE mg/dL 137* 148* 209*   CALCIUM mg/dL 8.8 8.7 8.7   ANION GAP mmol/L 12 13 15   EGFR mL/min/1.73m*2 45* 44* 52*   PHOSPHORUS mg/dL 4.3 3.4 2.9      Results from last 72 hours   Lab Units 06/28/24  0806 06/27/24  0531 06/26/24  0600 06/25/24  1655   WBC AUTO x10*3/uL 9.5 8.3 8.8 8.9   HEMOGLOBIN g/dL 13.9 13.5 14.0 14.5   HEMATOCRIT % 42.6 41.3 42.6 43.3   PLATELETS AUTO x10*3/uL 138* 139* 157 170   NEUTROS PCT AUTO %  --   --  80.6 83.2   LYMPHS PCT AUTO %  --   --  12.9 12.0   MONOS PCT AUTO %  --   --  5.0 3.8   EOS PCT AUTO %  --   --  0.8 0.2      Lab Results   Component Value Date    CALCIUM 8.8 06/28/2024    PHOS 4.3 06/28/2024      Lab Results   Component Value Date    CRP 1.59 (H) 06/26/2024 " "      Micro/ID:   Susceptibility data from last 90 days.  Collected Specimen Info Organism   06/25/24 Urine from Clean Catch/Voided Gram Negative Bacilli                    No lab exists for component: \"AGALPCRNB\"     Lab Results   Component Value Date    URINECULTURE 20,000 - 80,000 Gram Negative Bacilli (A) 06/25/2024    BLOODCULT No growth at 1 day 06/26/2024    BLOODCULT No growth at 1 day 06/26/2024       Images    ECG 12 lead  Result Date: 6/27/2024  Sinus rhythm with occasional Premature ventricular complexes Left axis deviation Right bundle branch block Possible Lateral infarct (cited on or before 25-JUN-2024) Inferior infarct , age undetermined Abnormal ECG When compared with ECG of 09-SEP-2023 21:22, Previous ECG has undetermined rhythm, needs review Left anterior fascicular block is no longer Present Inferior infarct is now Present Questionable change in initial forces of Lateral leads    CT chest w IV contrast  Result Date: 6/27/2024  1.  Consolidative airspace opacity in the left upper lobe with few scattered surrounding ground-glass opacities is most likely favored to represent pneumonia. This likely corresponds to the rounded airspace opacity seen on immediate prior radiograph. However recommend a short interval follow-up radiograph in 3-6 weeks to document resolution and to exclude an underlying malignancy.   2. Moderate volume left-sided pleural effusion.   3. Mild circumferential thickening of the distal thoracic esophagus. Recommend correlation with reflux.   4. Other stable chronic findings as described above.   MACRO: Critical Finding:  See findings. Notification was initiated on 6/27/2024 at 1:16 pm by Dr. Ning Hansen.  (**-YCF-**) Instructions:   Signed by: Ning Hansen 6/27/2024 1:16 PM Dictation workstation:   [id number]    XR chest 1 view  Result Date: 6/27/2024  1. Interval decrease in left sided pleural effusion status post thoracentesis with mild residual. Ill-defined rounded " airspace opacity in the left mid lung zone could be related to layering pleural effusion, however underlying mass lesion versus pneumonia can also be considered in the differential. Recommend short interval follow-up radiograph versus a CT chest in 3-6 weeks to document resolution. 2. Age-indeterminate transverse fracture through left humeral neck, similar compared to prior imaging.       MACRO: Critical Finding:  See findings. Notification was initiated on 6/27/2024 at 10:16 am by Dr. Ning Hansen.  (**-YCF-**) Instructions:   Signed by: Ning Hansen 6/27/2024 10:16 AM Dictation workstation:   [id number]    US thoracentesis  Result Date: 6/26/2024  Uneventful  left-sided thoracentesis, as detailed above .   Signed by: Albin Urban 6/26/2024 3:53 PM Dictation workstation:   [id number]    XR chest 1 view  Result Date: 6/25/2024  1.  Large left pleural effusion with dense left basilar airspace disease. Underlying pneumonia is in the differential. 2. Hyperinflated lungs.       MACRO: None   Signed by: Beny Root 6/25/2024 5:09 PM Dictation workstation:   [id number]      Assessment and Plan    Natalya Lira is a 89 y.o. female admitted on 6/26/2024 who presents due to shortness of breath, found with acute hypoxic respiratory failure due to pleural effusion and suspected pneumonia. Has PMHx significant for T2DM, HTN, HLD, HFpEF 50% ef 7/2021, GERD, hypothyroidism, rheumatoid arthritis on methotrexate, CAD s/p PCI to RCA and LAD, hx aortic stenosis s/p TAVR 2021, hx DVT was on Eliquis, CKD3, hx mild cognitive impairment, macular degeneration of eyes, hx dvt (previously on eliquis).  Pulmonology on board.  Currently patient status post thoracentesis on 06/26/2024 with 1300 cc fluid removal    ACUTE MEDICAL ISSUES:  # Acute Pneumothorax:  - CT chest done yesterday showed Moderate volume left-sided pleural effusion  - Pulmonology on board, pt. S/p Lt sided thoracentesis and 900 ml fluid was removed.   - s/p  there was concern for patient developing acute pneumothorax  [ ] CXR ordered; pending  - Pulm to place chest tube in the patient    # Acute hypoxic respiratory failure -   # Large Left pleural effusion - Improved after thoracentesis   # Suspected community acquired pneumonia   - Sx: Nonresolving shortness of breath  - Labs: No leukocytosis, elevated BNP at 776, elevated CRP at 1.59.  Troponins 79 --> 73, Procal 0.04.  Strep pneumo, Legionella negative. Blood cultures drawn on 06/26 NG1D  - Imaging:  CXR Large left pleural effusion with dense left basilar airspace disease. Underlying pneumonia is in the differential.   - Echo from 08/02/2021: LVEF 50%, mildly dilated LA, moderate LVH, normal appearance TAVR, mild periprosthetic aortic regurgitstion, moderate MR, mild to moderate TR, moderate pulm HTN  - Abx: Augmentin 875 - 125 mg BID for days (06/27/2024 - 007/04/2024) and Azithro 500 mg every day  (06/26/2024 - 06/28/2024)   - Tx: Lasix 20 mg every day.  On potassium 20 mEq supplement for diuretic induced hypokalemia  [ ] Echo ordered; Pending  - Pulm consulted: Patient s/p IR guided thoracentesis done on 06/26/2024. CXR afterwards showed Interval decrease in left sided pleural effusion status post thoracentesis with mild residual  Ill-defined rounded airspace opacity in the left mid lung zone could be related to layering pleural effusion, however underlying mass lesion versus pneumonia can also be considered in the differential.   - CT chest showed onsolidative airspace opacity in the left upper lobe with few scattered surrounding ground-glass opacities is most likely favored to represent pneumonia. This likely corresponds to the rounded airspace opacity seen on immediate prior radiograph. However recommend a short interval follow-up radiograph in 3-6 weeks to document resolution and to exclude an underlying malignancy.   - Resp Regimen: Duoneb, Acapella + IS  - Supportive: I/O, 1500 ml fluid restriction, 2 g Na  restriction, supplemental oxygen to maintain O2 sats > 90%  - Follow up with PCP/ Pulm in 3-6 weeks with repeat CT     # Asymptomatic UTI:  - UC from 06/25/2024: 20,000 - 80,000 proteus mirabilis  - Abx: Currently being covered with  Augmentin 875 - 125 mg BID for 7 days for another problem    CHRONIC MEDICAL ISSUES:  # CAD s/p stent - Continue home aspirin 81 mg every day and Plavix 75 mg every day        # hx DVT - more than 1 year ago, was on Eliquis and was advised to stop due to multiple falls.   # hx aortic stenosis s/p TAVR - Evolut Pro+ 26mm 6/29/21 non mechanical.   # T2DM - hold home Glimepiride 4 mg twice daily. SSI #2 (0 to 10 units 3 times daily)  # HTN - On home losartan 100 mg every day             # hx rheumatoid arthritis - Hold methotrexate, continue folate 1 mg every day  # MDD / insomnia - On mirtazapine 7.5 nightly  # HLD - On atorvastatin 10 mg every day        # GERD - On protonix 20 mg every day           # Hypothyroidism - On levothyroxine 88 mcg every day          # Hypovitaminosis: On Vit B12 1000 mcg every day, folate 1 mg every day  # Constipation: On Roberta-Colace 8.6 - 50 mg 1 tab nightly    Fluids: None, 1500 ml fluid restriction  Electrolytes: Replete as needed    Nutrition: Cardiac + Diabetic   Antimicrobials: Ceftriaxone 1g Q24Hr  (6/26 - 06/30) and Azithro 500 mg every day      (6/26 - 06/28)   DVT ppx:  SCD; Lovenox 30 mg subcutaneous every day   GI ppx: protonix 20 mg every day   Catheter: External female catheter  Lines: 20 G PIV in rt. antecubital  Supplemental Oxygen: On RA  Emergency Contact: Extended Emergency Contact Information  Primary Emergency Contact: Phyllis Lira  Home Phone: [phone]  Relation: Grandchild  Secondary Emergency Contact: SorayaMigel  Address: [address] [place] [address]  Home Phone: [phone]  Relation: None   Code: DNR and No Intubation     Disposition: Currently repeat CT. Has no home going needs. PT/OT recommends low  intensity. Will be discharged home once primary issue resolved     Shira Escamilla MD  Internal Medicine, PGY- 1  06/28/24 at 12:40 PM     Disclaimer: Documentation completed with the information available at the time of input. The times in the chart may not be reflective of actual patient care times, interventions, or procedures. Documentation occurs after the physical care of the patient. This note was dictated by speech recognition. Minor errors in transcription may be present

## 2024-06-28 NOTE — PROGRESS NOTES
Natalya Lira is a 89 y.o. female on day 2 of admission presenting with Acute hypoxic respiratory failure (Multi).      Subjective   She is sitting up in the bed, states she feels better, not as short of breath. S/p 2nd thoracentesis today which removed 900mL      Review of systems:  Constitutional: negative for fever, chills, or malaise  Neuro: negative for dizziness, headache, numbness, tingling  ENT: Negative for nasal congestion or sore throat  Resp: negative for shortness of breath, cough, or wheezing  CV: negative for chest pain, palpitations  GI: negative for abd pain, nausea, vomiting or diarrhea  : negative for dysuria, frequency, or urgency  Skin: negative for lesions, wounds, or rash  Musculoskeletal: Negative for weakness, myalgia, or arthralgia  Endocrine: Negative for polyuria or polydipsia         Objective   Constitutional: Well developed, awake/alert/oriented x3, no distress, alert and cooperative  Eyes: PERRL, EOMI, clear sclera  ENMT: mucous membranes moist, no apparent injury, no lesions seen  Head/Neck: Neck supple, no apparent injury, thyroid without mass or tenderness, No JVD, trachea midline, no bruits  Respiratory/Thorax: Patent airways, CTAB, normal breath sounds with good chest expansion, thorax symmetric  Cardiovascular: Regular, rate and rhythm, no murmurs, 2+ equal pulses of the extremities, normal S 1and S 2  Gastrointestinal: Nondistended, soft, non-tender, no rebound tenderness or guarding, no masses palpable, no organomegaly, +BS, no bruits  Musculoskeletal: ROM intact, no joint swelling, normal strength  Extremities: normal extremities, no cyanosis edema, contusions or wounds, no clubbing  Neurological: alert and oriented x3, intact senses, motor, response and reflexes, normal strength  Lymphatic: No significant lymphadenopathy  Psychological: Appropriate mood and behavior  Skin: Warm and dry, no lesions, no rashes      Last Recorded Vitals  /64 (BP Location: Right arm,  "Patient Position: Sitting)   Pulse 79   Temp 35.9 °C (96.6 °F) (Temporal)   Resp 15   Ht 1.499 m (4' 11.02\")   Wt 55.9 kg (123 lb 3.8 oz)   SpO2 96%   BMI 24.88 kg/m²     Intake/Output last 3 Shifts:  I/O last 3 completed shifts:  In: 1390 (23.9 mL/kg) [P.O.:840; IV Piggyback:550]  Out: 275 (4.7 mL/kg) [Urine:275 (0.1 mL/kg/hr)]  Dosing Weight: 58.2 kg   I/O this shift:  In: 720 [P.O.:720]  Out: -     Relevant Results  Scheduled medications  amoxicillin-pot clavulanate, 1 tablet, oral, BID  aspirin, 81 mg, oral, Daily  atorvastatin, 10 mg, oral, Daily  calcium carbonate-vitamin D3, 1 tablet, oral, BID  clopidogrel, 75 mg, oral, Daily  cyanocobalamin, 1,000 mcg, oral, Daily  enoxaparin, 30 mg, subcutaneous, q24h  folic acid, 1 mg, oral, Daily  furosemide, 20 mg, oral, Daily  insulin glargine, 10 Units, subcutaneous, Nightly  insulin lispro, 0-10 Units, subcutaneous, TID  ipratropium-albuteroL, 3 mL, nebulization, TID  levothyroxine, 88 mcg, oral, Daily  losartan, 100 mg, oral, Daily  mirtazapine, 7.5 mg, oral, Nightly  oxygen, , inhalation, Continuous - Inhalation  pantoprazole, 20 mg, oral, Daily before breakfast  potassium chloride CR, 20 mEq, oral, Daily  sennosides-docusate sodium, 1 tablet, oral, BID      Continuous medications     PRN medications  PRN medications: dextrose, dextrose, glucagon, glucagon, ipratropium-albuteroL, oxygen    Results for orders placed or performed during the hospital encounter of 06/26/24 (from the past 24 hour(s))   POCT GLUCOSE   Result Value Ref Range    POCT Glucose 86 74 - 99 mg/dL   POCT GLUCOSE   Result Value Ref Range    POCT Glucose 210 (H) 74 - 99 mg/dL   POCT GLUCOSE   Result Value Ref Range    POCT Glucose 175 (H) 74 - 99 mg/dL   Renal function panel   Result Value Ref Range    Glucose 137 (H) 74 - 99 mg/dL    Sodium 136 136 - 145 mmol/L    Potassium 4.2 3.5 - 5.3 mmol/L    Chloride 100 98 - 107 mmol/L    Bicarbonate 28 21 - 32 mmol/L    Anion Gap 12 10 - 20 mmol/L "    Urea Nitrogen 17 6 - 23 mg/dL    Creatinine 1.17 (H) 0.50 - 1.05 mg/dL    eGFR 45 (L) >60 mL/min/1.73m*2    Calcium 8.8 8.6 - 10.3 mg/dL    Phosphorus 4.3 2.5 - 4.9 mg/dL    Albumin 3.1 (L) 3.4 - 5.0 g/dL   Magnesium   Result Value Ref Range    Magnesium 2.00 1.60 - 2.40 mg/dL   CBC   Result Value Ref Range    WBC 9.5 4.4 - 11.3 x10*3/uL    nRBC 0.0 0.0 - 0.0 /100 WBCs    RBC 4.12 4.00 - 5.20 x10*6/uL    Hemoglobin 13.9 12.0 - 16.0 g/dL    Hematocrit 42.6 36.0 - 46.0 %     (H) 80 - 100 fL    MCH 33.7 26.0 - 34.0 pg    MCHC 32.6 32.0 - 36.0 g/dL    RDW 15.5 (H) 11.5 - 14.5 %    Platelets 138 (L) 150 - 450 x10*3/uL   Transthoracic Echo (TTE) Complete   Result Value Ref Range    BSA 1.53 m2   POCT GLUCOSE   Result Value Ref Range    POCT Glucose 170 (H) 74 - 99 mg/dL       XR chest 1 view   Final Result   1.  Large pneumothorax on the left following thoracentesis. It is   possible there may be a component of ex vacuo change or trapped lung.                  MACRO:   None        Signed by: Javier Giraldo 6/28/2024 1:43 PM   Dictation workstation:   [id number]      Transthoracic Echo (TTE) Complete         CT chest w IV contrast   Final Result   1.  Consolidative airspace opacity in the left upper lobe with few   scattered surrounding ground-glass opacities is most likely favored   to represent pneumonia. This likely corresponds to the rounded   airspace opacity seen on immediate prior radiograph. However   recommend a short interval follow-up radiograph in 3-6 weeks to   document resolution and to exclude an underlying malignancy.   2. Moderate volume left-sided pleural effusion.   3. Mild circumferential thickening of the distal thoracic esophagus.   Recommend correlation with reflux.   4. Other stable chronic findings as described above.        MACRO:   Critical Finding:  See findings. Notification was initiated on   6/27/2024 at 1:16 pm by Dr. Ning Hansen.  (**-YCF-**)   Instructions:        Signed by:  Ning Hansen 6/27/2024 1:16 PM   Dictation workstation:   [id number]      XR chest 1 view   Final Result   1. Interval decrease in left sided pleural effusion status post   thoracentesis with mild residual. Ill-defined rounded airspace   opacity in the left mid lung zone could be related to layering   pleural effusion, however underlying mass lesion versus pneumonia can   also be considered in the differential. Recommend short interval   follow-up radiograph versus a CT chest in 3-6 weeks to document   resolution.   2. Age-indeterminate transverse fracture through left humeral neck,   similar compared to prior imaging.                  MACRO:   Critical Finding:  See findings. Notification was initiated on   6/27/2024 at 10:16 am by Dr. Ning Hansen.  (**-YCF-**)   Instructions:        Signed by: Ning Hansen 6/27/2024 10:16 AM   Dictation workstation:   [id number]      US thoracentesis   Final Result   Uneventful  left-sided thoracentesis, as detailed above   .        Signed by: Albin Urban 6/26/2024 3:53 PM   Dictation workstation:   [id number]          No echocardiogram results found for the past 12 months         Assessment/Plan   Active Problems:    Mass of left lung    Echocardiogram from January 2024, LVEF 50%, left atrial enlargement, normal functioning TAVR, severe mitral annular calcification with mild mitral stenosis, moderate MR, mild TR, moderate LVH and abnormal LV relaxation, mild pulmonary hypertension     Acute on chronic diastolic CHF  -  -CXR showed large left pleural effusion  -I reviewed the Echocardiogram as above  -Strict I & Os  -Daily weights  -2gm na diet  -1500mL fluid restriction  -Cont lasix  -s/p thoracentesis 1.3L removed and then additional 900mL removed today     2. Elevated troponin, chest pain-Non MI elevated troponin 2/2 CHF  -[facility] in 2021 showed patent LAD stent, s/p PCI/PAMELA to mid RCA  -EKG reviewed, old inferior and anterolateral infarct  -Troponin  downtrending  -Cont asa and Plavix  -Cont statin      3. Pneumonia  -CXR showed large pleural effusion, possible pneumonia  -Procal pending  -antibiotics  -bronchodilators  -oxygen support  -sputum culture  -blood cultures  -CT chest post thoracentesis showed consolidation  -Per pulmonary, pleural fluid consistent with complicated parapneumonic effusion     4. Hypertension  -stable  -2gm na diet  -cont meds  -monitor     5. UTI  -Urine culture positive for proteus mirabilis  -Antibiotics       Barb Coto, APRN-CNP

## 2024-06-28 NOTE — CARE PLAN
Home Oxygen eval complete. At rest on RA pt sating 97% while ambulating pt sating 95-93%. No home oxygen needed.

## 2024-06-28 NOTE — CARE PLAN
The patient's goals for the shift include  get some rest    The clinical goals for the shift include pt will remain safe during this shift.    Over the shift, the patient  was medicated as ordered and call light within reach for assistance during this shift.

## 2024-06-29 ENCOUNTER — APPOINTMENT (OUTPATIENT)
Dept: RADIOLOGY | Facility: HOSPITAL | Age: 89
End: 2024-06-29
Payer: MEDICARE

## 2024-06-29 PROBLEM — R91.8 MASS OF LEFT LUNG: Status: RESOLVED | Noted: 2024-06-27 | Resolved: 2024-06-29

## 2024-06-29 LAB
ALBUMIN SERPL BCP-MCNC: 3 G/DL (ref 3.4–5)
ANION GAP SERPL CALC-SCNC: 14 MMOL/L (ref 10–20)
ATRIAL RATE: 86 BPM
BUN SERPL-MCNC: 17 MG/DL (ref 6–23)
CALCIUM SERPL-MCNC: 8.5 MG/DL (ref 8.6–10.3)
CHLORIDE SERPL-SCNC: 99 MMOL/L (ref 98–107)
CO2 SERPL-SCNC: 24 MMOL/L (ref 21–32)
CREAT SERPL-MCNC: 1.36 MG/DL (ref 0.5–1.05)
EGFRCR SERPLBLD CKD-EPI 2021: 37 ML/MIN/1.73M*2
ERYTHROCYTE [DISTWIDTH] IN BLOOD BY AUTOMATED COUNT: 15.5 % (ref 11.5–14.5)
GLUCOSE BLD MANUAL STRIP-MCNC: 195 MG/DL (ref 74–99)
GLUCOSE BLD MANUAL STRIP-MCNC: 196 MG/DL (ref 74–99)
GLUCOSE BLD MANUAL STRIP-MCNC: 196 MG/DL (ref 74–99)
GLUCOSE BLD MANUAL STRIP-MCNC: 291 MG/DL (ref 74–99)
GLUCOSE SERPL-MCNC: 194 MG/DL (ref 74–99)
HCT VFR BLD AUTO: 39.5 % (ref 36–46)
HGB BLD-MCNC: 12.7 G/DL (ref 12–16)
MAGNESIUM SERPL-MCNC: 2.11 MG/DL (ref 1.6–2.4)
MCH RBC QN AUTO: 34 PG (ref 26–34)
MCHC RBC AUTO-ENTMCNC: 32.2 G/DL (ref 32–36)
MCV RBC AUTO: 106 FL (ref 80–100)
NRBC BLD-RTO: 0 /100 WBCS (ref 0–0)
P AXIS: 25 DEGREES
P OFFSET: 179 MS
P ONSET: 129 MS
PHOSPHATE SERPL-MCNC: 4.3 MG/DL (ref 2.5–4.9)
PLATELET # BLD AUTO: 152 X10*3/UL (ref 150–450)
POTASSIUM SERPL-SCNC: 4.4 MMOL/L (ref 3.5–5.3)
PR INTERVAL: 166 MS
Q ONSET: 212 MS
QRS COUNT: 14 BEATS
QRS DURATION: 110 MS
QT INTERVAL: 394 MS
QTC CALCULATION(BAZETT): 471 MS
QTC FREDERICIA: 444 MS
R AXIS: -66 DEGREES
RBC # BLD AUTO: 3.74 X10*6/UL (ref 4–5.2)
SODIUM SERPL-SCNC: 133 MMOL/L (ref 136–145)
T AXIS: 45 DEGREES
T OFFSET: 409 MS
VENTRICULAR RATE: 86 BPM
WBC # BLD AUTO: 9.1 X10*3/UL (ref 4.4–11.3)

## 2024-06-29 PROCEDURE — 71045 X-RAY EXAM CHEST 1 VIEW: CPT | Mod: 76

## 2024-06-29 PROCEDURE — 94640 AIRWAY INHALATION TREATMENT: CPT

## 2024-06-29 PROCEDURE — 1200000002 HC GENERAL ROOM WITH TELEMETRY DAILY

## 2024-06-29 PROCEDURE — 99232 SBSQ HOSP IP/OBS MODERATE 35: CPT | Performed by: INTERNAL MEDICINE

## 2024-06-29 PROCEDURE — 2500000002 HC RX 250 W HCPCS SELF ADMINISTERED DRUGS (ALT 637 FOR MEDICARE OP, ALT 636 FOR OP/ED)

## 2024-06-29 PROCEDURE — 71045 X-RAY EXAM CHEST 1 VIEW: CPT | Performed by: RADIOLOGY

## 2024-06-29 PROCEDURE — 82947 ASSAY GLUCOSE BLOOD QUANT: CPT

## 2024-06-29 PROCEDURE — 2500000001 HC RX 250 WO HCPCS SELF ADMINISTERED DRUGS (ALT 637 FOR MEDICARE OP): Performed by: NURSE PRACTITIONER

## 2024-06-29 PROCEDURE — 36415 COLL VENOUS BLD VENIPUNCTURE: CPT

## 2024-06-29 PROCEDURE — 2500000002 HC RX 250 W HCPCS SELF ADMINISTERED DRUGS (ALT 637 FOR MEDICARE OP, ALT 636 FOR OP/ED): Performed by: INTERNAL MEDICINE

## 2024-06-29 PROCEDURE — 94668 MNPJ CHEST WALL SBSQ: CPT

## 2024-06-29 PROCEDURE — 2500000005 HC RX 250 GENERAL PHARMACY W/O HCPCS: Performed by: INTERNAL MEDICINE

## 2024-06-29 PROCEDURE — 2500000004 HC RX 250 GENERAL PHARMACY W/ HCPCS (ALT 636 FOR OP/ED)

## 2024-06-29 PROCEDURE — 85027 COMPLETE CBC AUTOMATED: CPT

## 2024-06-29 PROCEDURE — 80069 RENAL FUNCTION PANEL: CPT

## 2024-06-29 PROCEDURE — 83735 ASSAY OF MAGNESIUM: CPT

## 2024-06-29 PROCEDURE — 99232 SBSQ HOSP IP/OBS MODERATE 35: CPT

## 2024-06-29 PROCEDURE — 71045 X-RAY EXAM CHEST 1 VIEW: CPT

## 2024-06-29 PROCEDURE — 94760 N-INVAS EAR/PLS OXIMETRY 1: CPT

## 2024-06-29 PROCEDURE — 2500000001 HC RX 250 WO HCPCS SELF ADMINISTERED DRUGS (ALT 637 FOR MEDICARE OP)

## 2024-06-29 ASSESSMENT — PAIN SCALES - GENERAL
PAINLEVEL_OUTOF10: 8
PAINLEVEL_OUTOF10: 10 - WORST POSSIBLE PAIN
PAINLEVEL_OUTOF10: 8
PAINLEVEL_OUTOF10: 8
PAINLEVEL_OUTOF10: 4
PAINLEVEL_OUTOF10: 8
PAINLEVEL_OUTOF10: 8
PAINLEVEL_OUTOF10: 6

## 2024-06-29 ASSESSMENT — PAIN - FUNCTIONAL ASSESSMENT
PAIN_FUNCTIONAL_ASSESSMENT: 0-10

## 2024-06-29 ASSESSMENT — COGNITIVE AND FUNCTIONAL STATUS - GENERAL
TOILETING: A LOT
HELP NEEDED FOR BATHING: A LOT
MOBILITY SCORE: 17
PERSONAL GROOMING: A LITTLE
CLIMB 3 TO 5 STEPS WITH RAILING: A LOT
WALKING IN HOSPITAL ROOM: A LITTLE
MOVING FROM LYING ON BACK TO SITTING ON SIDE OF FLAT BED WITH BEDRAILS: A LITTLE
STANDING UP FROM CHAIR USING ARMS: A LITTLE
TURNING FROM BACK TO SIDE WHILE IN FLAT BAD: A LITTLE
MOVING FROM LYING ON BACK TO SITTING ON SIDE OF FLAT BED WITH BEDRAILS: A LITTLE
MOBILITY SCORE: 17
DRESSING REGULAR UPPER BODY CLOTHING: A LITTLE
DRESSING REGULAR LOWER BODY CLOTHING: A LOT
STANDING UP FROM CHAIR USING ARMS: A LITTLE
DAILY ACTIVITIY SCORE: 15
MOVING TO AND FROM BED TO CHAIR: A LITTLE
MOVING TO AND FROM BED TO CHAIR: A LITTLE
WALKING IN HOSPITAL ROOM: A LITTLE
EATING MEALS: A LITTLE
CLIMB 3 TO 5 STEPS WITH RAILING: A LOT
TURNING FROM BACK TO SIDE WHILE IN FLAT BAD: A LITTLE

## 2024-06-29 ASSESSMENT — PAIN DESCRIPTION - ORIENTATION
ORIENTATION: LEFT

## 2024-06-29 ASSESSMENT — PAIN DESCRIPTION - LOCATION
LOCATION: CHEST

## 2024-06-29 ASSESSMENT — PAIN SCALES - PAIN ASSESSMENT IN ADVANCED DEMENTIA (PAINAD): TOTALSCORE: MEDICATION (SEE MAR)

## 2024-06-29 NOTE — PROGRESS NOTES
"Natalya Lira is a 89 y.o. female on day 3 of admission presenting with Acute hypoxic respiratory failure (Multi).    Subjective   States her breathing feels better. AM chest x-ray with persistent basilar PTX.        Objective   GEN: no respiratory difficulties  ENT: wearing O2 nasal cannula  CV: RRR, no m/g/r  LUNGS: good effort, clear bilaterally, no w/r/r. No air leak noted in Pleurevac      Last Recorded Vitals  Blood pressure 154/60, pulse 71, temperature 36.3 °C (97.3 °F), temperature source Temporal, resp. rate 19, height 1.499 m (4' 11.02\"), weight 55.7 kg (122 lb 12.7 oz), SpO2 100%.  Intake/Output last 3 Shifts:  I/O last 3 completed shifts:  In: 1430 (24.6 mL/kg) [P.O.:1180; IV Piggyback:250]  Out: 0 (0 mL/kg)   Dosing Weight: 58.2 kg     Relevant Results  Scheduled medications  amoxicillin-pot clavulanate, 1 tablet, oral, BID  aspirin, 81 mg, oral, Daily  atorvastatin, 10 mg, oral, Daily  calcium carbonate-vitamin D3, 1 tablet, oral, BID  clopidogrel, 75 mg, oral, Daily  cyanocobalamin, 1,000 mcg, oral, Daily  enoxaparin, 30 mg, subcutaneous, q24h  folic acid, 1 mg, oral, Daily  furosemide, 20 mg, oral, Daily  insulin glargine, 10 Units, subcutaneous, Nightly  insulin lispro, 0-10 Units, subcutaneous, TID  ipratropium-albuteroL, 3 mL, nebulization, TID  levothyroxine, 88 mcg, oral, Daily  losartan, 100 mg, oral, Daily  mirtazapine, 7.5 mg, oral, Nightly  oxygen, , inhalation, Continuous - Inhalation  pantoprazole, 20 mg, oral, Daily before breakfast  potassium chloride CR, 20 mEq, oral, Daily  sennosides-docusate sodium, 1 tablet, oral, BID      Continuous medications     PRN medications  PRN medications: acetaminophen, dextrose, dextrose, glucagon, glucagon, HYDROmorphone, ipratropium-albuteroL, ondansetron ODT **OR** ondansetron, oxyCODONE, oxyCODONE, oxygen    Results for orders placed or performed during the hospital encounter of 06/26/24 (from the past 24 hour(s))   Transthoracic Echo (TTE) Complete "   Result Value Ref Range    AV pk chanelle 1.19 m/s    AV mn grad 3.0 mmHg    LVOT diam 2.00 cm    MV E/A ratio 0.44     LA vol index A/L 29.7 ml/m2    Tricuspid annular plane systolic excursion 2.2 cm    LV EF 50 %    RV free wall pk S' 8.43 cm/s    LVIDd 3.76 cm    Aortic Valve Area by Continuity of VTI 2.05 cm2    Aortic Valve Area by Continuity of Peak Velocity 2.30 cm2    AV pk grad 5.7 mmHg    LV A4C EF 32.8    POCT GLUCOSE   Result Value Ref Range    POCT Glucose 170 (H) 74 - 99 mg/dL   POCT GLUCOSE   Result Value Ref Range    POCT Glucose 192 (H) 74 - 99 mg/dL   POCT GLUCOSE   Result Value Ref Range    POCT Glucose 298 (H) 74 - 99 mg/dL   Renal function panel   Result Value Ref Range    Glucose 194 (H) 74 - 99 mg/dL    Sodium 133 (L) 136 - 145 mmol/L    Potassium 4.4 3.5 - 5.3 mmol/L    Chloride 99 98 - 107 mmol/L    Bicarbonate 24 21 - 32 mmol/L    Anion Gap 14 10 - 20 mmol/L    Urea Nitrogen 17 6 - 23 mg/dL    Creatinine 1.36 (H) 0.50 - 1.05 mg/dL    eGFR 37 (L) >60 mL/min/1.73m*2    Calcium 8.5 (L) 8.6 - 10.3 mg/dL    Phosphorus 4.3 2.5 - 4.9 mg/dL    Albumin 3.0 (L) 3.4 - 5.0 g/dL   Magnesium   Result Value Ref Range    Magnesium 2.11 1.60 - 2.40 mg/dL   CBC   Result Value Ref Range    WBC 9.1 4.4 - 11.3 x10*3/uL    nRBC 0.0 0.0 - 0.0 /100 WBCs    RBC 3.74 (L) 4.00 - 5.20 x10*6/uL    Hemoglobin 12.7 12.0 - 16.0 g/dL    Hematocrit 39.5 36.0 - 46.0 %     (H) 80 - 100 fL    MCH 34.0 26.0 - 34.0 pg    MCHC 32.2 32.0 - 36.0 g/dL    RDW 15.5 (H) 11.5 - 14.5 %    Platelets 152 150 - 450 x10*3/uL   POCT GLUCOSE   Result Value Ref Range    POCT Glucose 195 (H) 74 - 99 mg/dL       IMAGING:  CXR 6/29/2024: 1. Stable chest.  2. Pigtail catheter looped in the left upper hemithorax.  3. Unchanged small left basilar pneumothorax, likely an ex vacuo  pneumothorax from left thoracentesis.  4. There is mild left basilar atelectasis.    CXR 6/28/2024:  1.  Satisfactory placement of pleural drainage catheter on the  left  with partial reduction in the degree of left pneumothorax.    CXR 6/28/2024:  1.  Large pneumothorax on the left following thoracentesis. It is  possible there may be a component of ex vacuo change or trapped lung.                    Assessment/Plan   Active Problems:    Postprocedural pneumothorax    Summary:  89 y.o. female admitted on 6/26/2024  3:39 AM presenting with shortness of breath found to have a large left pleural effusion.  Pleural effusion was first noticed in September 2023 and at that time was small.  There were also no apparent left-sided nodules or masses at that time.  She is feeling better after 1.3L thoracentesis on 6/26/2024, however, post-procedure chest x-ray demonstrating a left lung mass. This was followed up with CT chest which re-classified the mass as a consolidation. Pleural fluid is now consistent with a complicated parapneumonic effusion. She underwent second thoracentesis on 6/28/2024 complicated by post-procedural pneumothorax. PTX was both basilar and apical which is suggestive of possible PTX ex vacuo. She then had pigtail catheter insertion with resolution of apical PTX, but persistence of basilar PTX which again, is suggestive of PTX ex vacuo and lung entrapment.    Had discussion with patient and granddaughter at bedside yesterday explaining lung entrapment and possible treatment options including VATS/decortication vs observation.     Recommendations:  -Chest tube clamped this AM at 08:30. Will repeat CXR around 11AM.   -Will potentially need CT chest non-contrast pending repeat CXR  -Continue supplemental O2 to help with lung-reexpansion  -Follow up results of original pleural fluid cytology.  -Recommend follow up CT chest in 2 months as outpatient     Taylor Davis DO  Pulmonary & Critical Care  6/29/2024 8:57 AM      Addendum 6/29/2024 12:30 PM  Reviewed post clamping CXR. Apical PTX appears to have worsened. Went to unclamp chest tube and restart suction. No air  bubbles noted. Next step will be CT chest without contrast.

## 2024-06-29 NOTE — PROGRESS NOTES
Natalya Lira is a 89 y.o. female on day 3 of admission presenting with Acute hypoxic respiratory failure (Multi).      Subjective       Natalya is sitting up in bed, complaining of left chest pain at chest tube insertion site. Chest tube is clamped. She does report her shortness of breath has improved.     Review of systems:  Constitutional: negative for fever, chills, or malaise  Neuro: negative for dizziness, headache, numbness, tingling  ENT: Negative for nasal congestion or sore throat  Resp: negative for shortness of breath, cough, or wheezing  CV: negative for chest pain, palpitations  GI: negative for abd pain, nausea, vomiting or diarrhea  : negative for dysuria, frequency, or urgency  Skin: negative for lesions, wounds, or rash  Musculoskeletal: Negative for weakness, myalgia, or arthralgia  Endocrine: Negative for polyuria or polydipsia         Objective   Constitutional: Well developed, awake/alert/oriented x3, no distress, alert and cooperative  Eyes: PERRL, EOMI, clear sclera  ENMT: mucous membranes moist, no apparent injury, no lesions seen  Head/Neck: Neck supple, no apparent injury, thyroid without mass or tenderness, No JVD, trachea midline, no bruits  Respiratory/Thorax: Patent airways, CTAB, normal breath sounds with good chest expansion, thorax symmetric, chest tube to left chest clean dry  Cardiovascular: Regular, rate and rhythm, no murmurs, 2+ equal pulses of the extremities, normal S 1and S 2  Gastrointestinal: Nondistended, soft, non-tender, no rebound tenderness or guarding, no masses palpable, no organomegaly, +BS, no bruits  Musculoskeletal: ROM intact, no joint swelling, normal strength  Extremities: normal extremities, no cyanosis edema, contusions or wounds, no clubbing  Neurological: alert and oriented x3, intact senses, motor, response and reflexes, normal strength  Lymphatic: No significant lymphadenopathy  Psychological: Appropriate mood and behavior  Skin: Warm and dry, no  "lesions, no rashes      Last Recorded Vitals  /60   Pulse 71   Temp 36.7 °C (98.1 °F)   Resp 19   Ht 1.499 m (4' 11.02\")   Wt 55.7 kg (122 lb 12.7 oz)   SpO2 100%   BMI 24.79 kg/m²     Intake/Output last 3 Shifts:  I/O last 3 completed shifts:  In: 1430 (24.6 mL/kg) [P.O.:1180; IV Piggyback:250]  Out: 0 (0 mL/kg)   Dosing Weight: 58.2 kg   I/O this shift:  In: 360 [P.O.:360]  Out: -     Relevant Results  Scheduled medications  amoxicillin-pot clavulanate, 1 tablet, oral, BID  aspirin, 81 mg, oral, Daily  atorvastatin, 10 mg, oral, Daily  calcium carbonate-vitamin D3, 1 tablet, oral, BID  clopidogrel, 75 mg, oral, Daily  cyanocobalamin, 1,000 mcg, oral, Daily  enoxaparin, 30 mg, subcutaneous, q24h  folic acid, 1 mg, oral, Daily  furosemide, 20 mg, oral, Daily  insulin glargine, 10 Units, subcutaneous, Nightly  insulin lispro, 0-10 Units, subcutaneous, TID  ipratropium-albuteroL, 3 mL, nebulization, TID  levothyroxine, 88 mcg, oral, Daily  [Held by provider] losartan, 100 mg, oral, Daily  mirtazapine, 7.5 mg, oral, Nightly  oxygen, , inhalation, Continuous - Inhalation  pantoprazole, 20 mg, oral, Daily before breakfast  potassium chloride CR, 20 mEq, oral, Daily  sennosides-docusate sodium, 1 tablet, oral, BID      Continuous medications     PRN medications  PRN medications: acetaminophen, dextrose, dextrose, glucagon, glucagon, HYDROmorphone, ipratropium-albuteroL, ondansetron ODT **OR** ondansetron, oxyCODONE, oxyCODONE    Results for orders placed or performed during the hospital encounter of 06/26/24 (from the past 24 hour(s))   POCT GLUCOSE   Result Value Ref Range    POCT Glucose 192 (H) 74 - 99 mg/dL   POCT GLUCOSE   Result Value Ref Range    POCT Glucose 298 (H) 74 - 99 mg/dL   Renal function panel   Result Value Ref Range    Glucose 194 (H) 74 - 99 mg/dL    Sodium 133 (L) 136 - 145 mmol/L    Potassium 4.4 3.5 - 5.3 mmol/L    Chloride 99 98 - 107 mmol/L    Bicarbonate 24 21 - 32 mmol/L    Anion " Gap 14 10 - 20 mmol/L    Urea Nitrogen 17 6 - 23 mg/dL    Creatinine 1.36 (H) 0.50 - 1.05 mg/dL    eGFR 37 (L) >60 mL/min/1.73m*2    Calcium 8.5 (L) 8.6 - 10.3 mg/dL    Phosphorus 4.3 2.5 - 4.9 mg/dL    Albumin 3.0 (L) 3.4 - 5.0 g/dL   Magnesium   Result Value Ref Range    Magnesium 2.11 1.60 - 2.40 mg/dL   CBC   Result Value Ref Range    WBC 9.1 4.4 - 11.3 x10*3/uL    nRBC 0.0 0.0 - 0.0 /100 WBCs    RBC 3.74 (L) 4.00 - 5.20 x10*6/uL    Hemoglobin 12.7 12.0 - 16.0 g/dL    Hematocrit 39.5 36.0 - 46.0 %     (H) 80 - 100 fL    MCH 34.0 26.0 - 34.0 pg    MCHC 32.2 32.0 - 36.0 g/dL    RDW 15.5 (H) 11.5 - 14.5 %    Platelets 152 150 - 450 x10*3/uL   POCT GLUCOSE   Result Value Ref Range    POCT Glucose 195 (H) 74 - 99 mg/dL   POCT GLUCOSE   Result Value Ref Range    POCT Glucose 291 (H) 74 - 99 mg/dL       XR chest 1 view   Final Result   1. Stable chest.   2. Pigtail catheter looped in the left upper hemithorax.   3. Unchanged small left basilar pneumothorax, likely an ex vacuo   pneumothorax from left thoracentesis.   4. There is mild left basilar atelectasis.        MACRO:   None        Signed by: Samantha Smith 6/29/2024 8:15 AM   Dictation workstation:   [id number]      XR chest 1 view   Final Result   1.  Satisfactory placement of pleural drainage catheter on the left   with partial reduction in the degree of left pneumothorax.                  MACRO:   None        Signed by: Javier Giraldo 6/28/2024 3:12 PM   Dictation workstation:   [id number]      XR chest 1 view   Final Result   1.  Large pneumothorax on the left following thoracentesis. It is   possible there may be a component of ex vacuo change or trapped lung.                  MACRO:   None        Signed by: Javier Giraldo 6/28/2024 1:43 PM   Dictation workstation:   [id number]      Transthoracic Echo (TTE) Complete   Final Result      CT chest w IV contrast   Final Result   1.  Consolidative airspace opacity in the left upper lobe with few    scattered surrounding ground-glass opacities is most likely favored   to represent pneumonia. This likely corresponds to the rounded   airspace opacity seen on immediate prior radiograph. However   recommend a short interval follow-up radiograph in 3-6 weeks to   document resolution and to exclude an underlying malignancy.   2. Moderate volume left-sided pleural effusion.   3. Mild circumferential thickening of the distal thoracic esophagus.   Recommend correlation with reflux.   4. Other stable chronic findings as described above.        MACRO:   Critical Finding:  See findings. Notification was initiated on   6/27/2024 at 1:16 pm by Dr. Ning Hansen.  (**-YCF-**)   Instructions:        Signed by: Ning Hansen 6/27/2024 1:16 PM   Dictation workstation:   [id number]      XR chest 1 view   Final Result   1. Interval decrease in left sided pleural effusion status post   thoracentesis with mild residual. Ill-defined rounded airspace   opacity in the left mid lung zone could be related to layering   pleural effusion, however underlying mass lesion versus pneumonia can   also be considered in the differential. Recommend short interval   follow-up radiograph versus a CT chest in 3-6 weeks to document   resolution.   2. Age-indeterminate transverse fracture through left humeral neck,   similar compared to prior imaging.                  MACRO:   Critical Finding:  See findings. Notification was initiated on   6/27/2024 at 10:16 am by Dr. Ning Hansen.  (**-YCF-**)   Instructions:        Signed by: Ning Hansen 6/27/2024 10:16 AM   Dictation workstation:   [id number]      US thoracentesis   Final Result   Uneventful  left-sided thoracentesis, as detailed above   .        Signed by: Albin Urban 6/26/2024 3:53 PM   Dictation workstation:   [id number]      XR chest 1 view    (Results Pending)       Transthoracic Echo (TTE) Complete    Result Date: 6/28/2024   [facility], [address] [address],  [place] [id number]               Tel [phone] and Fax [phone] TRANSTHORACIC ECHOCARDIOGRAM REPORT  Patient Name:      NAFISA TA STEPAN        Reading Physician:    [id number] Nathaniel Quintanilla MD Study Date:        6/28/2024            Ordering Provider:    [id number] DARIANA GLOVER MRN/PID:           [id number]             Fellow: Accession#:        [id number]         Nurse:                Aracelis Pretty RN Date of Birth/Age: 1935 / 89 years  Sonographer:          Kierra Robles MINGO Gender:            F                    Additional Staff: Height:            149.86 cm            Admit Date:           6/26/2024 Weight:            57.15 kg             Admission Status:     Inpatient -                                                               Routine BSA / BMI:         1.52 m2 / 25.45      Encounter#:           [id number]                    kg/m2 Blood Pressure:    150/82 mmHg          Department Location:  [facility] Non                                                               Invasive Study Type:    TRANSTHORACIC ECHO (TTE) COMPLETE Diagnosis/ICD: Heart failure, unspecified-I50.9 Indication:    Congestive Heart Failure, CAD CPT Code:      Echo Complete w Full Doppler-[id number] Patient History: Diabetes:          Yes Pertinent History: HTN, Hyperlipidemia, CAD, Anticoagulation, Previous DVT and                    Dyspnea. Cardiac stents, S/P TAVR 26mm Evolut Pro+ 6/29/2021. Study Detail: The following Echo studies were performed: 2D, M-Mode, Doppler and               color flow. Technically challenging study due to poor acoustic               windows. Definity used as a contrast agent for endocardial border               definition. Total contrast used for this procedure was 2.0 mL via               IV push. The patient was awake.  PHYSICIAN INTERPRETATION: Left Ventricle: The left  ventricular systolic function is mildly decreased, with a visually estimated ejection fraction of 50%. There are no regional wall motion abnormalities. The left ventricular cavity size is normal. Spectral Doppler shows an impaired relaxation pattern of left ventricular diastolic filling. Left Atrium: The left atrium is mildly dilated. Right Ventricle: The right ventricle is normal in size. There is normal right ventricular global systolic function. Right Atrium: The right atrium is normal in size. Aortic Valve: There is a prosthetic aortic valve present. The aortic valve dimensionless index is 0.65. There is bioprosthetic aortic valve. Echo findings are consistent with normal aortic valve prosthesis structure and function. There is no evidence of aortic valve regurgitation. The peak instantaneous gradient of the aortic valve is 5.7 mmHg. The mean gradient of the aortic valve is 3.0 mmHg. Mitral Valve: The mitral valve is normal in structure. There is moderate to severe mitral annular calcification. There is no evidence of mitral valve regurgitation. Tricuspid Valve: The tricuspid valve is structurally normal. There is trace tricuspid regurgitation. Pulmonic Valve: The pulmonic valve is structurally normal. There is no indication of pulmonic valve regurgitation. Pericardium: There is no pericardial effusion noted. Pleural: There is a moderate left pleural effusion. Aorta: The aortic root is normal. Pulmonary Veins: The pulmonary veins appear normal and return normally to the left atrium. Systemic Veins: The inferior vena cava appears to be of normal size.  CONCLUSIONS:  1. The left ventricular systolic function is mildly decreased, with a visually estimated ejection fraction of 50%.  2. Spectral Doppler shows an impaired relaxation pattern of left ventricular diastolic filling.  3. There is normal right ventricular global systolic function.  4. The left atrium is mildly dilated.  5. There is a moderate left pleural  effusion.  6. There is moderate to severe mitral annular calcification.  7. There is a bioprosthetic aortic valve which exhibits normal function. . QUANTITATIVE DATA SUMMARY: 2D MEASUREMENTS:                          Normal Ranges: Ao Root d:     2.90 cm   (2.0-3.7cm) IVSd:          0.82 cm   (0.6-1.1cm) LVPWd:         0.86 cm   (0.6-1.1cm) LVIDd:         3.76 cm   (3.9-5.9cm) LVIDs:         3.23 cm LV Mass Index: 59.8 g/m2 LV % FS        14.1 % LA VOLUME:                               Normal Ranges: LA Vol A4C:        39.8 ml    (22+/-6mL/m2) LA Vol A2C:        46.9 ml LA Vol BP:         45.0 ml LA Vol Index A4C:  26.3ml/m2 LA Vol Index A2C:  30.9 ml/m2 LA Vol Index BP:   29.7 ml/m2 LA Area A4C:       16.2 cm2 LA Area A2C:       18.3 cm2 LA Major Axis A4C: 5.6 cm LA Major Axis A2C: 6.1 cm LA Volume Index:   28.6 ml/m2 LA Vol A4C:        38.4 ml LA Vol A2C:        45.9 ml RA VOLUME BY A/L METHOD:                       Normal Ranges: RA Area A4C: 11.1 cm2 M-MODE MEASUREMENTS:                  Normal Ranges: Ao Root: 2.70 cm (2.0-3.7cm) LAs:     4.20 cm (2.7-4.0cm) AORTA MEASUREMENTS:                      Normal Ranges: Ao Sinus, d: 2.90 cm (2.1-3.5cm) LV SYSTOLIC FUNCTION BY 2D PLANIMETRY (MOD):                      Normal Ranges: EF-A4C View:    33 % (>=55%) EF-A2C View:    33 % EF-Biplane:     32 % EF-Visual:      50 % LV EF Reported: 50 % LV DIASTOLIC FUNCTION:                               Normal Ranges: MV Peak E:        0.82 m/s    (0.7-1.2 m/s) MV Peak A:        1.85 m/s    (0.42-0.7 m/s) E/A Ratio:        0.44        (1.0-2.2) MV e'             0.030 m/s   (>8.0) MV lateral e'     0.03 m/s MV medial e'      0.03 m/s MV A Dur:         158.00 msec E/e' Ratio:       27.33       (<8.0) PulmV Sys Lionel:    23.70 cm/s PulmV Skinner Lionel:   30.80 cm/s PulmV S/D Lionel:    0.80 PulmV A Revs Lionel: 33.90 cm/s PulmV A Revs Dur: 127.00 msec MITRAL VALVE:                       Normal Ranges: MV Vmax:    1.81 m/s  (<=1.3m/s) MV peak  P.0 mmHg (<5mmHg) MV mean P.0 mmHg  (<2mmHg) MV DT:      325 msec  (150-240msec) AORTIC VALVE:                                   Normal Ranges: AoV Vmax:                1.19 m/s (<=1.7m/s) AoV Peak P.7 mmHg (<20mmHg) AoV Mean PG:             3.0 mmHg (1.7-11.5mmHg) LVOT Max Lionel:            0.87 m/s (<=1.1m/s) AoV VTI:                 20.10 cm (18-25cm) LVOT VTI:                13.10 cm LVOT Diameter:           2.00 cm  (1.8-2.4cm) AoV Area, VTI:           2.05 cm2 (2.5-5.5cm2) AoV Area,Vmax:           2.30 cm2 (2.5-4.5cm2) AoV Dimensionless Index: 0.65  RIGHT VENTRICLE: RV Basal 2.27 cm RV Mid   1.85 cm RV Major 6.4 cm TAPSE:   22.3 mm RV s'    0.08 m/s TRICUSPID VALVE/RVSP:                   Normal Ranges: IVC Diam: 1.68 cm PULMONIC VALVE:                      Normal Ranges: PV Max Lionel: 0.5 m/s  (0.6-0.9m/s) PV Max P.2 mmHg Pulmonary Veins: PulmV A Revs Dur: 127.00 msec PulmV A Revs Lionel: 33.90 cm/s PulmV Skinner Lionel:   30.80 cm/s PulmV S/D Lionel:    0.80 PulmV Sys Lionel:    23.70 cm/s  [id number] Nathaniel Quintanilla MD Electronically signed on 2024 at 2:36:01 PM  ** Final **           Assessment/Plan   Active Problems:    Postprocedural pneumothorax    Echocardiogram from 2024, LVEF 50%, left atrial enlargement, normal functioning TAVR, severe mitral annular calcification with mild mitral stenosis, moderate MR, mild TR, moderate LVH and abnormal LV relaxation, mild pulmonary hypertension     Acute on chronic diastolic CHF  -  -CXR showed large left pleural effusion  -I reviewed the Echocardiogram as above  -Strict I & Os  -Daily weights  -2gm na diet  -1500mL fluid restriction  -Cont lasix  -s/p thoracentesis 1.3L removed and then additional 900mL removed today  - Chest tube left chest clamped     2. 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 Pneumonia  -CXR showed large pleural effusion, possible pneumonia  -Procal pending  -antibiotics  -bronchodilators  -oxygen support  -sputum culture  -blood cultures  -CT chest post thoracentesis showed consolidation  -Per pulmonary, pleural fluid consistent with complicated parapneumonic effusion  - Chest tube left chest, clamped     4. Hypertension  -stable  -2gm na diet  -cont meds  -monitor     5. UTI  -Urine culture positive for proteus mirabilis  -Antibiotics         Diana Masterson, LORNE-CNP

## 2024-06-29 NOTE — PROGRESS NOTES
Internal Medicine - Daily Progress Note   Hospital Day: 4       Name:Natalya Lira, AGE: 89 y.o., GENDER: female, MRN: [id number], ROOM: 252/252-A   CODE STATUS: DNR and No Intubation  Attending Physician: Ray Frank DO  Resident: Lorena Poole DO        Chief Complaint   No chief complaint on file.       Subjective    Natalya Lira is a 89 y.o. year old female patient on Hospital Day: 4 presenting with Acute hypoxic respiratory failure (Multi).    Overnight events:  - No acute events overnight.     Subjective:  - Patient seen and examined, feeling well with no complaints     Meds    Scheduled medications  amoxicillin-pot clavulanate, 1 tablet, oral, BID  aspirin, 81 mg, oral, Daily  atorvastatin, 10 mg, oral, Daily  calcium carbonate-vitamin D3, 1 tablet, oral, BID  clopidogrel, 75 mg, oral, Daily  cyanocobalamin, 1,000 mcg, oral, Daily  enoxaparin, 30 mg, subcutaneous, q24h  folic acid, 1 mg, oral, Daily  furosemide, 20 mg, oral, Daily  insulin glargine, 10 Units, subcutaneous, Nightly  insulin lispro, 0-10 Units, subcutaneous, TID  ipratropium-albuteroL, 3 mL, nebulization, TID  levothyroxine, 88 mcg, oral, Daily  [Held by provider] losartan, 100 mg, oral, Daily  mirtazapine, 7.5 mg, oral, Nightly  oxygen, , inhalation, Continuous - Inhalation  pantoprazole, 20 mg, oral, Daily before breakfast  potassium chloride CR, 20 mEq, oral, Daily  sennosides-docusate sodium, 1 tablet, oral, BID      Continuous medications     PRN medications  PRN medications: acetaminophen, dextrose, dextrose, glucagon, glucagon, HYDROmorphone, ipratropium-albuteroL, ondansetron ODT **OR** ondansetron, oxyCODONE, oxyCODONE     Objective    Physical Exam  Constitutional:       Appearance: Normal appearance.   HENT:      Head: Normocephalic and atraumatic.      Mouth/Throat:      Mouth: Mucous membranes are moist.   Cardiovascular:      Rate and Rhythm: Normal rate and regular rhythm.      Heart sounds: No murmur heard.     No friction  "rub. No gallop.   Pulmonary:      Effort: Pulmonary effort is normal.      Breath sounds: Normal breath sounds.   Abdominal:      General: Abdomen is flat. Bowel sounds are normal.      Palpations: Abdomen is soft. There is no mass.      Tenderness: There is no abdominal tenderness. There is no guarding.   Musculoskeletal:         General: No tenderness. Normal range of motion.      Cervical back: Normal range of motion.      Right lower leg: Edema present.      Left lower leg: Edema present.   Neurological:      Mental Status: She is alert and oriented to person, place, and time.      Comments: But confused      Visit Vitals  /60   Pulse 71   Temp 36.7 °C (98.1 °F)   Resp 19   Ht 1.499 m (4' 11.02\")   Wt 55.7 kg (122 lb 12.7 oz)   SpO2 100%   BMI 24.79 kg/m²   Smoking Status Former   BSA 1.52 m²        Intake/Output Summary (Last 24 hours) at 6/29/2024 1229  Last data filed at 6/29/2024 0930  Gross per 24 hour   Intake 700 ml   Output 0 ml   Net 700 ml       Labs:   Results from last 72 hours   Lab Units 06/29/24  0521 06/28/24  0806 06/27/24  0531   SODIUM mmol/L 133* 136 139   POTASSIUM mmol/L 4.4 4.2 3.6   CHLORIDE mmol/L 99 100 100   CO2 mmol/L 24 28 30   BUN mg/dL 17 17 17   CREATININE mg/dL 1.36* 1.17* 1.19*   GLUCOSE mg/dL 194* 137* 148*   CALCIUM mg/dL 8.5* 8.8 8.7   ANION GAP mmol/L 14 12 13   EGFR mL/min/1.73m*2 37* 45* 44*   PHOSPHORUS mg/dL 4.3 4.3 3.4      Results from last 72 hours   Lab Units 06/29/24  0522 06/28/24  0806 06/27/24  0531   WBC AUTO x10*3/uL 9.1 9.5 8.3   HEMOGLOBIN g/dL 12.7 13.9 13.5   HEMATOCRIT % 39.5 42.6 41.3   PLATELETS AUTO x10*3/uL 152 138* 139*      Lab Results   Component Value Date    CALCIUM 8.5 (L) 06/29/2024    PHOS 4.3 06/29/2024      Lab Results   Component Value Date    CRP 1.59 (H) 06/26/2024       Micro/ID:   Susceptibility data from last 90 days.  Collected Specimen Info Organism Ampicillin Cefazolin Cefazolin (uncomplicated UTIs only) Ciprofloxacin " "Gentamicin Nitrofurantoin Piperacillin/Tazobactam Tetracycline Trimethoprim/Sulfamethoxazole   06/25/24 Urine from Clean Catch/Voided Proteus mirabilis  S  S  S  S  S  R  S  R  S                    No lab exists for component: \"AGALPCRNB\"     Lab Results   Component Value Date    URINECULTURE 20,000 - 80,000 Proteus mirabilis (A) 06/25/2024    BLOODCULT No growth at 2 days 06/26/2024    BLOODCULT No growth at 2 days 06/26/2024       Images    ECG 12 lead  Result Date: 6/27/2024  Sinus rhythm with occasional Premature ventricular complexes Left axis deviation Right bundle branch block Possible Lateral infarct (cited on or before 25-JUN-2024) Inferior infarct , age undetermined Abnormal ECG When compared with ECG of 09-SEP-2023 21:22, Previous ECG has undetermined rhythm, needs review Left anterior fascicular block is no longer Present Inferior infarct is now Present Questionable change in initial forces of Lateral leads    CT chest w IV contrast  Result Date: 6/27/2024  1.  Consolidative airspace opacity in the left upper lobe with few scattered surrounding ground-glass opacities is most likely favored to represent pneumonia. This likely corresponds to the rounded airspace opacity seen on immediate prior radiograph. However recommend a short interval follow-up radiograph in 3-6 weeks to document resolution and to exclude an underlying malignancy.   2. Moderate volume left-sided pleural effusion.   3. Mild circumferential thickening of the distal thoracic esophagus. Recommend correlation with reflux.   4. Other stable chronic findings as described above.   MACRO: Critical Finding:  See findings. Notification was initiated on 6/27/2024 at 1:16 pm by Dr. Ning Hansen.  (**-YCF-**) Instructions:   Signed by: Ning Hansen 6/27/2024 1:16 PM Dictation workstation:   [id number]    XR chest 1 view  Result Date: 6/27/2024  1. Interval decrease in left sided pleural effusion status post thoracentesis with mild residual. " Ill-defined rounded airspace opacity in the left mid lung zone could be related to layering pleural effusion, however underlying mass lesion versus pneumonia can also be considered in the differential. Recommend short interval follow-up radiograph versus a CT chest in 3-6 weeks to document resolution. 2. Age-indeterminate transverse fracture through left humeral neck, similar compared to prior imaging.       MACRO: Critical Finding:  See findings. Notification was initiated on 6/27/2024 at 10:16 am by Dr. Ning Hansen.  (**-YCF-**) Instructions:   Signed by: Ning Hansen 6/27/2024 10:16 AM Dictation workstation:   [id number]    US thoracentesis  Result Date: 6/26/2024  Uneventful  left-sided thoracentesis, as detailed above .   Signed by: Albin Urban 6/26/2024 3:53 PM Dictation workstation:   [id number]    XR chest 1 view  Result Date: 6/25/2024  1.  Large left pleural effusion with dense left basilar airspace disease. Underlying pneumonia is in the differential. 2. Hyperinflated lungs.       MACRO: None   Signed by: Beny Root 6/25/2024 5:09 PM Dictation workstation:   [id number]      Assessment and Plan    Natalya Lira is a 89 y.o. female admitted on 6/26/2024 who presents due to shortness of breath, found with acute hypoxic respiratory failure due to pleural effusion and suspected pneumonia. Has PMHx significant for T2DM, HTN, HLD, HFpEF 50% ef 7/2021, GERD, hypothyroidism, rheumatoid arthritis on methotrexate, CAD s/p PCI to RCA and LAD, hx aortic stenosis s/p TAVR 2021, hx DVT was on Eliquis, CKD3, hx mild cognitive impairment, macular degeneration of eyes, hx dvt (previously on eliquis).  Pulmonology on board.  Currently patient status post thoracentesis on 06/26/2024 with 1300 cc fluid removal    ACUTE MEDICAL ISSUES:  # Acute Pneumothorax  - CT chest done yesterday showed Moderate volume left-sided pleural effusion  - Pulmonology on board, pt. S/p Lt sided thoracentesis and 900 ml fluid  was removed. Complicated by post procedure pneumothorax.  - Chest tube placed 6/28, clamped this AM 6/29  - Repeat CXR today 6/29 with pneumo unchanged in size   - Will need follow up CT chest in 2 months as outpatient     # Acute hypoxic respiratory failure  # Large Left pleural effusion - Improved after thoracentesis   # Suspected community acquired pneumonia   - Sx: Nonresolving shortness of breath  - Labs: No leukocytosis, elevated BNP at 776, elevated CRP at 1.59.  Troponins 79 --> 73, Procal 0.04.  Strep pneumo, Legionella negative. Blood cultures drawn on 06/26 NG1D  - Imaging:  CXR Large left pleural effusion with dense left basilar airspace disease. Underlying pneumonia is in the differential.   - Echo from 08/02/2021: LVEF 50%, mildly dilated LA, moderate LVH, normal appearance TAVR, mild periprosthetic aortic regurgitstion, moderate MR, mild to moderate TR, moderate pulm HTN  - Abx: Augmentin 875 - 125 mg BID for days (06/27/2024 - 007/04/2024) and Azithro 500 mg every day  (06/26/2024 - 06/28/2024)   - Tx: Lasix 20 mg every day.  On potassium 20 mEq supplement for diuretic induced hypokalemia  [ ] Echo ordered; Pending  - Pulm consulted: Patient s/p IR guided thoracentesis done on 06/26/2024. CXR afterwards showed Interval decrease in left sided pleural effusion status post thoracentesis with mild residual  Ill-defined rounded airspace opacity in the left mid lung zone could be related to layering pleural effusion, however underlying mass lesion versus pneumonia can also be considered in the differential.   - CT chest showed onsolidative airspace opacity in the left upper lobe with few scattered surrounding ground-glass opacities is most likely favored to represent pneumonia. This likely corresponds to the rounded airspace opacity seen on immediate prior radiograph. However recommend a short interval follow-up radiograph in 3-6 weeks to document resolution and to exclude an underlying malignancy.   -  Resp Regimen: Duoneb, Acapella + IS  - Supportive: I/O, 1500 ml fluid restriction, 2 g Na restriction, supplemental oxygen to maintain O2 sats > 90%  - Follow up with PCP/ Pulm in 3-6 weeks with repeat CT     # Asymptomatic UTI:  - UC from 06/25/2024: 20,000 - 80,000 proteus mirabilis  - Abx: Currently being covered with Augmentin 875 - 125 mg BID for 7 days for another problem    CHRONIC MEDICAL ISSUES:  # CAD s/p stent - Continue home aspirin 81 mg every day and Plavix 75 mg every day        # hx DVT - more than 1 year ago, was on Eliquis and was advised to stop due to multiple falls.   # hx aortic stenosis s/p TAVR - Evolut Pro+ 26mm 6/29/21 non mechanical.   # T2DM - hold home Glimepiride 4 mg twice daily. SSI #2 (0 to 10 units 3 times daily)  # HTN - HOLD losartan 100 mg every day d/t elevated Cr            # hx rheumatoid arthritis - Hold methotrexate, continue folate 1 mg every day  # MDD / insomnia - On mirtazapine 7.5 nightly  # HLD - On atorvastatin 10 mg every day        # GERD - On protonix 20 mg every day           # Hypothyroidism - On levothyroxine 88 mcg every day          # Hypovitaminosis: On Vit B12 1000 mcg every day, folate 1 mg every day  # Constipation: On Roberta-Colace 8.6 - 50 mg 1 tab nightly    Fluids: None, 1500 ml fluid restriction  Electrolytes: Replete as needed    Nutrition: Cardiac + Diabetic   Antimicrobials: Ceftriaxone 1g Q24Hr  (6/26 - 06/30) and Azithro 500 mg every day (6/26 - 06/28)   DVT ppx:  SCD; Lovenox 30 mg subcutaneous every day   GI ppx: protonix 20 mg every day   Catheter: External female catheter  Lines: 20 G PIV in rt. antecubital  Supplemental Oxygen: On RA  Emergency Contact: Extended Emergency Contact Information  Primary Emergency Contact: Phyllis Lira  Home Phone: [phone]  Relation: Grandchild  Secondary Emergency Contact: Migel Lira  Address: [facility] [place] [address]  Home Phone: [phone]  Relation: None   Code: DNR and No  Intubation     Disposition:  Has no home going needs. PT/OT recommends low intensity. Will be discharged home once primary issue resolved.     Lorena Poole,   Internal Medicine, PGY-2  06/29/24 at 12:29 PM

## 2024-06-30 ENCOUNTER — APPOINTMENT (OUTPATIENT)
Dept: RADIOLOGY | Facility: HOSPITAL | Age: 89
End: 2024-06-30
Payer: MEDICARE

## 2024-06-30 VITALS
WEIGHT: 128.53 LBS | DIASTOLIC BLOOD PRESSURE: 52 MMHG | RESPIRATION RATE: 19 BRPM | SYSTOLIC BLOOD PRESSURE: 117 MMHG | TEMPERATURE: 97.2 F | HEART RATE: 78 BPM | OXYGEN SATURATION: 96 % | HEIGHT: 59 IN | BODY MASS INDEX: 25.91 KG/M2

## 2024-06-30 LAB
ALBUMIN SERPL BCP-MCNC: 3.1 G/DL (ref 3.4–5)
ANION GAP SERPL CALC-SCNC: 10 MMOL/L (ref 10–20)
BACTERIA BLD CULT: NORMAL
BACTERIA BLD CULT: NORMAL
BACTERIA FLD CULT: NORMAL
BUN SERPL-MCNC: 14 MG/DL (ref 6–23)
CALCIUM SERPL-MCNC: 8.8 MG/DL (ref 8.6–10.3)
CHLORIDE SERPL-SCNC: 98 MMOL/L (ref 98–107)
CO2 SERPL-SCNC: 32 MMOL/L (ref 21–32)
CREAT SERPL-MCNC: 1.11 MG/DL (ref 0.5–1.05)
EGFRCR SERPLBLD CKD-EPI 2021: 48 ML/MIN/1.73M*2
ERYTHROCYTE [DISTWIDTH] IN BLOOD BY AUTOMATED COUNT: 15.1 % (ref 11.5–14.5)
GLUCOSE BLD MANUAL STRIP-MCNC: 152 MG/DL (ref 74–99)
GLUCOSE BLD MANUAL STRIP-MCNC: 222 MG/DL (ref 74–99)
GLUCOSE BLD MANUAL STRIP-MCNC: 230 MG/DL (ref 74–99)
GLUCOSE BLD MANUAL STRIP-MCNC: 245 MG/DL (ref 74–99)
GLUCOSE SERPL-MCNC: 139 MG/DL (ref 74–99)
GRAM STN SPEC: NORMAL
GRAM STN SPEC: NORMAL
HCT VFR BLD AUTO: 41.5 % (ref 36–46)
HGB BLD-MCNC: 13.2 G/DL (ref 12–16)
MAGNESIUM SERPL-MCNC: 1.99 MG/DL (ref 1.6–2.4)
MCH RBC QN AUTO: 32.2 PG (ref 26–34)
MCHC RBC AUTO-ENTMCNC: 31.8 G/DL (ref 32–36)
MCV RBC AUTO: 101 FL (ref 80–100)
NRBC BLD-RTO: 0 /100 WBCS (ref 0–0)
PHOSPHATE SERPL-MCNC: 3.9 MG/DL (ref 2.5–4.9)
PLATELET # BLD AUTO: 189 X10*3/UL (ref 150–450)
POTASSIUM SERPL-SCNC: 4.5 MMOL/L (ref 3.5–5.3)
RBC # BLD AUTO: 4.1 X10*6/UL (ref 4–5.2)
SODIUM SERPL-SCNC: 135 MMOL/L (ref 136–145)
WBC # BLD AUTO: 8.2 X10*3/UL (ref 4.4–11.3)

## 2024-06-30 PROCEDURE — 2500000002 HC RX 250 W HCPCS SELF ADMINISTERED DRUGS (ALT 637 FOR MEDICARE OP, ALT 636 FOR OP/ED)

## 2024-06-30 PROCEDURE — 99233 SBSQ HOSP IP/OBS HIGH 50: CPT | Performed by: INTERNAL MEDICINE

## 2024-06-30 PROCEDURE — 94668 MNPJ CHEST WALL SBSQ: CPT

## 2024-06-30 PROCEDURE — 83735 ASSAY OF MAGNESIUM: CPT

## 2024-06-30 PROCEDURE — 2500000001 HC RX 250 WO HCPCS SELF ADMINISTERED DRUGS (ALT 637 FOR MEDICARE OP)

## 2024-06-30 PROCEDURE — 85027 COMPLETE CBC AUTOMATED: CPT

## 2024-06-30 PROCEDURE — 94760 N-INVAS EAR/PLS OXIMETRY 1: CPT

## 2024-06-30 PROCEDURE — 1200000002 HC GENERAL ROOM WITH TELEMETRY DAILY

## 2024-06-30 PROCEDURE — 94640 AIRWAY INHALATION TREATMENT: CPT

## 2024-06-30 PROCEDURE — 2500000001 HC RX 250 WO HCPCS SELF ADMINISTERED DRUGS (ALT 637 FOR MEDICARE OP): Performed by: NURSE PRACTITIONER

## 2024-06-30 PROCEDURE — 2500000004 HC RX 250 GENERAL PHARMACY W/ HCPCS (ALT 636 FOR OP/ED)

## 2024-06-30 PROCEDURE — 80069 RENAL FUNCTION PANEL: CPT

## 2024-06-30 PROCEDURE — 82947 ASSAY GLUCOSE BLOOD QUANT: CPT | Mod: 91

## 2024-06-30 PROCEDURE — 2500000002 HC RX 250 W HCPCS SELF ADMINISTERED DRUGS (ALT 637 FOR MEDICARE OP, ALT 636 FOR OP/ED): Performed by: INTERNAL MEDICINE

## 2024-06-30 PROCEDURE — 71250 CT THORAX DX C-: CPT

## 2024-06-30 PROCEDURE — 36415 COLL VENOUS BLD VENIPUNCTURE: CPT

## 2024-06-30 PROCEDURE — 2500000005 HC RX 250 GENERAL PHARMACY W/O HCPCS: Performed by: INTERNAL MEDICINE

## 2024-06-30 PROCEDURE — 99232 SBSQ HOSP IP/OBS MODERATE 35: CPT

## 2024-06-30 PROCEDURE — 71250 CT THORAX DX C-: CPT | Performed by: STUDENT IN AN ORGANIZED HEALTH CARE EDUCATION/TRAINING PROGRAM

## 2024-06-30 ASSESSMENT — COGNITIVE AND FUNCTIONAL STATUS - GENERAL
DRESSING REGULAR UPPER BODY CLOTHING: A LITTLE
MOBILITY SCORE: 15
TURNING FROM BACK TO SIDE WHILE IN FLAT BAD: A LITTLE
MOVING TO AND FROM BED TO CHAIR: A LITTLE
PERSONAL GROOMING: A LITTLE
CLIMB 3 TO 5 STEPS WITH RAILING: A LOT
EATING MEALS: A LITTLE
HELP NEEDED FOR BATHING: A LOT
DAILY ACTIVITIY SCORE: 15
TOILETING: A LOT
STANDING UP FROM CHAIR USING ARMS: A LOT
DRESSING REGULAR LOWER BODY CLOTHING: A LOT
WALKING IN HOSPITAL ROOM: A LOT
MOVING FROM LYING ON BACK TO SITTING ON SIDE OF FLAT BED WITH BEDRAILS: A LITTLE

## 2024-06-30 ASSESSMENT — PAIN SCALES - GENERAL
PAINLEVEL_OUTOF10: 5 - MODERATE PAIN
PAINLEVEL_OUTOF10: 0 - NO PAIN
PAINLEVEL_OUTOF10: 10 - WORST POSSIBLE PAIN
PAINLEVEL_OUTOF10: 9

## 2024-06-30 ASSESSMENT — PAIN DESCRIPTION - LOCATION
LOCATION: CHEST
LOCATION: CHEST

## 2024-06-30 ASSESSMENT — PAIN - FUNCTIONAL ASSESSMENT
PAIN_FUNCTIONAL_ASSESSMENT: 0-10

## 2024-06-30 ASSESSMENT — PAIN DESCRIPTION - ORIENTATION
ORIENTATION: LEFT;OUTER
ORIENTATION: LEFT

## 2024-06-30 NOTE — PROGRESS NOTES
"Natalya Lira is a 89 y.o. female on day 4 of admission presenting with Acute hypoxic respiratory failure (Multi).    Subjective   No breathing difficulties. Complaining of some soreness at chest tube insertion site. Currently on room air. CT chest ordered for this morning.       Objective   GEN: no respiratory difficulties, eating breakfast  CV: RRR, no m/g/r  LUNGS: effort limited by pain, clear on right, diminished on left  EXT: no edema, cyanosis, clubbing  No air leak noted in Pleurevac        Last Recorded Vitals  Blood pressure 141/68, pulse 78, temperature 37 °C (98.6 °F), temperature source Temporal, resp. rate 19, height 1.499 m (4' 11.02\"), weight 58.3 kg (128 lb 8.5 oz), SpO2 96%.  Intake/Output last 3 Shifts:  I/O last 3 completed shifts:  In: 820 (14.1 mL/kg) [P.O.:820]  Out: 0 (0 mL/kg)   Dosing Weight: 58.2 kg     Relevant Results  Scheduled medications  amoxicillin-pot clavulanate, 1 tablet, oral, BID  aspirin, 81 mg, oral, Daily  atorvastatin, 10 mg, oral, Daily  calcium carbonate-vitamin D3, 1 tablet, oral, BID  clopidogrel, 75 mg, oral, Daily  cyanocobalamin, 1,000 mcg, oral, Daily  enoxaparin, 30 mg, subcutaneous, q24h  folic acid, 1 mg, oral, Daily  furosemide, 20 mg, oral, Daily  insulin glargine, 10 Units, subcutaneous, Nightly  insulin lispro, 0-10 Units, subcutaneous, TID  ipratropium-albuteroL, 3 mL, nebulization, TID  levothyroxine, 88 mcg, oral, Daily  [Held by provider] losartan, 100 mg, oral, Daily  mirtazapine, 7.5 mg, oral, Nightly  oxygen, , inhalation, Continuous - Inhalation  pantoprazole, 20 mg, oral, Daily before breakfast  potassium chloride CR, 20 mEq, oral, Daily  sennosides-docusate sodium, 1 tablet, oral, BID      Continuous medications     PRN medications  PRN medications: acetaminophen, dextrose, dextrose, glucagon, glucagon, HYDROmorphone, ipratropium-albuteroL, ondansetron ODT **OR** ondansetron, oxyCODONE, oxyCODONE                  IMAGING:  CT chest " 6/30/2024:  Reviewed independently by me. Pigtail catheter in place in left thoracic cavity. Apical pneumothorax persists on left. Resolution of LILLY consolidative opacity previously seen on CT chest from 6/27/2024. Left basilar pneumothorax noted with accumulation of pleural fluid.           Assessment/Plan   Active Problems:    Postprocedural pneumothorax    Summary:  89 y.o. female admitted on 6/26/2024  3:39 AM presenting with shortness of breath found to have a large left pleural effusion.  Pleural effusion was first noticed in September 2023 and at that time was small.  There were also no apparent left-sided nodules or masses at that time.  She is feeling better after 1.3L thoracentesis on 6/26/2024, however, post-procedure chest x-ray demonstrating a left lung mass. This was followed up with CT chest which re-classified the mass as a consolidation. Pleural fluid is consistent with a complicated parapneumonic effusion. She underwent second thoracentesis on 6/28/2024 complicated by post-procedural pneumothorax. PTX was both basilar and apical which is suggestive of possible PTX ex vacuo. She then had pigtail catheter insertion with resolution of apical PTX, but persistence of basilar PTX which again, is suggestive of PTX ex vacuo and lung entrapment. No air leak.     CT chest performed today which demonstrates resolution of pneumonia, but persistent pneumothorax. Now with air-fluid level, and no air-leak, this confirms a pneumothorax ex-vacuo. I consulted Dr. King of thoracic surgery via transfer center and agreed that absence of air leak after continuous suction is a favorable sign, and that patient likely has lung entrapment. She recommended to perform a prolonged 24 hour clamping trial prior to chest tube removal, and would be happy to have patient follow up with her as an outpatient.    Recommendations:  -Chest tube clamped this AM at 11:30 after discussion with CT surgery. Will perform 24 hour clamping  trial with plans to remove chest tube tomorrow. Repeat CXR ordered for tomorrow at 11AM.   -Pneumonia appears to have cleared on CT chest from today  -Follow up results of original pleural fluid cytology.  -Recommend follow up CT chest in 2 months as outpatient and follow up with thoracic surgery.  -likely not a candidate for VATS/decortication for lung entrapment as she remains asymptomatic from breathing and oxygen standpoint.         Taylor Davis,   Pulmonary & Critical Care  6/30/2024 12:51 PM

## 2024-06-30 NOTE — PROGRESS NOTES
Subjective Data:  No chest pain or dyspnea    Overnight Events:    None     Objective Data:  Last Recorded Vitals:  Vitals:    06/30/24 0535 06/30/24 0615 06/30/24 1118 06/30/24 1600   BP:  141/68     BP Location:       Patient Position:       Pulse:       Resp:       Temp:   36.7 °C (98.1 °F) 36.9 °C (98.4 °F)   TempSrc:   Temporal Temporal   SpO2:       Weight: 58.3 kg (128 lb 8.5 oz)      Height:           Last Labs:  CBC - 6/30/2024:  5:27 AM  8.2 13.2 189    41.5      CMP - 6/30/2024:  5:27 AM  8.8 6.9 29 --- 1.1   3.9 3.1 17 78      PTT - 6/25/2024:  4:55 PM  1.1   12.4 34     TROPHS   Date/Time Value Ref Range Status   06/25/2024 05:50 PM 73 0 - 13 ng/L Final     Comment:     Previous result verified on 6/25/2024 1730 on specimen/case [id number] called with component [facility] for procedure Troponin I, High Sensitivity, Initial with value 79 ng/L.   06/25/2024 04:55 PM 79 0 - 13 ng/L Final   09/09/2023 09:27 PM 94 0 - 13 ng/L Final     Comment:     .  Less than 99th percentile of normal range cutoff-  Female and children under 18 years old <14 ng/L; Male <21 ng/L: Negative  Repeat testing should be performed if clinically indicated.   .  Female and children under 18 years old 14-50 ng/L; Male 21-50 ng/L:  Consistent with possible cardiac damage and possible increased clinical   risk. Serial measurements may help to assess extent of myocardial damage.   .  >50 ng/L: Consistent with cardiac damage, increased clinical risk and  myocardial infarction. Serial measurements may help assess extent of   myocardial damage.   .   NOTE: Children less than 1 year old may have higher baseline troponin   levels and results should be interpreted in conjunction with the overall   clinical context.   .  NOTE: Troponin I testing is performed using a different   testing methodology at [facility] than at other   [facility] hospitals. Direct result comparisons should only   be made within the same method.  This is a  critical result.    Per Laboratory policy, critical results for this test   only qualify to the call list once per 24 hours.      09/09/2023 07:18 PM 91 0 - 13 ng/L Final     Comment:     .  Less than 99th percentile of normal range cutoff-  Female and children under 18 years old <14 ng/L; Male <21 ng/L: Negative  Repeat testing should be performed if clinically indicated.   .  Female and children under 18 years old 14-50 ng/L; Male 21-50 ng/L:  Consistent with possible cardiac damage and possible increased clinical   risk. Serial measurements may help to assess extent of myocardial damage.   .  >50 ng/L: Consistent with cardiac damage, increased clinical risk and  myocardial infarction. Serial measurements may help assess extent of   myocardial damage.   .   NOTE: Children less than 1 year old may have higher baseline troponin   levels and results should be interpreted in conjunction with the overall   clinical context.   .  NOTE: Troponin I testing is performed using a different   testing methodology at [facility] than at other   [facility]. Direct result comparisons should only   be made within the same method.  This is a critical result.    Per Laboratory policy, critical results for this test   only qualify to the call list once per 24 hours.      09/09/2023 05:40  0 - 13 ng/L Final     Comment:     .  Less than 99th percentile of normal range cutoff-  Female and children under 18 years old <14 ng/L; Male <21 ng/L: Negative  Repeat testing should be performed if clinically indicated.   .  Female and children under 18 years old 14-50 ng/L; Male 21-50 ng/L:  Consistent with possible cardiac damage and possible increased clinical   risk. Serial measurements may help to assess extent of myocardial damage.   .  >50 ng/L: Consistent with cardiac damage, increased clinical risk and  myocardial infarction. Serial measurements may help assess extent of   myocardial damage.   .   NOTE: Children  less than 1 year old may have higher baseline troponin   levels and results should be interpreted in conjunction with the overall   clinical context.   .  NOTE: Troponin I testing is performed using a different   testing methodology at [facility] than at other   system hospitals. Direct result comparisons should only   be made within the same method.  [facility] CALLED RB TO KRANTHI HILL, 09/09/2023 18:36       BNP   Date/Time Value Ref Range Status   06/25/2024 04:55  0 - 99 pg/mL Final   07/02/2023 11:30  0 - 99 pg/mL Final     Comment:     .  <100 pg/mL - Heart failure unlikely  100-299 pg/mL - Intermediate probability of acute heart  .               failure exacerbation. Correlate with clinical  .               context and patient history.    >=300 pg/mL - Heart Failure likely. Correlate with clinical  .               context and patient history.  BNP testing is performed using different testing   methodology at [facility] than at other   system hospitals. Direct result comparisons should   only be made within the same method.     05/25/2021 07:00  0 - 99 pg/mL Final     Comment:     .  <100 pg/mL - Heart failure unlikely  100-299 pg/mL - Intermediate probability of acute heart  .               failure exacerbation. Correlate with clinical  .               context and patient history.    >=300 pg/mL - Heart Failure likely. Correlate with clinical  .               context and patient history.  BNP testing is performed using different testing   methodology at [facility] than at other   system hospitals. Direct result comparisons should   only be made within the same method.       HGBA1C   Date/Time Value Ref Range Status   03/21/2024 01:42 PM 8.6 4.3 - 5.6 % Final     Comment:     American Diabetes Association guidelines indicate that patients with HgbA1c in the range 5.7-6.4% are at increased risk for development of diabetes, and intervention by lifestyle  modification may be beneficial. HgbA1c greater or equal to 6.5% is considered diagnostic of diabetes.   10/26/2023 02:47 PM 7.1 4.3 - 5.6 % Final     Comment:     American Diabetes Association guidelines indicate that patients with HgbA1c in the range 5.7-6.4% are at increased risk for development of diabetes, and intervention by lifestyle modification may be beneficial. HgbA1c greater or equal to 6.5% is considered diagnostic of diabetes.     VLDL   Date/Time Value Ref Range Status   05/27/2021 06:39 PM 24 0 - 40 mg/dL Final   05/23/2021 01:24 AM 19 0 - 40 mg/dL Final   01/21/2021 10:15 AM 28 0 - 40 mg/dL Final      Last I/O:  I/O last 3 completed shifts:  In: 820 (14.1 mL/kg) [P.O.:820]  Out: 0 (0 mL/kg)   Dosing Weight: 58.2 kg     Past Cardiology Tests (Last 3 Years):  EKG:  ECG 12 lead 06/25/2024    Echo:  Transthoracic Echo (TTE) Complete 06/28/2024    Ejection Fractions:  EF   Date/Time Value Ref Range Status   06/28/2024 09:19 AM 50 %      Cath:  No results found for this or any previous visit from the past 1095 days.    Stress Test:  Nuclear Stress Test 08/01/2022    Cardiac Imaging:  No results found for this or any previous visit from the past 1095 days.      Inpatient Medications:  Scheduled medications   Medication Dose Route Frequency    amoxicillin-pot clavulanate  1 tablet oral BID    aspirin  81 mg oral Daily    atorvastatin  10 mg oral Daily    calcium carbonate-vitamin D3  1 tablet oral BID    clopidogrel  75 mg oral Daily    cyanocobalamin  1,000 mcg oral Daily    enoxaparin  30 mg subcutaneous q24h    folic acid  1 mg oral Daily    furosemide  20 mg oral Daily    insulin glargine  10 Units subcutaneous Nightly    insulin lispro  0-10 Units subcutaneous TID    ipratropium-albuteroL  3 mL nebulization TID    levothyroxine  88 mcg oral Daily    [Held by provider] losartan  100 mg oral Daily    mirtazapine  7.5 mg oral Nightly    oxygen   inhalation Continuous - Inhalation    pantoprazole  20 mg  oral Daily before breakfast    potassium chloride CR  20 mEq oral Daily    sennosides-docusate sodium  1 tablet oral BID     PRN medications   Medication    acetaminophen    dextrose    dextrose    glucagon    glucagon    HYDROmorphone    ipratropium-albuteroL    ondansetron ODT    Or    ondansetron    oxyCODONE    oxyCODONE     Continuous Medications   Medication Dose Last Rate       Physical Exam:  Constitutional: Thin elderly awake/alert/oriented x3, no distress, alert and cooperative  Eyes: PERRL, EOMI, clear sclera  ENMT: mucous membranes moist, no apparent injury, no lesions seen  Head/Neck: Neck supple, no apparent injury, thyroid without mass or tenderness, No JVD, trachea midline, no bruits  Respiratory/Thorax: Moderately diminished left lower lung fields with coarse crackles  Cardiovascular: Regular, rate and rhythm, no murmurs, 2+ equal pulses of the extremities, normal S 1and S 2  Gastrointestinal: Nondistended, soft, non-tender, no rebound tenderness or guarding, no masses palpable, no organomegaly, +BS, no bruits  Musculoskeletal: ROM intact, no joint swelling, normal strength  Extremities: normal extremities, no cyanosis edema, contusions or wounds, no clubbing  Neurological: alert and oriented x3, intact senses, motor, response and reflexes, normal strength  Lymphatic: No significant lymphadenopathy  Psychological: Appropriate mood and behavior  Skin: Warm and dry, no lesions, no rashes      Assessment/Plan   Active Problems:    Postprocedural pneumothorax     Echocardiogram from January 2024, LVEF 50%, left atrial enlargement, normal functioning TAVR, severe mitral annular calcification with mild mitral stenosis, moderate MR, mild TR, moderate LVH and abnormal LV relaxation, mild pulmonary hypertension     Acute on chronic diastolic CHF  -  -CXR showed large left pleural effusion  -I reviewed the Echocardiogram as above  -Strict I & Os  -Daily weights  -2gm na diet  -1500mL fluid  restriction  -Cont lasix  -s/p thoracentesis 1.3L removed and then additional 900mL removed   - Chest tube left chest clamped     2. Elevated troponin, chest pain-Non MI elevated troponin 2/2 CHF  -[facility] in 2021 showed patent LAD stent, s/p PCI/PAMELA to mid RCA  -EKG reviewed, old inferior and anterolateral infarct  -Troponin downtrending  -Cont asa and Plavix  -Cont statin      3. Pneumonia  -CXR showed large pleural effusion, possible pneumonia  -Procal pending  -antibiotics  -bronchodilators  -oxygen support  -sputum culture  -blood cultures  -CT chest post thoracentesis showed consolidation repeat CT pending   -Per pulmonary, pleural fluid consistent with complicated parapneumonic effusion  - Chest tube left chest, clamped     4. Hypertension  -stable  -2gm na diet  -cont meds  -monitor     5. UTI  -Urine culture positive for proteus mirabilis  -Antibiotics  Peripheral IV 06/29/24 22 G Left Hand (Active)   Site Assessment Clean;Dry;Intact 06/30/24 0900   Dressing Status Clean;Dry;Occlusive 06/30/24 0900   Number of days: 1       Code Status:  DNR and No Intubation    I spent 15 minutes in the professional and overall care of this patient.        Nathaniel Quintanilla MD

## 2024-06-30 NOTE — CARE PLAN
The patient's goals for the shift include      The clinical goals for the shift include Pt will remain HDS and have pain controlled this shift.    Over the shift, the patient did make progress toward the following goals. VS stable, medicated for pain as ordered. Monitored and medicated as ordered this shift.

## 2024-06-30 NOTE — CARE PLAN
The patient's goals for the shift include      The clinical goals for the shift include pt will remain HDS this shift.    Over the shift, the patient did make progress toward the following goals.Monitored and medicated as ordered this shift.

## 2024-06-30 NOTE — PROGRESS NOTES
Internal Medicine - Daily Progress Note   Hospital Day: 5       Name:Natalya Lira, AGE: 89 y.o., GENDER: female, MRN: [id number], ROOM: 252/252-A   CODE STATUS: DNR and No Intubation  Attending Physician: Ray Frank DO  Resident: Shira Escamilla MD        Chief Complaint   No chief complaint on file.       Subjective    Natalya Lira is a 89 y.o. year old female patient on Hospital Day: 5 presenting with Acute hypoxic respiratory failure (Multi).    Overnight events:  - No acute events overnight.     Subjective:  - Patient seen and examined, feeling well with no complaints. Deneis any SOB    Meds    Scheduled medications  amoxicillin-pot clavulanate, 1 tablet, oral, BID  aspirin, 81 mg, oral, Daily  atorvastatin, 10 mg, oral, Daily  calcium carbonate-vitamin D3, 1 tablet, oral, BID  clopidogrel, 75 mg, oral, Daily  cyanocobalamin, 1,000 mcg, oral, Daily  enoxaparin, 30 mg, subcutaneous, q24h  folic acid, 1 mg, oral, Daily  furosemide, 20 mg, oral, Daily  insulin glargine, 10 Units, subcutaneous, Nightly  insulin lispro, 0-10 Units, subcutaneous, TID  ipratropium-albuteroL, 3 mL, nebulization, TID  levothyroxine, 88 mcg, oral, Daily  [Held by provider] losartan, 100 mg, oral, Daily  mirtazapine, 7.5 mg, oral, Nightly  oxygen, , inhalation, Continuous - Inhalation  pantoprazole, 20 mg, oral, Daily before breakfast  potassium chloride CR, 20 mEq, oral, Daily  sennosides-docusate sodium, 1 tablet, oral, BID      Continuous medications     PRN medications  PRN medications: acetaminophen, dextrose, dextrose, glucagon, glucagon, HYDROmorphone, ipratropium-albuteroL, ondansetron ODT **OR** ondansetron, oxyCODONE, oxyCODONE     Objective    Physical Exam  Constitutional:       Appearance: Normal appearance.   HENT:      Head: Normocephalic and atraumatic.      Mouth/Throat:      Mouth: Mucous membranes are moist.   Cardiovascular:      Rate and Rhythm: Normal rate and regular rhythm.      Heart sounds: No murmur  "heard.     No friction rub. No gallop.   Pulmonary:      Comments: B/l reduced lung sounds  Abdominal:      General: Abdomen is flat. Bowel sounds are normal.      Palpations: Abdomen is soft. There is no mass.      Tenderness: There is no abdominal tenderness. There is no guarding.   Musculoskeletal:         General: No tenderness. Normal range of motion.      Cervical back: Normal range of motion.      Right lower leg: Edema present.      Left lower leg: Edema present.   Neurological:      Mental Status: She is alert and oriented to person, place, and time.      Comments: But confused        Visit Vitals  /68   Pulse 78   Temp 37 °C (98.6 °F) (Temporal)   Resp 19   Ht 1.499 m (4' 11.02\")   Wt 58.3 kg (128 lb 8.5 oz)   SpO2 96%   BMI 25.95 kg/m²   Smoking Status Former   BSA 1.56 m²        Intake/Output Summary (Last 24 hours) at 6/30/2024 0959  Last data filed at 6/29/2024 1748  Gross per 24 hour   Intake 360 ml   Output --   Net 360 ml       Labs:   Results from last 72 hours   Lab Units 06/30/24  0527 06/29/24  0521 06/28/24  0806   SODIUM mmol/L 135* 133* 136   POTASSIUM mmol/L 4.5 4.4 4.2   CHLORIDE mmol/L 98 99 100   CO2 mmol/L 32 24 28   BUN mg/dL 14 17 17   CREATININE mg/dL 1.11* 1.36* 1.17*   GLUCOSE mg/dL 139* 194* 137*   CALCIUM mg/dL 8.8 8.5* 8.8   ANION GAP mmol/L 10 14 12   EGFR mL/min/1.73m*2 48* 37* 45*   PHOSPHORUS mg/dL 3.9 4.3 4.3      Results from last 72 hours   Lab Units 06/30/24  0527 06/29/24  0522 06/28/24  0806   WBC AUTO x10*3/uL 8.2 9.1 9.5   HEMOGLOBIN g/dL 13.2 12.7 13.9   HEMATOCRIT % 41.5 39.5 42.6   PLATELETS AUTO x10*3/uL 189 152 138*      Lab Results   Component Value Date    CALCIUM 8.8 06/30/2024    PHOS 3.9 06/30/2024      Lab Results   Component Value Date    CRP 1.59 (H) 06/26/2024       Micro/ID:   Susceptibility data from last 90 days.  Collected Specimen Info Organism Ampicillin Cefazolin Cefazolin (uncomplicated UTIs only) Ciprofloxacin Gentamicin Nitrofurantoin " Piperacillin/Tazobactam Tetracycline Trimethoprim/Sulfamethoxazole   06/25/24 Urine from Clean Catch/Voided Proteus mirabilis  S  S  S  S  S  R  S  R  S     Lab Results   Component Value Date    URINECULTURE 20,000 - 80,000 Proteus mirabilis (A) 06/25/2024    BLOODCULT No growth at 3 days 06/26/2024    BLOODCULT No growth at 3 days 06/26/2024     Images    XR chest 1 view  Result Date: 6/29/2024  1. Pigtail catheter looped in the left upper hemithorax. 2. No change in size of a small left basilar ex vacuo pneumothorax   MACRO: None   Signed by: Samantha Smith 6/29/2024 12:58 PM Dictation workstation:   [id number]    ECG 12 lead  Result Date: 6/27/2024  Sinus rhythm with occasional Premature ventricular complexes Left axis deviation Right bundle branch block Possible Lateral infarct (cited on or before 25-JUN-2024) Inferior infarct , age undetermined Abnormal ECG When compared with ECG of 09-SEP-2023 21:22, Previous ECG has undetermined rhythm, needs review Left anterior fascicular block is no longer Present Inferior infarct is now Present Questionable change in initial forces of Lateral leads    CT chest w IV contrast  Result Date: 6/27/2024  1.  Consolidative airspace opacity in the left upper lobe with few scattered surrounding ground-glass opacities is most likely favored to represent pneumonia. This likely corresponds to the rounded airspace opacity seen on immediate prior radiograph. However recommend a short interval follow-up radiograph in 3-6 weeks to document resolution and to exclude an underlying malignancy.   2. Moderate volume left-sided pleural effusion.   3. Mild circumferential thickening of the distal thoracic esophagus. Recommend correlation with reflux.   4. Other stable chronic findings as described above.   MACRO: Critical Finding:  See findings. Notification was initiated on 6/27/2024 at 1:16 pm by Dr. Ning Hansen.  (**-YCF-**) Instructions:   Signed by: Ning Hansen 6/27/2024 1:16 PM  Dictation workstation:   [id number]    XR chest 1 view  Result Date: 6/27/2024  1. Interval decrease in left sided pleural effusion status post thoracentesis with mild residual. Ill-defined rounded airspace opacity in the left mid lung zone could be related to layering pleural effusion, however underlying mass lesion versus pneumonia can also be considered in the differential. Recommend short interval follow-up radiograph versus a CT chest in 3-6 weeks to document resolution. 2. Age-indeterminate transverse fracture through left humeral neck, similar compared to prior imaging.       MACRO: Critical Finding:  See findings. Notification was initiated on 6/27/2024 at 10:16 am by Dr. Ning Hansen.  (**-YCF-**) Instructions:   Signed by: Ning Hansen 6/27/2024 10:16 AM Dictation workstation:   [id number]    US thoracentesis  Result Date: 6/26/2024  Uneventful  left-sided thoracentesis, as detailed above .   Signed by: Albin Urban 6/26/2024 3:53 PM Dictation workstation:   [id number]    XR chest 1 view  Result Date: 6/25/2024  1.  Large left pleural effusion with dense left basilar airspace disease. Underlying pneumonia is in the differential. 2. Hyperinflated lungs.       MACRO: None   Signed by: Beny Root 6/25/2024 5:09 PM Dictation workstation:   [id number]      Assessment and Plan    Natalya Lira is a 89 y.o. female admitted on 6/26/2024 who presents due to shortness of breath, found with acute hypoxic respiratory failure due to pleural effusion and suspected pneumonia. Has PMHx significant for T2DM, HTN, HLD, HFpEF 50% ef 7/2021, GERD, hypothyroidism, rheumatoid arthritis on methotrexate, CAD s/p PCI to RCA and LAD, hx aortic stenosis s/p TAVR 2021, hx DVT was on Eliquis, CKD3, hx mild cognitive impairment, macular degeneration of eyes, hx dvt (previously on eliquis).  Pulmonology on board.  Currently patient status post thoracentesis on 06/26/2024 with 1300 cc fluid removal. Had repeat thora on  06/28/2024 by pulmonology with 900 ml fluid removal.     ACUTE MEDICAL ISSUES:  # Acute Pneumothorax  - CT chest done yesterday showed Moderate volume left-sided pleural effusion  - Pulmonology on board, pt. S/p Lt sided thoracentesis and 900 ml fluid was removed. Complicated by post procedure pneumothorax.  - Chest tube placed 6/28, clamped 6/29.  - Repeat CXR on 6/29 with pneumo unchanged in size   [ ] Repeat CT chest ordered 06/30/2024: Pending  - Will need follow up CT chest in 2 months as outpatient     # Acute hypoxic respiratory failure  # Large Left pleural effusion - Improved after thoracentesis   # Suspected community acquired pneumonia   - Sx: Nonresolving shortness of breath  - Labs: No leukocytosis, elevated BNP at 776, elevated CRP at 1.59.  Troponins 79 --> 73, Procal 0.04.  Strep pneumo, Legionella negative. Blood cultures drawn on 06/26 NG1D  - Imaging:  CXR Large left pleural effusion with dense left basilar airspace disease. Underlying pneumonia is in the differential.   - Echo from 08/02/2021: LVEF 50%, mildly dilated LA, moderate LVH, normal appearance TAVR, mild periprosthetic aortic regurgitstion, moderate MR, mild to moderate TR, moderate pulm HTN  - Abx: Augmentin 875 - 125 mg BID for days (06/27/2024 - 07/04/2024) and Azithro 500 mg every day  (06/26/2024 - 06/28/2024)   - Tx: Lasix 20 mg every day.  On potassium 20 mEq supplement for diuretic induced hypokalemia  - Echo 06/28/24: LVEF 50%, impaired relaxation pattern of LV diastolic filling, normal right ventricular global systolic function, LA mildly dilated, moderate left pleural effusion, moderate to severe mitral annular calcification, bioprosthetic aortic valve which exhibits normal function  - Pulm consulted: Patient s/p IR guided thoracentesis done on 06/26/2024. CXR afterwards showed Interval decrease in left sided pleural effusion status post thoracentesis with mild residual Ill-defined rounded airspace opacity in the left mid  lung zone could be related to layering pleural effusion, however underlying mass lesion versus pneumonia can also be considered in the differential.   - CT chest showed onsolidative airspace opacity in the left upper lobe with few scattered surrounding ground-glass opacities is most likely favored to represent pneumonia. This likely corresponds to the rounded airspace opacity seen on immediate prior radiograph. However recommend a short interval follow-up radiograph in 3-6 weeks to document resolution and to exclude an underlying malignancy.   - Repeat CT pending  - Resp Regimen: Duoneb, Acapella + IS  - Supportive: I/O, 1500 ml fluid restriction, 2 g Na restriction, supplemental oxygen to maintain O2 sats > 90%  - Follow up with PCP/ Pulm in 3-6 weeks with repeat CT     # Asymptomatic UTI:  - UC from 06/25/2024: 20,000 - 80,000 proteus mirabilis  - Abx: Currently being covered with Augmentin 875 - 125 mg BID for 7 days for another problem    CHRONIC MEDICAL ISSUES:  # CAD s/p stent - Continue home aspirin 81 mg every day and Plavix 75 mg every day        # hx DVT - more than 1 year ago, was on Eliquis and was advised to stop due to multiple falls.   # hx aortic stenosis s/p TAVR - Evolut Pro+ 26mm 6/29/21 non mechanical.   # T2DM - hold home Glimepiride 4 mg twice daily. SSI #2 (0 to 10 units 3 times daily)  # HTN - HOLD losartan 100 mg every day d/t elevated Cr            # hx rheumatoid arthritis - Hold methotrexate, continue folate 1 mg every day  # MDD / insomnia - On mirtazapine 7.5 nightly  # HLD - On atorvastatin 10 mg every day        # GERD - On protonix 20 mg every day           # Hypothyroidism - On levothyroxine 88 mcg every day          # Hypovitaminosis: On Vit B12 1000 mcg every day, folate 1 mg every day  # Constipation: On Roberta-Colace 8.6 - 50 mg 1 tab nightly    Fluids: None, 1500 ml fluid restriction  Electrolytes: Replete as needed    Nutrition: Cardiac + Diabetic   Antimicrobials: Ceftriaxone  1g Q24Hr  (6/26 - 06/30) and Azithro 500 mg every day (6/26 - 06/28)   DVT ppx:  SCD; Lovenox 30 mg subcutaneous every day   GI ppx: protonix 20 mg every day   Catheter: External female catheter  Lines: 20 G PIV in rt. antecubital  Supplemental Oxygen: On RA  Emergency Contact: Extended Emergency Contact Information  Primary Emergency Contact: Phyllis Lira  Home Phone: [phone]  Relation: Grandchild  Secondary Emergency Contact: Migel Lira  Address: [address] [place] [address]  Home Phone: [phone]  Relation: None   Code: DNR and No Intubation     Disposition:  Has no home going needs. PT/OT recommends low intensity. Will be discharged home once primary issue resolved.     Lorena Poole,   Internal Medicine, PGY-2  06/30/24 at 9:59 AM

## 2024-07-01 ENCOUNTER — APPOINTMENT (OUTPATIENT)
Dept: RADIOLOGY | Facility: HOSPITAL | Age: 89
End: 2024-07-01
Payer: MEDICARE

## 2024-07-01 LAB
ALBUMIN SERPL BCP-MCNC: 2.9 G/DL (ref 3.4–5)
ANION GAP SERPL CALC-SCNC: 9 MMOL/L (ref 10–20)
BUN SERPL-MCNC: 16 MG/DL (ref 6–23)
CALCIUM SERPL-MCNC: 8.6 MG/DL (ref 8.6–10.3)
CHLORIDE SERPL-SCNC: 101 MMOL/L (ref 98–107)
CO2 SERPL-SCNC: 29 MMOL/L (ref 21–32)
CREAT SERPL-MCNC: 1.07 MG/DL (ref 0.5–1.05)
EGFRCR SERPLBLD CKD-EPI 2021: 50 ML/MIN/1.73M*2
ERYTHROCYTE [DISTWIDTH] IN BLOOD BY AUTOMATED COUNT: 15.3 % (ref 11.5–14.5)
GLUCOSE BLD MANUAL STRIP-MCNC: 130 MG/DL (ref 74–99)
GLUCOSE BLD MANUAL STRIP-MCNC: 181 MG/DL (ref 74–99)
GLUCOSE BLD MANUAL STRIP-MCNC: 218 MG/DL (ref 74–99)
GLUCOSE BLD MANUAL STRIP-MCNC: 228 MG/DL (ref 74–99)
GLUCOSE SERPL-MCNC: 143 MG/DL (ref 74–99)
HCT VFR BLD AUTO: 38.2 % (ref 36–46)
HGB BLD-MCNC: 12.4 G/DL (ref 12–16)
LABORATORY COMMENT REPORT: NORMAL
LABORATORY COMMENT REPORT: NORMAL
MAGNESIUM SERPL-MCNC: 2.11 MG/DL (ref 1.6–2.4)
MCH RBC QN AUTO: 32.8 PG (ref 26–34)
MCHC RBC AUTO-ENTMCNC: 32.5 G/DL (ref 32–36)
MCV RBC AUTO: 101 FL (ref 80–100)
NRBC BLD-RTO: 0 /100 WBCS (ref 0–0)
PATH REPORT.FINAL DX SPEC: NORMAL
PATH REPORT.GROSS SPEC: NORMAL
PATH REPORT.RELEVANT HX SPEC: NORMAL
PATH REPORT.TOTAL CANCER: NORMAL
PHOSPHATE SERPL-MCNC: 3.7 MG/DL (ref 2.5–4.9)
PLATELET # BLD AUTO: 205 X10*3/UL (ref 150–450)
POTASSIUM SERPL-SCNC: 4.4 MMOL/L (ref 3.5–5.3)
RBC # BLD AUTO: 3.78 X10*6/UL (ref 4–5.2)
SODIUM SERPL-SCNC: 135 MMOL/L (ref 136–145)
WBC # BLD AUTO: 8.1 X10*3/UL (ref 4.4–11.3)

## 2024-07-01 PROCEDURE — 97530 THERAPEUTIC ACTIVITIES: CPT | Mod: GO,CO

## 2024-07-01 PROCEDURE — 2500000004 HC RX 250 GENERAL PHARMACY W/ HCPCS (ALT 636 FOR OP/ED)

## 2024-07-01 PROCEDURE — 94640 AIRWAY INHALATION TREATMENT: CPT

## 2024-07-01 PROCEDURE — 2500000001 HC RX 250 WO HCPCS SELF ADMINISTERED DRUGS (ALT 637 FOR MEDICARE OP): Performed by: NURSE PRACTITIONER

## 2024-07-01 PROCEDURE — 99232 SBSQ HOSP IP/OBS MODERATE 35: CPT

## 2024-07-01 PROCEDURE — 2500000002 HC RX 250 W HCPCS SELF ADMINISTERED DRUGS (ALT 637 FOR MEDICARE OP, ALT 636 FOR OP/ED)

## 2024-07-01 PROCEDURE — 94760 N-INVAS EAR/PLS OXIMETRY 1: CPT

## 2024-07-01 PROCEDURE — 80069 RENAL FUNCTION PANEL: CPT

## 2024-07-01 PROCEDURE — 71045 X-RAY EXAM CHEST 1 VIEW: CPT

## 2024-07-01 PROCEDURE — 82947 ASSAY GLUCOSE BLOOD QUANT: CPT

## 2024-07-01 PROCEDURE — 94668 MNPJ CHEST WALL SBSQ: CPT

## 2024-07-01 PROCEDURE — 1200000002 HC GENERAL ROOM WITH TELEMETRY DAILY

## 2024-07-01 PROCEDURE — 71046 X-RAY EXAM CHEST 2 VIEWS: CPT

## 2024-07-01 PROCEDURE — 71046 X-RAY EXAM CHEST 2 VIEWS: CPT | Performed by: RADIOLOGY

## 2024-07-01 PROCEDURE — 2500000001 HC RX 250 WO HCPCS SELF ADMINISTERED DRUGS (ALT 637 FOR MEDICARE OP)

## 2024-07-01 PROCEDURE — 99233 SBSQ HOSP IP/OBS HIGH 50: CPT | Performed by: INTERNAL MEDICINE

## 2024-07-01 PROCEDURE — 36415 COLL VENOUS BLD VENIPUNCTURE: CPT

## 2024-07-01 PROCEDURE — 71045 X-RAY EXAM CHEST 1 VIEW: CPT | Performed by: STUDENT IN AN ORGANIZED HEALTH CARE EDUCATION/TRAINING PROGRAM

## 2024-07-01 PROCEDURE — 85027 COMPLETE CBC AUTOMATED: CPT

## 2024-07-01 PROCEDURE — 2500000002 HC RX 250 W HCPCS SELF ADMINISTERED DRUGS (ALT 637 FOR MEDICARE OP, ALT 636 FOR OP/ED): Performed by: INTERNAL MEDICINE

## 2024-07-01 PROCEDURE — 83735 ASSAY OF MAGNESIUM: CPT

## 2024-07-01 ASSESSMENT — COGNITIVE AND FUNCTIONAL STATUS - GENERAL
DAILY ACTIVITIY SCORE: 15
DRESSING REGULAR UPPER BODY CLOTHING: A LITTLE
EATING MEALS: A LITTLE
PERSONAL GROOMING: A LITTLE
MOVING TO AND FROM BED TO CHAIR: A LITTLE
TURNING FROM BACK TO SIDE WHILE IN FLAT BAD: A LITTLE
MOBILITY SCORE: 17
TOILETING: A LOT
STANDING UP FROM CHAIR USING ARMS: A LITTLE
TOILETING: A LOT
HELP NEEDED FOR BATHING: A LOT
DRESSING REGULAR LOWER BODY CLOTHING: A LOT
HELP NEEDED FOR BATHING: A LITTLE
PERSONAL GROOMING: A LITTLE
CLIMB 3 TO 5 STEPS WITH RAILING: A LOT
DRESSING REGULAR UPPER BODY CLOTHING: A LITTLE
DAILY ACTIVITIY SCORE: 17
WALKING IN HOSPITAL ROOM: A LITTLE
MOVING FROM LYING ON BACK TO SITTING ON SIDE OF FLAT BED WITH BEDRAILS: A LITTLE
DRESSING REGULAR LOWER BODY CLOTHING: A LOT

## 2024-07-01 ASSESSMENT — PAIN DESCRIPTION - LOCATION
LOCATION: CHEST

## 2024-07-01 ASSESSMENT — PAIN SCALES - GENERAL
PAINLEVEL_OUTOF10: 10 - WORST POSSIBLE PAIN
PAINLEVEL_OUTOF10: 6
PAINLEVEL_OUTOF10: 0 - NO PAIN
PAINLEVEL_OUTOF10: 8
PAINLEVEL_OUTOF10: 10 - WORST POSSIBLE PAIN
PAINLEVEL_OUTOF10: 0 - NO PAIN

## 2024-07-01 ASSESSMENT — PAIN DESCRIPTION - ORIENTATION
ORIENTATION: LEFT

## 2024-07-01 ASSESSMENT — PAIN - FUNCTIONAL ASSESSMENT
PAIN_FUNCTIONAL_ASSESSMENT: 0-10
PAIN_FUNCTIONAL_ASSESSMENT: 0-10

## 2024-07-01 NOTE — CARE PLAN
Problem: Diabetes  Goal: Achieve decreasing blood glucose levels by end of shift  Outcome: Progressing  Goal: Increase stability of blood glucose readings by end of shift  Outcome: Progressing  Goal: Maintain glucose levels >70mg/dl to <250mg/dl throughout shift  Outcome: Progressing  Goal: Vital signs within normal range for age by end of shift  Outcome: Progressing     Problem: Pain - Adult  Goal: Verbalizes/displays adequate comfort level or baseline comfort level  Outcome: Progressing     Problem: Safety - Adult  Goal: Free from fall injury  Outcome: Progressing     Problem: Discharge Planning  Goal: Discharge to home or other facility with appropriate resources  Outcome: Progressing       The clinical goals for the shift include Pt will remain HDS this shift progressing  1830: pt skin checked, has mepilex on buttock for protection, Chest tube pulled by doctor , area is clean,  dressing dry and intact, generalized bruising.

## 2024-07-01 NOTE — SIGNIFICANT EVENT
Chest tube removal 7/1/24 14:30    Chest tube was clamped for 24hours prior to removal. Repeat CXR showed improvement in the left apical pneumothorax and there has been fluid reaccumulation in the left basilar area. Suture was removed, xeroform, 4x4 gauze, tegaderm was placed while taking out the chest tube. Patient tolerated chest tube removal well. Repeat Chest XRAY (PA/Lateral view) ordered 17:00 post removal to rule out worsening of pneumothorax.

## 2024-07-01 NOTE — PROGRESS NOTES
07/01/24 1018   Discharge Planning   Living Arrangements Family members   Support Systems Family members   Assistance Needed X3, independent in ADLS, at times assist from family, has a rollator and shower chair   Type of Residence Private residence   Number of Stairs to Enter Residence 5   Number of Stairs Within Residence 0   Do you have animals or pets at home? No   Who is requesting discharge planning? Provider   Home or Post Acute Services In home services   Type of Home Care Services Home PT   Patient expects to be discharged to: Home with granddaughter and [facility] (PT only, enchanced does not currently have SN or OT and daughter confirmed she still wanted PT only through [facility]), Referral sent in MD Jessica to send outgoing referral prior to dc.   Does the patient need discharge transport arranged? No   Patient Choice   Provider Choice list and CMS website (https://medicare.gov/care-compare#search) for post-acute Quality and Resource Measure Data were provided and reviewed with: Family   Patient / Family choosing to utilize agency / facility established prior to hospitalization No

## 2024-07-01 NOTE — PROGRESS NOTES
"Natalya Lira is a 89 y.o. female on day 5 of admission presenting with Acute hypoxic respiratory failure (Multi).    Subjective   No acute overnight events. Patient doing well this morning. She still endorses pain at chest tube site but otherwise no new complaints.     Objective     Visit Vitals  /50 (BP Location: Right arm, Patient Position: Lying)   Pulse 76   Temp 36.5 °C (97.7 °F) (Temporal)   Resp 12   Ht 1.499 m (4' 11.02\")   Wt 45.7 kg (100 lb 12 oz)   SpO2 94%   BMI 20.34 kg/m²   Smoking Status Former   BSA 1.38 m²       Results from last 7 days   Lab Units 07/01/24  0519   WBC AUTO x10*3/uL 8.1   HEMOGLOBIN g/dL 12.4   HEMATOCRIT % 38.2   PLATELETS AUTO x10*3/uL 205     Results from last 7 days   Lab Units 07/01/24  0519   SODIUM mmol/L 135*   POTASSIUM mmol/L 4.4   CHLORIDE mmol/L 101   CO2 mmol/L 29   BUN mg/dL 16   CREATININE mg/dL 1.07*   EGFR mL/min/1.73m*2 50*   GLUCOSE mg/dL 143*   CALCIUM mg/dL 8.6   PHOSPHORUS mg/dL 3.7     Results from last 7 days   Lab Units 07/01/24  0519   MAGNESIUM mg/dL 2.11       Physical Exam  Constitutional:       Appearance: Normal appearance.   HENT:      Head: Normocephalic and atraumatic.      Mouth/Throat:      Mouth: Mucous membranes are moist.   Cardiovascular:      Rate and Rhythm: Normal rate and regular rhythm.      Heart sounds: No murmur heard.     No friction rub. No gallop.   Pulmonary:      Comments: B/l reduced lung sounds  Abdominal:      General: Abdomen is flat. Bowel sounds are normal.      Palpations: Abdomen is soft. There is no mass.      Tenderness: There is no abdominal tenderness. There is no guarding.   Musculoskeletal:         General: No tenderness. Normal range of motion.      Cervical back: Normal range of motion.      Right lower leg: Edema present.      Left lower leg: Edema present.   Neurological:      Mental Status: She is alert and oriented to person, place, and time.      Comments: But confused     Last Recorded Vitals  Blood " "pressure 121/50, pulse 76, temperature 36.5 °C (97.7 °F), temperature source Temporal, resp. rate 12, height 1.499 m (4' 11.02\"), weight 45.7 kg (100 lb 12 oz), SpO2 94%.  Intake/Output last 3 Shifts:  I/O last 3 completed shifts:  In: 360 (6.2 mL/kg) [P.O.:360]  Out: 310 (5.3 mL/kg) [Urine:310 (0.1 mL/kg/hr)]  Dosing Weight: 58.2 kg     Relevant Results  amoxicillin-pot clavulanate, 1 tablet, oral, BID  aspirin, 81 mg, oral, Daily  atorvastatin, 10 mg, oral, Daily  calcium carbonate-vitamin D3, 1 tablet, oral, BID  clopidogrel, 75 mg, oral, Daily  cyanocobalamin, 1,000 mcg, oral, Daily  enoxaparin, 30 mg, subcutaneous, q24h  folic acid, 1 mg, oral, Daily  furosemide, 20 mg, oral, Daily  insulin glargine, 10 Units, subcutaneous, Nightly  insulin lispro, 0-10 Units, subcutaneous, TID  ipratropium-albuteroL, 3 mL, nebulization, TID  levothyroxine, 88 mcg, oral, Daily  losartan, 100 mg, oral, Daily  mirtazapine, 7.5 mg, oral, Nightly  pantoprazole, 20 mg, oral, Daily before breakfast  potassium chloride CR, 20 mEq, oral, Daily  sennosides-docusate sodium, 1 tablet, oral, BID      Assessment/Plan   Active Problems:    Postprocedural pneumothorax  Natalya Lira is a 89 y.o. Female w/ PMHx significant for T2DM, HTN, HLD, HFpEF 50% ef 7/2021, CAD s/p PCI to RCA and LAD, hx aortic stenosis s/p TAVR 2021, hx DVT was on Eliquis, CKD3, hx mild cognitive impairment, hx dvt (previously on eliquis) admitted for acute hypoxic respiratory failure due to pleural effusion and suspected pneumonia.  Pulmonology on board, s/p thoracentesis complicated by pneumothorax, s/p chest tube placement. Chest tube currently clamped and planned for removal.      Updates 7/1  - Chest tube clamped, planned for removal today  - F/up pulmonology recs    ACUTE MEDICAL ISSUES:  # Acute Pneumothorax  - Pulmonology on board, pt. S/p Lt sided thoracentesis ~2.2 L drained total. Complicated by post procedure pneumothorax.  - Chest tube placed 6/28, clamped " 6/29.  - Plan to remove chest tube on 7/1  - Will need follow up CT chest in 2 months as outpatient      # Acute hypoxic respiratory failure - Resolved  # Large Left pleural effusion - Improved after thoracentesis   # Community acquired pneumonia - Appears resolving nearly completely resolved  - Abx: Augmentin 875 - 125 mg BID for days (06/27/2024 - 07/04/2024) and Azithro 500 mg every day  (06/26/2024 - 06/28/2024)   - Pulm consulted: Patient s/p IR guided thoracentesis done on 06/26/2024. CXR afterwards showed Interval decrease in left sided pleural effusion status post thoracentesis with mild residual Ill-defined rounded airspace opacity in the left mid lung zone could be related to layering pleural effusion, however underlying mass lesion versus pneumonia can also be considered in the differential.   - CT chest showed onsolidative airspace opacity in the left upper lobe with few scattered surrounding ground-glass opacities is most likely favored to represent pneumonia. This likely corresponds to the rounded airspace opacity seen on immediate prior radiograph. However recommend a short interval follow-up radiograph in 3-6 weeks to document resolution and to exclude an underlying malignancy.   - Repeat CT pending  - Resp Regimen: Duoneb, Acapella + IS  - Supportive: I/O, 1500 ml fluid restriction, 2 g Na restriction, supplemental oxygen to maintain O2 sats > 90%  - Follow up with PCP/ Pulm in 3-6 weeks with repeat CT      # Asymptomatic UTI:  - UC from 06/25/2024: 20,000 - 80,000 proteus mirabilis  - Abx: Currently being covered with Augmentin 875 - 125 mg BID for 7 days for another problem     CHRONIC MEDICAL ISSUES:  # CAD s/p stent - Continue home aspirin 81 mg every day and Plavix 75 mg every day      - Echo 06/28/24: LVEF 50%, impaired relaxation pattern of LV diastolic filling, normal right ventricular global systolic function, LA mildly dilated, moderate left pleural effusion, moderate to severe mitral  annular calcification, bioprosthetic aortic valve which exhibits normal function   - Tx: Lasix 20 mg every day.  On potassium 20 mEq supplement for diuretic induced hypokalemia   # hx DVT - more than 1 year ago, was on Eliquis and was advised to stop due to multiple falls.   # hx aortic stenosis s/p TAVR - Evolut Pro+ 26mm 6/29/21 non mechanical.   # T2DM - hold home Glimepiride 4 mg twice daily. SSI #2 (0 to 10 units 3 times daily)  # HTN - HOLD losartan 100 mg every day d/t elevated Cr            # hx rheumatoid arthritis - Hold methotrexate, continue folate 1 mg every day  # MDD / insomnia - On mirtazapine 7.5 nightly  # HLD - On atorvastatin 10 mg every day        # GERD - On protonix 20 mg every day           # Hypothyroidism - On levothyroxine 88 mcg every day          # Hypovitaminosis: On Vit B12 1000 mcg every day, folate 1 mg every day  # Constipation: On Roberta-Colace 8.6 - 50 mg 1 tab nightly     Fluids: None, 1500 ml fluid restriction  Electrolytes: Replete as needed    Nutrition: Cardiac + Diabetic   Antimicrobials: Ceftriaxone 1g Q24Hr  (6/26 - 06/30) and Azithro 500 mg every day (6/26 - 06/28)   DVT ppx:  SCD; Lovenox 30 mg subcutaneous every day   GI ppx: protonix 20 mg every day   Catheter: External female catheter  Lines: 20 G PIV in rt. antecubital  Supplemental Oxygen: On RA  Emergency Contact: Extended Emergency Contact Information  Primary Emergency Contact: Phyllis Lira  Home Phone: [phone]  Relation: Grandchild  Secondary Emergency Contact: Migel Lira  Address: [facility] [id number]  Home Phone: [phone]  Relation: None   Code: DNR and No Intubation      Disposition:  Has no home going needs. PT/OT recommends low intensity. Will be discharged home once primary issue resolved.    Dimitri Fulton MD

## 2024-07-01 NOTE — PROGRESS NOTES
Occupational Therapy    Occupational Therapy Treatment    Name: Natalya Lira  MRN: [id number]  : 1935  Date: 24  Time Calculation  Start Time: 1309  Stop Time: 1322  Time Calculation (min): 13 min    Assessment:  OT Assessment: Pt presents with SOB, decreased balance, strength, and endurance. Pt could from OT services to work on ADL's, transfers, and mobility  Prognosis: Good  Barriers to Discharge: None  Evaluation/Treatment Tolerance: Patient tolerated treatment well  Medical Staff Made Aware: Yes  End of Session Communication: Bedside nurse  End of Session Patient Position: Bed, 3 rail up, Alarm on  Plan:  Treatment Interventions: ADL retraining, Functional transfer training, UE strengthening/ROM, Endurance training  OT Frequency: 2 times per week  OT Discharge Recommendations: Low intensity level of continued care  Equipment Recommended upon Discharge: Wheeled walker (owns)  OT Recommended Transfer Status: Assist of 1  OT - OK to Discharge: Yes (In accord with OT POC)    Subjective   Previous Visit Info:  OT Last Visit  OT Received On: 24  General:  General  Reason for Referral: Impaired mobility, acute hypoxic respiratory failure  Referred By: Ray Frank  Past Medical History Relevant to Rehab: T2DM, HTN, HLD, HFpEF 50% ef 2021, GERD, hypothyroidism, rheumatoid arthritis on methotrexate, CAD s/p PCI to RCA and LAD, hx aortic stenosis s/p TAVR, CKD3, hx mild cognitive impairment, macular degeneration of eyes, hx dvt  Family/Caregiver Present: No  Prior to Session Communication: Bedside nurse (Nurse reports pt appropriate for limited treatment with no new restrictions, requested up to beside only as tolerated. Chest tube being discontinued this afternoon.)  Patient Position Received: Bed, 3 rail up, Alarm on  Preferred Learning Style: verbal  General Comment: Pt pleasant and cooperative with education and limited treament. Treatment limited to tolerence.  Precautions:  Medical Precautions:  Fall precautions (purewick, telemetry,)    Vitals:  O2 sats maintained 96% and above over course of session.     Pain Assessment:  Pain Assessment  Pain Assessment: 0-10  0-10 (Numeric) Pain Score: 6  Pain Type: Surgical pain  Pain Location: Chest  Pain Orientation: Left  Clinical Progression: Gradually improving  Pain Interventions: Repositioned, Ambulation/increased activity     Objective   Cognition:  Overall Cognitive Status: Within Functional Limits  Orientation Level: Oriented X4  Activities of Daily Living: UE Dressing  UE Dressing Level of Assistance: Setup, Contact guard  UE Dressing Where Assessed: Edge of bed  UE Dressing Comments: Donned gown with assist lashell thread arms through sleeves.     Bed Mobility/Transfers: Bed Mobility  Bed Mobility: Yes  Bed Mobility 1  Bed Mobility 1: Supine to sitting, Sitting to supine  Level of Assistance 1: Contact guard  Bed Mobility Comments 1: Use of bedrail and increased time.    Sitting Balance:  Pt demonstrated fair sitting balance over 10 minutes sitting at EOB.     Outcome Measures:  [facility] Daily Activity  Putting on and taking off regular lower body clothing: A lot  Bathing (including washing, rinsing, drying): A little  Putting on and taking off regular upper body clothing: A little  Toileting, which includes using toilet, bedpan or urinal: A lot  Taking care of personal grooming such as brushing teeth: A little  Eating Meals: None  Daily Activity - Total Score: 17    Education Documentation  Body Mechanics, taught by Barber Nathan OT at 7/1/2024  2:23 PM.  Learner: Patient  Readiness: Acceptance  Method: Explanation, Demonstration  Response: Verbalizes Understanding, Demonstrated Understanding, Needs Reinforcement    Precautions, taught by Barber Nathan OT at 7/1/2024  2:23 PM.  Learner: Patient  Readiness: Acceptance  Method: Explanation, Demonstration  Response: Verbalizes Understanding, Demonstrated Understanding, Needs Reinforcement    ADL Training, taught  by Barber Nathan, OT at 7/1/2024  2:23 PM.  Learner: Patient  Readiness: Acceptance  Method: Explanation, Demonstration  Response: Verbalizes Understanding, Demonstrated Understanding, Needs Reinforcement    Education Comments  Continued education provided over course of session including energy conservation and work simplification in course of treatment.    Goals:  Encounter Problems       Encounter Problems (Active)       OT Goals       Pt to demonstrate transfers with FWW at mod I  (Progressing)       Start:  06/26/24    Expected End:  07/10/24            Pt will demo increased functional mobility to tolerate tasks necessary to complete ADL routine with FWW at mod I while keeping SpO2 above 88% (Progressing)       Start:  06/26/24    Expected End:  07/10/24            Pt will increase endurance to tolerate 15 min of activity with no more than 1 rest break in order to increase ability to engage in ADL completion.  (Progressing)       Start:  06/26/24    Expected End:  07/10/24            Pt to demonstrate LB dressing, bathing, and toileting at mod I  (Progressing)       Start:  06/26/24    Expected End:  07/10/24            Pt will demo good static/dynamic standing balance during ADL and functional activity with FWW for improved independence and participation in ADL  (Progressing)       Start:  06/26/24    Expected End:  07/10/24

## 2024-07-01 NOTE — PROGRESS NOTES
Department of Medicine  Division of Pulmonary, Critical Care, and Sleep Medicine    Natalya Lira is a 89 y.o. female on day 5 of admission presenting with Acute hypoxic respiratory failure (Multi).    Subjective   Chest tube removed at 2:30pm today after being clamped for 24hours. CXR post removal did not show worsening of pneumothorax. Patient denies any new complaints.     Objective     Vital Signs      6/30/2024     6:15 AM 6/30/2024    11:18 AM 6/30/2024     4:00 PM 6/30/2024     7:46 PM 7/1/2024     6:15 AM 7/1/2024     9:00 AM 7/1/2024    10:13 AM   Vitals   Systolic 141   117 146 152 121   Diastolic 68   52 55 62 50   Heart Rate     67 76    Temp  36.7 °C (98.1 °F) 36.9 °C (98.4 °F) 36.2 °C (97.2 °F) 36.2 °C (97.2 °F)  36.5 °C (97.7 °F)   Resp     16 14 12   Weight (lb)     100.75     BMI     20.34 kg/m2     BSA (m2)     1.38 m2          Physical exam  Constitutional: Normal appearance.  HEENT: Normocephalic and atraumatic.  Cardiovascular: Normal rate and regular rhythm.  Pulmonary: Normal respiratory effort, bilateral clear breath sounds, no wheezing or rhonchi.  Musculoskeletal: No edema, no cyanosis.  Neurological: Awake, alert and oriented x3.  Psychiatric: Normal behavior, mood and affect.    Labs:  Lab Results   Component Value Date    WBC 8.1 07/01/2024    HGB 12.4 07/01/2024    HCT 38.2 07/01/2024     (H) 07/01/2024     07/01/2024      Lab Results   Component Value Date    GLUCOSE 143 (H) 07/01/2024    CALCIUM 8.6 07/01/2024     (L) 07/01/2024    K 4.4 07/01/2024    CO2 29 07/01/2024     07/01/2024    BUN 16 07/01/2024    CREATININE 1.07 (H) 07/01/2024      Lab Results   Component Value Date    ALT 17 06/25/2024    AST 29 06/25/2024    ALKPHOS 78 06/25/2024    BILITOT 1.1 06/25/2024        Oxygen Therapy  SpO2: 94 %  Medical Gas Therapy: None (Room air)  O2 Delivery Method: Nasal cannula       Intake/Output last 3 Shifts:  I/O last 3 completed shifts:  In: 360 (6.2 mL/kg)  [P.O.:360]  Out: 310 (5.3 mL/kg) [Urine:310 (0.1 mL/kg/hr)]  Dosing Weight: 58.2 kg       Medications   Scheduled medications  amoxicillin-pot clavulanate, 1 tablet, oral, BID  aspirin, 81 mg, oral, Daily  atorvastatin, 10 mg, oral, Daily  calcium carbonate-vitamin D3, 1 tablet, oral, BID  clopidogrel, 75 mg, oral, Daily  cyanocobalamin, 1,000 mcg, oral, Daily  enoxaparin, 30 mg, subcutaneous, q24h  folic acid, 1 mg, oral, Daily  furosemide, 20 mg, oral, Daily  insulin glargine, 10 Units, subcutaneous, Nightly  insulin lispro, 0-10 Units, subcutaneous, TID  ipratropium-albuteroL, 3 mL, nebulization, TID  levothyroxine, 88 mcg, oral, Daily  losartan, 100 mg, oral, Daily  mirtazapine, 7.5 mg, oral, Nightly  pantoprazole, 20 mg, oral, Daily before breakfast  potassium chloride CR, 20 mEq, oral, Daily  sennosides-docusate sodium, 1 tablet, oral, BID      Continuous medications     PRN medications  PRN medications: acetaminophen, dextrose, dextrose, glucagon, glucagon, HYDROmorphone, ipratropium-albuteroL, ondansetron ODT **OR** ondansetron, oxyCODONE, oxyCODONE     Allergies  Patient has no known allergies.    Chest Radiograph   CT chest wo IV contrast 06/30/2024    Narrative  Interpreted By:  La Maravilla,  STUDY:  CT CHEST WO IV CONTRAST;  6/30/2024 10:10 am    INDICATION:  Signs/Symptoms:evaluate for ex vacuo pneumothorax.    COMPARISON:  CT chest 06/27/2024.    ACCESSION NUMBER(S):  [id number]    ORDERING CLINICIAN:  BRENDA CHARLES    TECHNIQUE:  Helical data acquisition of the chest was obtained without IV  contrast material.  Images were reformatted in axial, coronal, and  sagittal planes.    FINDINGS:  LINE AND DEVICES:  Interval placement of left chest tube with pigtail along the left  superolateral pleural space.    LUNGS AND AIRWAYS:  The trachea and central airways are patent without endobronchial  lesions.    There is interval decrease in size of left pleural effusion, now  small in size. There is new  small to moderate left pneumothorax.  Previously shown left upper lobe consolidative opacities are nearly  resolved with a small focus of consolidation remaining inferiorly.  Persistent compressive atelectasis in the left lower lobe. Mild right  lower lobe dependent atelectasis. No new consolidation, edema,  pleural effusion or pneumothorax in the right lung. No suspicious  nodule.    MEDIASTINUM AND AMERICO, LOWER NECK AND AXILLA:  The thyroid is absent or atrophic..    No pathologically enlarged thoracic lymphadenopathy.    Esophagus appears within normal limits.    HEART AND VESSELS:  Severe atherosclerotic calcifications of the thoracic aorta without  aneurysmal dilation. Stable appearing aortic root stent graft.    Main pulmonary artery is normal in caliber.    Trivessel coronary artery calcifications, severe in the RCA and LAD  artery. The study is not optimized for evaluation of coronary  arteries.    The cardiac chambers are not enlarged. Dense mitral calcifications.    Minimal pericardial effusion.    UPPER ABDOMEN:  Right hepatic dome cyst. Right renal calculus versus intrarenal  vascular calcification. No right hydronephrosis as imaged. Unchanged  mildly dilated intrahepatic and extrahepatic bile ducts likely  related to cholecystectomy status.    CHEST WALL AND OSSEOUS STRUCTURES:  No acute fracture or chest wall soft tissue abnormalities. T9  vertebral hemangioma. Unchanged L1 compression deformity.    Impression  Decreased size now small left pleural effusion with new small to  moderate left pneumothorax status post chest tube placement.  Near-complete resolution of left upper lobe consolidative opacities.    MACRO:  None    Signed by: La Maravilla 7/1/2024 8:42 AM  Dictation workstation:   [id number]      CT chest w IV contrast 06/27/2024    Narrative  Interpreted By:  Ning Hansen,  STUDY:  CT CHEST W IV CONTRAST;  6/27/2024 11:20 am    INDICATION:  Signs/Symptoms:left sided mass suspicious for  lung cancer.    COMPARISON:  CT chest abdomen pelvis dated 09/09/2023    ACCESSION NUMBER(S):  [id number]    ORDERING CLINICIAN:  BRENDA CHARLES    TECHNIQUE:  Helical data acquisition of the chest was obtained following  intravenous administration of 75 mL of 350 mg iohexol. Images were  reformatted in axial, coronal, and sagittal planes.    FINDINGS:  LUNGS AND AIRWAYS:  The trachea and central airways are patent. There is moderate volume  left-sided pleural effusion. There is consolidative airspace opacity  in the left upper lobe with surrounding ground-glass changes. There  are also additional few scattered peribronchovascular ground-glass  opacities in the inferior lingular segment. These findings are  concerning for pneumonia. There is smooth interstitial septal  thickening throughout bilateral lungs, concerning for interstitial  edema. Right lung is relatively clear. There is a 7 mm fissural  nodule along the right minor fissure, similar compared to prior  imaging and is most likely favored to represent intrapulmonary lymph  node. Otherwise no additional suspicious pulmonary nodules are noted  within limits of background parenchymal changes. No evidence of  pneumothorax.    MEDIASTINUM AND AMERICO, LOWER NECK AND AXILLA:  The visualized thyroid gland is within normal limits.  No evidence of thoracic lymphadenopathy by CT criteria.  There is mild circumferential thickening of the distal thoracic  esophagus, likely related to reflux.    HEART AND VESSELS:  Thoracic aorta is normal in course and caliber and demonstrates  moderate atherosclerotic calcifications in the arch and descending  segments. Main pulmonary artery and its branches are normal in  caliber. Dense coronary artery calcifications are noted.  Heart is mildly enlarged.Aortic root stent graft is noted.  There is no pericardial effusion seen.    UPPER ABDOMEN:  Visualized subdiaphragmatic region demonstrates hypodense lesion in  the right lobe of liver  measuring up to 1.9 cm and is similar  compared to prior CT examination. This is most likely favored to  represent a cyst. There is diffuse intrahepatic and extrahepatic  biliary ductal dilatation, similar compared to prior imaging and is  most likely favored to be related to post cholecystectomy change.  Focal hyperdensity in the superior pole of right kidney could be  related contrast in the collecting system secondary to delayed phase  versus could represent a nonobstructing calculus. This is only  partially included in the field of view and is incompletely  evaluated. Otherwise rest of the visualized upper abdomen appears  grossly unremarkable..      CHEST WALL AND OSSEOUS STRUCTURES:  Bones demonstrate extensive osseous demineralization limiting  evaluation for subtle nondisplaced fractures. Within this limitation,  there is persistent evidence of vertebral body hemangioma in T9  vertebral body, similar compared to prior CT. Chest wall soft tissues  appear grossly unremarkable.    Impression  1.  Consolidative airspace opacity in the left upper lobe with few  scattered surrounding ground-glass opacities is most likely favored  to represent pneumonia. This likely corresponds to the rounded  airspace opacity seen on immediate prior radiograph. However  recommend a short interval follow-up radiograph in 3-6 weeks to  document resolution and to exclude an underlying malignancy.  2. Moderate volume left-sided pleural effusion.  3. Mild circumferential thickening of the distal thoracic esophagus.  Recommend correlation with reflux.  4. Other stable chronic findings as described above.    MACRO:  Critical Finding:  See findings. Notification was initiated on  6/27/2024 at 1:16 pm by Dr. Ning Hansen.  (**-YCF-**)  Instructions:    Signed by: Ning Hansen 6/27/2024 1:16 PM  Dictation workstation:   [id number]       XR chest 1 view 06/29/2024    Narrative  Interpreted By:  Samantha Smith,  STUDY:  XR CHEST 1  VIEW;  6/29/2024 11:04 am    INDICATION:  Signs/Symptoms:chest tube clamping trial.    COMPARISON:  06/29/2024 at 6:48 a.m.    ACCESSION NUMBER(S):  [id number]    ORDERING CLINICIAN:  BRENDA CHARLES    FINDINGS:  CARDIOMEDIASTINAL SILHOUETTE:  Cardiomediastinal silhouette is normal in size and configuration.  There is calcification of the mitral annulus not unusual in the  elderly female population. Status post transcatheter aortic valve  repair.    LUNGS:  There is a pigtail catheter looped in the left upper hemithorax.  There is no change in size of the small left basilar pneumothorax.  This is likely an ex vacuo pneumothorax from removal of pleural  fluid. There is persistent partial volume loss in probable residual  compressive atelectasis at the left lung base    ABDOMEN:  No remarkable upper abdominal findings.      BONES:  No acute osseous changes.  There is a remote healed fracture of the proximal left humeral  diaphysis.    Impression  1. Pigtail catheter looped in the left upper hemithorax.  2. No change in size of a small left basilar ex vacuo pneumothorax    MACRO:  None    Signed by: Samantha Smith 6/29/2024 12:58 PM  Dictation workstation:   [id number]      XR chest 1 view 06/29/2024    Narrative  Interpreted By:  Samantha Smith,  STUDY:  XR CHEST 1 VIEW;  6/29/2024 7:07 am    INDICATION:  Signs/Symptoms:pneumothorax.    COMPARISON:  06/28/2024    ACCESSION NUMBER(S):  [id number]    ORDERING CLINICIAN:  BRENDA CHARLES    FINDINGS:  CARDIOMEDIASTINAL SILHOUETTE:  Cardiomediastinal silhouette is normal in size and configuration.  Status post transcatheter aortic valve repair    LUNGS:  There is a pigtail catheter looped in the left upper hemithorax.  There is an unchanged left basilar pneumothorax. This is likely an ex  vacuo pneumothorax from removal of pleural fluid. There is mild left  basilar atelectasis The right lung is clear.    ABDOMEN:  No remarkable upper abdominal  findings.      BONES:  No acute osseous changes.  There is a remote healed fracture of the proximal left humeral  diaphysis.    Impression  1. Stable chest.  2. Pigtail catheter looped in the left upper hemithorax.  3. Unchanged small left basilar pneumothorax, likely an ex vacuo  pneumothorax from left thoracentesis.  4. There is mild left basilar atelectasis.    MACRO:  None    Signed by: Samantha Smith 6/29/2024 8:15 AM  Dictation workstation:   [id number]      XR chest 1 view 06/28/2024    Narrative  Interpreted By:  Javier Giraldo,  STUDY:  XR CHEST 1 VIEW;  6/28/2024 2:16 pm    INDICATION:  Signs/Symptoms:s/p chest tube insertion. confirm placement.    COMPARISON:  06/28/2024 at 11:49 a.m.    ACCESSION NUMBER(S):  [id number]    ORDERING CLINICIAN:  BRENDA CHARLES    FINDINGS:  AP radiograph of the chest performed at 2:06 p.m. was provided.    Pigtail pleural drainage catheter has been placed on the left. There  has been reduction in the degree of pneumothorax.    CARDIOMEDIASTINAL SILHOUETTE:  Cardiomediastinal silhouette is stable in size and configuration.  Note of prior TAVR placement is again made. Atherosclerotic  calcification and tortuosity of the thoracic aorta is again noted.    LUNGS:  There is improvement in the degree of basal atelectasis on the left.  There is decreasing density of the ovoid opacity at the lateral mid  lung zone. The right lung and pleural space remain clear.    ABDOMEN:  No remarkable upper abdominal findings.    BONES:  No acute osseous changes.    Impression  1.  Satisfactory placement of pleural drainage catheter on the left  with partial reduction in the degree of left pneumothorax.        MACRO:  None    Signed by: Javier Giraldo 6/28/2024 3:12 PM  Dictation workstation:   [id number]         Assessment and Plan / Recommendations   Assessment/Plan   89 y.o. female admitted on 6/26/2024  presenting with shortness of breath found to have a large left pleural effusion.   Pleural effusion was first noticed in September 2023 and at that time was small.  There were also no apparent left-sided nodules or masses at that time.  She is feeling better after 1.3L thoracentesis on 6/26/2024, however, post-procedure chest x-ray demonstrating a left lung mass. This was followed up with CT chest which re-classified the mass as a consolidation. Pleural fluid is now consistent with a complicated parapneumonic effusion. Negative cytology. She underwent second thoracentesis on 6/28/2024 complicated by post-procedural pneumothorax. PTX was both basilar and apical which could suggest possible PTX ex vacuo. She then had pigtail catheter insertion with resolution of apical PTX, but persistence of basilar PTX which has re accumulated with pleural fluid.    #Left sided pleural effusion s/p thora   #Left apical/basilar post procedural Pneumothoraces s/p chest tube placement    Recommendations:  -Chest tube removed today.  -Recommend follow up CT chest in 2 months as outpatient    Assessment and Plan discussed with Dr. Cordero

## 2024-07-02 ENCOUNTER — HOME HEALTH ADMISSION (OUTPATIENT)
Dept: HOME HEALTH SERVICES | Facility: HOME HEALTH | Age: 89
End: 2024-07-02
Payer: MEDICARE

## 2024-07-02 ENCOUNTER — DOCUMENTATION (OUTPATIENT)
Dept: HOME HEALTH SERVICES | Facility: HOME HEALTH | Age: 89
End: 2024-07-02
Payer: MEDICARE

## 2024-07-02 ENCOUNTER — PHARMACY VISIT (OUTPATIENT)
Dept: PHARMACY | Facility: CLINIC | Age: 89
End: 2024-07-02
Payer: MEDICARE

## 2024-07-02 VITALS
RESPIRATION RATE: 18 BRPM | HEIGHT: 59 IN | BODY MASS INDEX: 24.22 KG/M2 | DIASTOLIC BLOOD PRESSURE: 43 MMHG | TEMPERATURE: 97.2 F | WEIGHT: 120.15 LBS | HEART RATE: 73 BPM | OXYGEN SATURATION: 96 % | SYSTOLIC BLOOD PRESSURE: 113 MMHG

## 2024-07-02 LAB
ALBUMIN SERPL BCP-MCNC: 3 G/DL (ref 3.4–5)
ANION GAP SERPL CALC-SCNC: 12 MMOL/L (ref 10–20)
BUN SERPL-MCNC: 23 MG/DL (ref 6–23)
CALCIUM SERPL-MCNC: 8.4 MG/DL (ref 8.6–10.3)
CHLORIDE SERPL-SCNC: 98 MMOL/L (ref 98–107)
CO2 SERPL-SCNC: 28 MMOL/L (ref 21–32)
CREAT SERPL-MCNC: 1.18 MG/DL (ref 0.5–1.05)
EGFRCR SERPLBLD CKD-EPI 2021: 44 ML/MIN/1.73M*2
ERYTHROCYTE [DISTWIDTH] IN BLOOD BY AUTOMATED COUNT: 15.2 % (ref 11.5–14.5)
GLUCOSE BLD MANUAL STRIP-MCNC: 118 MG/DL (ref 74–99)
GLUCOSE BLD MANUAL STRIP-MCNC: 132 MG/DL (ref 74–99)
GLUCOSE BLD MANUAL STRIP-MCNC: 223 MG/DL (ref 74–99)
GLUCOSE SERPL-MCNC: 161 MG/DL (ref 74–99)
HCT VFR BLD AUTO: 40.7 % (ref 36–46)
HGB BLD-MCNC: 13.1 G/DL (ref 12–16)
MAGNESIUM SERPL-MCNC: 2.19 MG/DL (ref 1.6–2.4)
MCH RBC QN AUTO: 32.8 PG (ref 26–34)
MCHC RBC AUTO-ENTMCNC: 32.2 G/DL (ref 32–36)
MCV RBC AUTO: 102 FL (ref 80–100)
NRBC BLD-RTO: 0 /100 WBCS (ref 0–0)
PHOSPHATE SERPL-MCNC: 3.9 MG/DL (ref 2.5–4.9)
PLATELET # BLD AUTO: 224 X10*3/UL (ref 150–450)
POTASSIUM SERPL-SCNC: 4.4 MMOL/L (ref 3.5–5.3)
RBC # BLD AUTO: 4 X10*6/UL (ref 4–5.2)
SODIUM SERPL-SCNC: 134 MMOL/L (ref 136–145)
WBC # BLD AUTO: 7.7 X10*3/UL (ref 4.4–11.3)

## 2024-07-02 PROCEDURE — 2500000001 HC RX 250 WO HCPCS SELF ADMINISTERED DRUGS (ALT 637 FOR MEDICARE OP)

## 2024-07-02 PROCEDURE — 99232 SBSQ HOSP IP/OBS MODERATE 35: CPT | Performed by: INTERNAL MEDICINE

## 2024-07-02 PROCEDURE — 97530 THERAPEUTIC ACTIVITIES: CPT | Mod: CQ,GP

## 2024-07-02 PROCEDURE — 94640 AIRWAY INHALATION TREATMENT: CPT

## 2024-07-02 PROCEDURE — 85027 COMPLETE CBC AUTOMATED: CPT

## 2024-07-02 PROCEDURE — 80069 RENAL FUNCTION PANEL: CPT

## 2024-07-02 PROCEDURE — RXMED WILLOW AMBULATORY MEDICATION CHARGE

## 2024-07-02 PROCEDURE — 82947 ASSAY GLUCOSE BLOOD QUANT: CPT

## 2024-07-02 PROCEDURE — 2500000002 HC RX 250 W HCPCS SELF ADMINISTERED DRUGS (ALT 637 FOR MEDICARE OP, ALT 636 FOR OP/ED)

## 2024-07-02 PROCEDURE — 94760 N-INVAS EAR/PLS OXIMETRY 1: CPT

## 2024-07-02 PROCEDURE — 97110 THERAPEUTIC EXERCISES: CPT | Mod: CQ,GP

## 2024-07-02 PROCEDURE — 2500000001 HC RX 250 WO HCPCS SELF ADMINISTERED DRUGS (ALT 637 FOR MEDICARE OP): Performed by: NURSE PRACTITIONER

## 2024-07-02 PROCEDURE — 83735 ASSAY OF MAGNESIUM: CPT

## 2024-07-02 PROCEDURE — 99239 HOSP IP/OBS DSCHRG MGMT >30: CPT | Performed by: STUDENT IN AN ORGANIZED HEALTH CARE EDUCATION/TRAINING PROGRAM

## 2024-07-02 PROCEDURE — 94668 MNPJ CHEST WALL SBSQ: CPT

## 2024-07-02 PROCEDURE — 2500000002 HC RX 250 W HCPCS SELF ADMINISTERED DRUGS (ALT 637 FOR MEDICARE OP, ALT 636 FOR OP/ED): Performed by: INTERNAL MEDICINE

## 2024-07-02 PROCEDURE — 36415 COLL VENOUS BLD VENIPUNCTURE: CPT

## 2024-07-02 RX ORDER — ACETAMINOPHEN 325 MG/1
650 TABLET ORAL EVERY 6 HOURS PRN
Qty: 30 TABLET | Refills: 0 | Status: SHIPPED | OUTPATIENT
Start: 2024-07-02 | End: 2024-08-01

## 2024-07-02 RX ORDER — LIDOCAINE 50 MG/G
1 PATCH TOPICAL DAILY
Qty: 14 PATCH | Refills: 0 | Status: SHIPPED | OUTPATIENT
Start: 2024-07-02 | End: 2024-07-16

## 2024-07-02 ASSESSMENT — COGNITIVE AND FUNCTIONAL STATUS - GENERAL
WALKING IN HOSPITAL ROOM: A LOT
WALKING IN HOSPITAL ROOM: A LITTLE
MOVING FROM LYING ON BACK TO SITTING ON SIDE OF FLAT BED WITH BEDRAILS: A LITTLE
MOBILITY SCORE: 15
STANDING UP FROM CHAIR USING ARMS: A LITTLE
MOBILITY SCORE: 17
TURNING FROM BACK TO SIDE WHILE IN FLAT BAD: A LITTLE
HELP NEEDED FOR BATHING: A LOT
CLIMB 3 TO 5 STEPS WITH RAILING: A LOT
MOVING TO AND FROM BED TO CHAIR: A LITTLE
MOVING TO AND FROM BED TO CHAIR: A LITTLE
DRESSING REGULAR LOWER BODY CLOTHING: A LOT
MOVING FROM LYING ON BACK TO SITTING ON SIDE OF FLAT BED WITH BEDRAILS: A LITTLE
DAILY ACTIVITIY SCORE: 16
PERSONAL GROOMING: A LITTLE
CLIMB 3 TO 5 STEPS WITH RAILING: A LOT
TURNING FROM BACK TO SIDE WHILE IN FLAT BAD: A LITTLE
DRESSING REGULAR UPPER BODY CLOTHING: A LITTLE
TOILETING: A LOT
STANDING UP FROM CHAIR USING ARMS: A LOT

## 2024-07-02 ASSESSMENT — PAIN SCALES - GENERAL
PAINLEVEL_OUTOF10: 0 - NO PAIN
PAINLEVEL_OUTOF10: 0 - NO PAIN

## 2024-07-02 ASSESSMENT — PAIN - FUNCTIONAL ASSESSMENT: PAIN_FUNCTIONAL_ASSESSMENT: 0-10

## 2024-07-02 NOTE — PROGRESS NOTES
Physical Therapy    Physical Therapy Treatment    Patient Name: Natalya Lira  MRN: [id number]  Today's Date: 7/2/2024  Time Calculation  Start Time: 1045  Stop Time: 1110  Time Calculation (min): 25 min    Assessment/Plan   PT Assessment  PT Assessment Results: Decreased strength, Decreased endurance, Decreased mobility  Rehab Prognosis: Good  Barriers to Discharge: None  Evaluation/Treatment Tolerance: Patient tolerated treatment well  Medical Staff Made Aware: Yes  Strengths: Ability to acquire knowledge, Support of Caregivers  Barriers to Participation: Comorbidities  End of Session Communication: Bedside nurse  Assessment Comment: Patient tolerated treatment well this date. She demo's decreased activity tolerance and requires increased time to complete tasks. Verbal cues for safety throughout session. Recommend low intensity therapy.  End of Session Patient Position: Up in chair, Alarm on (nursing aware, call light within reach)     PT Plan  Treatment/Interventions: Bed mobility, Transfer training, Endurance training, Range of motion  PT Plan: Ongoing PT  PT Frequency: 2 times per week  PT Discharge Recommendations: Low intensity level of continued care  Equipment Recommended upon Discharge: Wheeled walker  PT Recommended Transfer Status: Assist x1  PT - OK to Discharge: Yes (Per PT POC)      General Visit Information:   PT  Visit  PT Received On: 07/02/24  General  Reason for Referral: Impaired mobility, acute hypoxic respiratory failure  Referred By: Ray Frank  Past Medical History Relevant to Rehab: T2DM, HTN, HLD, HFpEF 50% ef 7/2021, GERD, hypothyroidism, rheumatoid arthritis on methotrexate, CAD s/p PCI to RCA and LAD, hx aortic stenosis s/p TAVR, CKD3, hx mild cognitive impairment, macular degeneration of eyes, hx dvt  Family/Caregiver Present: Yes (Upon arrival family in room but departed as session began.)  Prior to Session Communication: Bedside nurse  Patient Position Received: Bed, 3 rail up,  Alarm off, not on at start of session  Preferred Learning Style: verbal  General Comment: Patient pleasant and agreeable to therapy session    Subjective   Precautions:  Precautions  Medical Precautions: Fall precautions (purewick, telemetry,)  Vital Signs:       Objective   Pain:  Pain Assessment  Pain Assessment: 0-10  0-10 (Numeric) Pain Score: 0 - No pain  Cognition:  Cognition  Orientation Level: Oriented X4  Coordination:     Postural Control:  Static Sitting Balance  Static Sitting-Balance Support: Bilateral upper extremity supported, Feet supported  Static Sitting-Level of Assistance: Close supervision  Static Standing Balance  Static Standing-Balance Support: Bilateral upper extremity supported  Static Standing-Level of Assistance: Contact guard  Static Standing-Comment/Number of Minutes: verbal cues provided for posture improvement    Activity Tolerance:  Activity Tolerance  Endurance: Tolerates less than 10 min exercise, no significant change in vital signs  Treatments:  Therapeutic Exercise  Therapeutic Exercise Performed: Yes  Therapeutic Exercise Activity 1: Seated in recliner PF/DF, quad sets, and glute sets x10 each                             Bed Mobility  Bed Mobility: Yes  Bed Mobility 1  Bed Mobility 1: Supine to sitting, Scooting  Level of Assistance 1: Contact guard  Bed Mobility Comments 1: HOB elevated, use of bed rail, and patient requires increased time to complete tasks    Ambulation/Gait Training  Ambulation/Gait Training Performed: Yes  Ambulation/Gait Training 1  Surface 1: Level tile  Device 1: Rolling walker  Assistance 1: Minimum assistance  Quality of Gait 1: Narrow base of support, Diminished heel strike, Inconsistent stride length, Decreased step length, Shuffling gait  Comments/Distance (ft) 1: 6 small shuffling steps to bedside chair. Verbal cues for FWW proximity, technique, and safety.  Transfers  Transfer: Yes  Transfer 1  Transfer From 1: Sit to, Stand to, Bed to  Transfer  to 1: Stand, Sit, Chair with arms  Technique 1: Sit to stand, Stand to sit  Transfer Device 1: Walker  Transfer Level of Assistance 1: Minimum assistance  Trials/Comments 1: x2 trials. Verbal cues for technique and UE placement                          Outcome Measures:  [facility] Basic Mobility  Turning from your back to your side while in a flat bed without using bedrails: A little  Moving from lying on your back to sitting on the side of a flat bed without using bedrails: A little  Moving to and from bed to chair (including a wheelchair): A little  Standing up from a chair using your arms (e.g. wheelchair or bedside chair): A little  To walk in hospital room: A little  Climbing 3-5 steps with railing: A lot  Basic Mobility - Total Score: 17    Education Documentation  Precautions, taught by Jena Blankenship PTA at 7/2/2024 11:50 AM.  Learner: Patient  Readiness: Acceptance  Method: Explanation, Demonstration  Response: Demonstrated Understanding, Needs Reinforcement    Body Mechanics, taught by Jena Blankenship PTA at 7/2/2024 11:50 AM.  Learner: Patient  Readiness: Acceptance  Method: Explanation, Demonstration  Response: Demonstrated Understanding, Needs Reinforcement    Mobility Training, taught by Jena Blankenship PTA at 7/2/2024 11:50 AM.  Learner: Patient  Readiness: Acceptance  Method: Explanation, Demonstration  Response: Demonstrated Understanding, Needs Reinforcement    Education Comments  No comments found.        OP EDUCATION:       Encounter Problems       Encounter Problems (Active)       Balance       STG - Maintains dynamic standing balance without upper extremity support for 3 minutes independently   (Progressing)       Start:  06/27/24    Expected End:  07/11/24            STG - Maintains dynamic sitting balance with dual task without upper extremity support for 5 minutes independently  (Progressing)       Start:  06/27/24    Expected End:  07/11/24               Mobility       STG - Patient will ambulate  50' with rollator MOD I  (Progressing)       Start:  06/27/24    Expected End:  07/11/24            STG - Patient will ascend and descend five stairs with us of B rails with supervision  (Not Progressing)       Start:  06/27/24    Expected End:  07/11/24               PT Transfers       STG - Patient will perform sit to stand x4 in 30 seconds without use of UE  (Progressing)       Start:  06/27/24    Expected End:  07/11/24               Pain - Adult

## 2024-07-02 NOTE — PROGRESS NOTES
07/02/24 1205   Discharge Planning   Patient expects to be discharged to: Patient discharging today home with [facility] (PT), orders sent in [facility].   Patient Choice   Provider Choice list and CMS website (https://medicare.gov/care-compare#search) for post-acute Quality and Resource Measure Data were provided and reviewed with: Family   Patient / Family choosing to utilize agency / facility established prior to hospitalization No

## 2024-07-02 NOTE — DISCHARGE SUMMARY
Discharge Diagnosis  - Acute hypoxic respiratory failure (Multi)  - Large left pleural effusion - Improved after thoracentesis  - Acute Pneumothorax s/p chest tube - Now removed and improving  - Community Acquired Pneumonia - Resolving    Issues Requiring Follow-Up  - Follow up with thoracic surgery    Discharge Meds     Your medication list        START taking these medications        Instructions Last Dose Given Next Dose Due   acetaminophen 325 mg tablet  Commonly known as: Tylenol      Take 2 tablets (650 mg) by mouth every 6 hours if needed for mild pain (1 - 3).       lidocaine 5 % patch  Commonly known as: Lidoderm      Place 1 patch over 12 hours on the skin once daily for 14 days. Apply to painful area 12 hours per day, remove for 12 hours.       potassium chloride CR 20 mEq ER tablet  Commonly known as: Klor-Con M20  Replaces: potassium chloride CR 10 mEq ER tablet      Take 1 tablet (20 mEq) by mouth once daily. Do not crush or chew.              CHANGE how you take these medications        Instructions Last Dose Given Next Dose Due   atorvastatin 10 mg tablet  Commonly known as: Lipitor  What changed:   medication strength  how much to take      Take 1 tablet (10 mg) by mouth once daily.       Oyster Shell Calcium-Vit D3 500 mg-5 mcg (200 unit) tablet  Generic drug: calcium carbonate-vitamin D3  What changed: how much to take      Take 1 tablet by mouth 2 times a day.       pantoprazole 20 mg EC tablet  Commonly known as: ProtoNix  What changed:   medication strength  how much to take      Take 1 tablet (20 mg) by mouth once daily in the morning. Take before meals. Do not crush, chew, or split.              CONTINUE taking these medications        Instructions Last Dose Given Next Dose Due   aspirin 81 mg EC tablet           clopidogrel 75 mg tablet  Commonly known as: Plavix      Take 1 tablet (75 mg) by mouth once daily.       Cozaar 100 mg tablet  Generic drug: losartan           cranberry extract 250  mg capsule           cyanocobalamin 1,000 mcg tablet  Commonly known as: Vitamin B-12           ferrous gluconate 324 (38 Fe) mg tablet  Commonly known as: Fergon           folic acid 1 mg tablet  Commonly known as: Folvite           furosemide 20 mg tablet  Commonly known as: Lasix      Take 1 tablet (20 mg) by mouth once daily.       glimepiride 4 mg tablet  Commonly known as: Amaryl           levothyroxine 88 mcg tablet  Commonly known as: Synthroid, Levoxyl      Take 1 tablet (88 mcg) by mouth early in the morning.. Take on an empty stomach at the same time each day, either 30 to 60 minutes prior to breakfast       methotrexate 2.5 mg tablet  Commonly known as: Trexall           mirtazapine 7.5 mg tablet  Commonly known as: Remeron           Premarin vaginal cream  Generic drug: estrogens (conjugated)           Stool Softener 100 mg capsule  Generic drug: docusate sodium                  STOP taking these medications      potassium chloride CR 10 mEq ER tablet  Commonly known as: Klor-Con  Replaced by: potassium chloride CR 20 mEq ER tablet        sulfamethoxazole-trimethoprim 800-160 mg tablet  Commonly known as: Bactrim DS                  Where to Get Your Medications        These medications were sent to [facility]  [address] [facility] [address]      Hours: 9 AM to 5 PM Mon-Fri Phone: [phone]   acetaminophen 325 mg tablet  atorvastatin 10 mg tablet  clopidogrel 75 mg tablet  furosemide 20 mg tablet  levothyroxine 88 mcg tablet  lidocaine 5 % patch  Oyster Shell Calcium-Vit D3 500 mg-5 mcg (200 unit) tablet  pantoprazole 20 mg EC tablet  potassium chloride CR 20 mEq ER tablet         Test Results Pending At Discharge  Pending Labs       No current pending labs.            Hospital Course  Brief HPI:  Natalya Lira is a 89 y.o. female with T2DM, HTN, HLD, HFpEF 50% ef 7/2021, GERD, hypothyroidism, rheumatoid arthritis on methotrexate, CAD s/p PCI to RCA and LAD, hx aortic stenosis s/p  TAVR, CKD3, hx mild cognitive impairment, macular degeneration of eyes, hx dvt and took Eliquis before who presents due to shortness of breath.     Hospital Course:  ED course close patient was significant for high blood pressure of 202/122, 103 heart rate, 88% on room air which is why she was subsequently placed on 3 L.  Her BNP was elevated at 776.  Troponin were initially elevated at 79 down trended to 73.  Imaging was significant for large left-sided pleural effusion with dense left basilar airspace disease.  Patient was given IV Lasix once, hydralazine for high blood pressure, placed on azithromycin and ceftriaxone and transferred to the floor.  On floors, she was continued to be managed for concern for CAP with azithromycin and ceftriaxone.  Pulm was consulted for thoracentesis.  Patient subsequently had IR guided thoracentesis on 06/26/2024.  Cardiology was also consulted and an echo was ordered for her. It showed LVEF 50%, impaired relaxation pattern of LV diastolic filling, normal right ventricular global systolic function, LA mildly dilated, moderate left pleural effusion, moderate to severe mitral annular calcification, bioprosthetic aortic valve which exhibits normal function. CT chest was ordered for lung mass and showed consolidative airspace opacity in the left upper lobe with few scattered surrounding ground-glass opacities is most likely favored to represent pneumonia likely corresponding to the rounded airspace opacity seen on immediate prior radiograph. Also showed Moderate volume left-sided pleural effusion. D/t concern for effusion forming empyema, she had repeat thora done on 06/28/2024. Subsequently patient developed ex vacou pneumothorax. Chest tube was placed and patient was monitored inpatient. Chest tube was clamped on 06/30/2024 for next 24 hr per CT surgery recs.     On discharge patient was instructed to continue taking PO Augmentin with a stop date on July 5, 2024. She was instructed  to follow up with Pulmonology and PCP after discharge for pleural fluid cytology results and lung mass/consolidation which will need further imaging in 3 to 6 weeks for monitoring. Patient is to follow up outpatient with Thoracic Surgery upon discharge in a month.     She was hemodynamically stable for discharge on 7/2/2024.     Pertinent Physical Exam At Time of Discharge  Physical Exam  Constitutional:       Appearance: Normal appearance.   HENT:      Head: Normocephalic and atraumatic.      Mouth/Throat:      Mouth: Mucous membranes are moist.   Cardiovascular:      Rate and Rhythm: Normal rate and regular rhythm.      Heart sounds: No murmur heard.     No friction rub. No gallop.   Pulmonary:      Comments: B/l reduced lung sounds  Abdominal:      General: Abdomen is flat. Bowel sounds are normal.      Palpations: Abdomen is soft. There is no mass.      Tenderness: There is no abdominal tenderness. There is no guarding.   Musculoskeletal:         General: No tenderness. Normal range of motion.      Cervical back: Normal range of motion.      Right lower leg: Edema present.      Left lower leg: Edema present.   Neurological:      Mental Status: She is alert and oriented to person, place, and time.      Comments: But confused     Outpatient Follow-Up  Thoracic surgery      Dimitri Fulton MD    Attending Physician Attestation  I saw and evaluated the patient.  I personally obtained the key and critical portions of the history and physical exam or was physically present for key and  critical portions performed by the resident/student. I reviewed the resident/student's documentation and discussed the patient with the resident/student. I agree with the documentation as detailed in the note unless stated otherwise in this attestation. Physical exam findings as documented in attestation.     Transition of care, hospital course reviewed.     S: pt feeling well this morning. Denies CP, SOB, abdominal pain. Feels ready  for DC home.     O:  Constitutional: resting comfortably in chair, no acute distress   Eyes: making eye contact   Respiratory/Thorax: CTA bilaterally, no acute respiratory distress  Cardiovascular: regular rate and rhythm  Gastrointestinal: Nondistended, soft, non-tender  Musculoskeletal: no significant peripheral edema appreciated   Neurological: alert, answering questions appropriately, speech is clear and fluent   Psychological: Appropriate mood and behavior  Skin: Warm and dry    A/P:  CAP  Parapnuemonic effusion   Post thoracentesis PTX   Acute hypoxemic respiratory failure     S/p 7 days of abx therapy during hospital stay. CT removed 7/1, tolerated with stable CXR. Follow up with pulm and PCP. Repeat CT chest in 2 months     Discussed with Dr. Fulton and resident team   Discussed with pt    DISPO: discharge [facility]      Time spent in coordination of care 40 min     Rossana Strong DO

## 2024-07-02 NOTE — NURSING NOTE
Pt home with Granddaughter, IV removed, and discharge information reviewed. All questions answered. Pt family member asking for Pulmonology to reach out for further information. Made Pascale Nagel aware family would like to speak with her.

## 2024-07-03 ENCOUNTER — PATIENT OUTREACH (OUTPATIENT)
Dept: CARE COORDINATION | Facility: CLINIC | Age: 89
End: 2024-07-03
Payer: MEDICARE

## 2024-07-03 NOTE — PROGRESS NOTES
[facility]  Admitted 6/26/2024  Discharged 7/2/2024  Dx: Acute Hypoxic Respiratory Failure, Left Pleural Effusion, Acute Pneumothorax, Community Acquired Pneumonia    Discharged to home with [facility]    Outreach call to Phyllis (granddaughter) to support a smooth transition of care from recent admission.  Left voicemail message for patient with my contact information. CM will follow through transition period.     Queta Ayoub RN/CM   [facility]  [phone]

## 2024-07-03 NOTE — PROGRESS NOTES
"Natalya Lira is a 89 y.o. female on day 6 of admission presenting with Acute hypoxic respiratory failure (Multi).    Subjective   Feels better today,.       Objective     Physical Exam  Vitals reviewed.   Constitutional:       Appearance: Normal appearance.   HENT:      Head: Normocephalic and atraumatic.   Eyes:      Extraocular Movements: Extraocular movements intact.   Cardiovascular:      Rate and Rhythm: Normal rate and regular rhythm.      Heart sounds: Normal heart sounds.   Pulmonary:      Effort: Pulmonary effort is normal.      Breath sounds: Normal breath sounds.   Abdominal:      Palpations: Abdomen is soft.      Tenderness: There is no abdominal tenderness.   Musculoskeletal:      Cervical back: Normal range of motion.   Skin:     General: Skin is warm.   Neurological:      General: No focal deficit present.      Mental Status: She is alert and oriented to person, place, and time. Mental status is at baseline.   Psychiatric:         Mood and Affect: Mood normal.         Behavior: Behavior normal.         Last Recorded Vitals  Blood pressure (!) 113/43, pulse 73, temperature 36.2 °C (97.2 °F), temperature source Temporal, resp. rate 18, height 1.499 m (4' 11.02\"), weight 54.5 kg (120 lb 2.4 oz), SpO2 96%.  Intake/Output last 3 Shifts:  I/O last 3 completed shifts:  In: 720 (12.4 mL/kg) [P.O.:720]  Out: 300 (5.2 mL/kg) [Urine:300 (0.1 mL/kg/hr)]  Dosing Weight: 58.2 kg     Relevant Results  Results for orders placed or performed during the hospital encounter of 06/26/24 (from the past 24 hour(s))   Renal function panel   Result Value Ref Range    Glucose 161 (H) 74 - 99 mg/dL    Sodium 134 (L) 136 - 145 mmol/L    Potassium 4.4 3.5 - 5.3 mmol/L    Chloride 98 98 - 107 mmol/L    Bicarbonate 28 21 - 32 mmol/L    Anion Gap 12 10 - 20 mmol/L    Urea Nitrogen 23 6 - 23 mg/dL    Creatinine 1.18 (H) 0.50 - 1.05 mg/dL    eGFR 44 (L) >60 mL/min/1.73m*2    Calcium 8.4 (L) 8.6 - 10.3 mg/dL    Phosphorus 3.9 2.5 - " 4.9 mg/dL    Albumin 3.0 (L) 3.4 - 5.0 g/dL   Magnesium   Result Value Ref Range    Magnesium 2.19 1.60 - 2.40 mg/dL   CBC   Result Value Ref Range    WBC 7.7 4.4 - 11.3 x10*3/uL    nRBC 0.0 0.0 - 0.0 /100 WBCs    RBC 4.00 4.00 - 5.20 x10*6/uL    Hemoglobin 13.1 12.0 - 16.0 g/dL    Hematocrit 40.7 36.0 - 46.0 %     (H) 80 - 100 fL    MCH 32.8 26.0 - 34.0 pg    MCHC 32.2 32.0 - 36.0 g/dL    RDW 15.2 (H) 11.5 - 14.5 %    Platelets 224 150 - 450 x10*3/uL   POCT GLUCOSE   Result Value Ref Range    POCT Glucose 118 (H) 74 - 99 mg/dL   POCT GLUCOSE   Result Value Ref Range    POCT Glucose 223 (H) 74 - 99 mg/dL   POCT GLUCOSE   Result Value Ref Range    POCT Glucose 132 (H) 74 - 99 mg/dL        Scheduled medications    Continuous medications    PRN medications         Assessment/Plan   Active Problems:    Postprocedural pneumothorax    88 yo woman admitted w/ pleural effusion s/p thora and chest tube     Pleural effusion - likely parapneumonic fluid.  S/p thora x 2.  PTX suspect ex vacuo with trapped lung component.  S/p chest tube now removed.  Recommend repeat CT-chest in 2 months.    Pt should follow-up with me in 1 month.       Arsalan Cordero MD

## 2024-07-05 NOTE — SIGNIFICANT EVENT
Follow Up Phone Call    Outgoing phone call    Spoke to: Natalya Lira Relationship:self   Phone number: [phone]      Outcome: No answer/busy signal   No chief complaint on file.         Diagnosis:Not applicable    Unable to contact patient. Voice mailbox full.

## 2024-07-18 ENCOUNTER — PATIENT OUTREACH (OUTPATIENT)
Dept: CARE COORDINATION | Facility: CLINIC | Age: 89
End: 2024-07-18
Payer: MEDICARE

## 2024-07-18 NOTE — PROGRESS NOTES
Outreach call to patient to check in after hospital discharge to support smooth transition of care. Left voicemail message with CM name and contact number. Will continue to follow.      Queta Ayoub RN/MARGARITA  [facility]  [phone]

## 2024-08-07 ENCOUNTER — PATIENT OUTREACH (OUTPATIENT)
Dept: CARE COORDINATION | Facility: CLINIC | Age: 89
End: 2024-08-07
Payer: MEDICARE

## 2024-08-07 NOTE — PROGRESS NOTES
Outreach call to patient to check in 30 days after hospital discharge to support smooth transition of care. Left voicemail message with CM name and contact number. No additional outreach needed at this time.     CM has attempted 3 outreach calls without success. Patient has not returned CM outreach calls. CM will close case as unable to reach.    Queta Ayoub RN/CM  [facility] [place] [facility]  [phone]

## 2024-08-27 ENCOUNTER — APPOINTMENT (OUTPATIENT)
Dept: PULMONOLOGY | Facility: CLINIC | Age: 89
End: 2024-08-27
Payer: MEDICARE

## 2024-10-26 ENCOUNTER — HOSPITAL ENCOUNTER (EMERGENCY)
Facility: HOSPITAL | Age: 89
Discharge: HOME | End: 2024-10-26
Attending: EMERGENCY MEDICINE
Payer: MEDICARE

## 2024-10-26 VITALS
HEIGHT: 59 IN | WEIGHT: 114 LBS | SYSTOLIC BLOOD PRESSURE: 138 MMHG | HEART RATE: 61 BPM | OXYGEN SATURATION: 98 % | TEMPERATURE: 98.1 F | DIASTOLIC BLOOD PRESSURE: 68 MMHG | BODY MASS INDEX: 22.98 KG/M2 | RESPIRATION RATE: 12 BRPM

## 2024-10-26 DIAGNOSIS — E11.65 TYPE 2 DIABETES MELLITUS WITH HYPERGLYCEMIA, WITH LONG-TERM CURRENT USE OF INSULIN: Primary | ICD-10-CM

## 2024-10-26 DIAGNOSIS — N30.00 ACUTE CYSTITIS WITHOUT HEMATURIA: ICD-10-CM

## 2024-10-26 DIAGNOSIS — Z79.4 TYPE 2 DIABETES MELLITUS WITH HYPERGLYCEMIA, WITH LONG-TERM CURRENT USE OF INSULIN: Primary | ICD-10-CM

## 2024-10-26 LAB
ANION GAP SERPL CALC-SCNC: 16 MMOL/L (ref 10–20)
APPEARANCE UR: ABNORMAL
BACTERIA #/AREA URNS AUTO: ABNORMAL /HPF
BASOPHILS # BLD AUTO: 0.04 X10*3/UL (ref 0–0.1)
BASOPHILS NFR BLD AUTO: 0.3 %
BILIRUB UR STRIP.AUTO-MCNC: NEGATIVE MG/DL
BUN SERPL-MCNC: 24 MG/DL (ref 6–23)
CALCIUM SERPL-MCNC: 9.4 MG/DL (ref 8.6–10.3)
CHLORIDE SERPL-SCNC: 92 MMOL/L (ref 98–107)
CO2 SERPL-SCNC: 26 MMOL/L (ref 21–32)
COLOR UR: YELLOW
CREAT SERPL-MCNC: 1.42 MG/DL (ref 0.5–1.05)
EGFRCR SERPLBLD CKD-EPI 2021: 35 ML/MIN/1.73M*2
EOSINOPHIL # BLD AUTO: 0.06 X10*3/UL (ref 0–0.4)
EOSINOPHIL NFR BLD AUTO: 0.5 %
ERYTHROCYTE [DISTWIDTH] IN BLOOD BY AUTOMATED COUNT: 15.4 % (ref 11.5–14.5)
GLUCOSE BLD MANUAL STRIP-MCNC: 334 MG/DL (ref 74–99)
GLUCOSE SERPL-MCNC: 310 MG/DL (ref 74–99)
GLUCOSE UR STRIP.AUTO-MCNC: ABNORMAL MG/DL
HCT VFR BLD AUTO: 40.1 % (ref 36–46)
HGB BLD-MCNC: 13.4 G/DL (ref 12–16)
HOLD SPECIMEN: NORMAL
HYALINE CASTS #/AREA URNS AUTO: ABNORMAL /LPF
IMM GRANULOCYTES # BLD AUTO: 0.11 X10*3/UL (ref 0–0.5)
IMM GRANULOCYTES NFR BLD AUTO: 0.9 % (ref 0–0.9)
KETONES UR STRIP.AUTO-MCNC: NEGATIVE MG/DL
LEUKOCYTE ESTERASE UR QL STRIP.AUTO: ABNORMAL
LYMPHOCYTES # BLD AUTO: 1.87 X10*3/UL (ref 0.8–3)
LYMPHOCYTES NFR BLD AUTO: 15.5 %
MCH RBC QN AUTO: 32.9 PG (ref 26–34)
MCHC RBC AUTO-ENTMCNC: 33.4 G/DL (ref 32–36)
MCV RBC AUTO: 99 FL (ref 80–100)
MONOCYTES # BLD AUTO: 0.67 X10*3/UL (ref 0.05–0.8)
MONOCYTES NFR BLD AUTO: 5.6 %
MUCOUS THREADS #/AREA URNS AUTO: ABNORMAL /LPF
NEUTROPHILS # BLD AUTO: 9.29 X10*3/UL (ref 1.6–5.5)
NEUTROPHILS NFR BLD AUTO: 77.2 %
NITRITE UR QL STRIP.AUTO: NEGATIVE
NRBC BLD-RTO: 0 /100 WBCS (ref 0–0)
PH UR STRIP.AUTO: 6 [PH]
PLATELET # BLD AUTO: 184 X10*3/UL (ref 150–450)
POTASSIUM SERPL-SCNC: 4.4 MMOL/L (ref 3.5–5.3)
PROT UR STRIP.AUTO-MCNC: ABNORMAL MG/DL
RBC # BLD AUTO: 4.07 X10*6/UL (ref 4–5.2)
RBC # UR STRIP.AUTO: ABNORMAL /UL
RBC #/AREA URNS AUTO: ABNORMAL /HPF
SODIUM SERPL-SCNC: 130 MMOL/L (ref 136–145)
SP GR UR STRIP.AUTO: 1.02
SQUAMOUS #/AREA URNS AUTO: ABNORMAL /HPF
UROBILINOGEN UR STRIP.AUTO-MCNC: ABNORMAL MG/DL
WBC # BLD AUTO: 12 X10*3/UL (ref 4.4–11.3)
WBC #/AREA URNS AUTO: >50 /HPF
WBC CLUMPS #/AREA URNS AUTO: ABNORMAL /HPF

## 2024-10-26 PROCEDURE — 96365 THER/PROPH/DIAG IV INF INIT: CPT | Performed by: EMERGENCY MEDICINE

## 2024-10-26 PROCEDURE — 87086 URINE CULTURE/COLONY COUNT: CPT | Mod: GEALAB | Performed by: EMERGENCY MEDICINE

## 2024-10-26 PROCEDURE — 36415 COLL VENOUS BLD VENIPUNCTURE: CPT | Performed by: EMERGENCY MEDICINE

## 2024-10-26 PROCEDURE — 2500000004 HC RX 250 GENERAL PHARMACY W/ HCPCS (ALT 636 FOR OP/ED): Performed by: EMERGENCY MEDICINE

## 2024-10-26 PROCEDURE — 85025 COMPLETE CBC W/AUTO DIFF WBC: CPT | Performed by: EMERGENCY MEDICINE

## 2024-10-26 PROCEDURE — 80048 BASIC METABOLIC PNL TOTAL CA: CPT | Performed by: EMERGENCY MEDICINE

## 2024-10-26 PROCEDURE — 99284 EMERGENCY DEPT VISIT MOD MDM: CPT | Mod: 25 | Performed by: EMERGENCY MEDICINE

## 2024-10-26 PROCEDURE — 82947 ASSAY GLUCOSE BLOOD QUANT: CPT

## 2024-10-26 PROCEDURE — 81001 URINALYSIS AUTO W/SCOPE: CPT | Performed by: EMERGENCY MEDICINE

## 2024-10-26 RX ORDER — CEFTRIAXONE 1 G/50ML
1 INJECTION, SOLUTION INTRAVENOUS ONCE
Status: COMPLETED | OUTPATIENT
Start: 2024-10-26 | End: 2024-10-26

## 2024-10-26 RX ORDER — CEPHALEXIN 500 MG/1
500 CAPSULE ORAL 3 TIMES DAILY
Qty: 15 CAPSULE | Refills: 0 | Status: SHIPPED | OUTPATIENT
Start: 2024-10-26 | End: 2024-10-31

## 2024-10-26 RX ADMIN — CEFTRIAXONE SODIUM 1 G: 1 INJECTION, SOLUTION INTRAVENOUS at 06:18

## 2024-10-26 RX ADMIN — SODIUM CHLORIDE 500 ML: 9 INJECTION, SOLUTION INTRAVENOUS at 06:18

## 2024-10-26 ASSESSMENT — LIFESTYLE VARIABLES
EVER FELT BAD OR GUILTY ABOUT YOUR DRINKING: NO
HAVE PEOPLE ANNOYED YOU BY CRITICIZING YOUR DRINKING: NO
TOTAL SCORE: 0
EVER HAD A DRINK FIRST THING IN THE MORNING TO STEADY YOUR NERVES TO GET RID OF A HANGOVER: NO
HAVE YOU EVER FELT YOU SHOULD CUT DOWN ON YOUR DRINKING: NO

## 2024-10-26 ASSESSMENT — COLUMBIA-SUICIDE SEVERITY RATING SCALE - C-SSRS
2. HAVE YOU ACTUALLY HAD ANY THOUGHTS OF KILLING YOURSELF?: NO
6. HAVE YOU EVER DONE ANYTHING, STARTED TO DO ANYTHING, OR PREPARED TO DO ANYTHING TO END YOUR LIFE?: NO
1. IN THE PAST MONTH, HAVE YOU WISHED YOU WERE DEAD OR WISHED YOU COULD GO TO SLEEP AND NOT WAKE UP?: NO

## 2024-10-26 ASSESSMENT — PAIN - FUNCTIONAL ASSESSMENT: PAIN_FUNCTIONAL_ASSESSMENT: 0-10

## 2024-10-26 ASSESSMENT — PAIN SCALES - GENERAL: PAINLEVEL_OUTOF10: 0 - NO PAIN

## 2024-10-26 NOTE — ED PROVIDER NOTES
HPI   Chief Complaint   Patient presents with    Hyperglycemia    Urinary Frequency       Natalya is a 89-year-old woman with a history of frequent UTI and diabetes.  She lives with her granddaughter who assist her with medications.  She started having elevated sugars 350 yesterday.  She was more sleepy than normal for the past week or 2.  She often gets sleepy and has high blood sugars when her urine becomes infected.  Her granddaughter has noticed that her urine has become malodorous.  Patient denies any complaints.  No fevers chills abdominal pain nausea vomiting.  Her daughter has been giving her insulin as needed.  This been no recent falls or trauma.  Patient has been less eager to get out of bed in the mornings and has been sleeping in more according to family.  Historian is patient granddaughter and another person in the room.  EMR was also reviewed.              Patient History   Past Medical History:   Diagnosis Date    Age-related osteoporosis without current pathological fracture 05/15/2014    Postmenopausal osteoporosis    Ankylosing spondylitis of unspecified sites in spine (Multi) 07/14/2021    Ankylosing spondylitis    Cellulitis of left toe 08/29/2022    Cellulitis of toe of left foot    Encounter for screening for malignant neoplasm of colon     Encounter for screening colonoscopy    Essential (primary) hypertension 11/14/2022    Hypertension    Gastro-esophageal reflux disease without esophagitis 04/10/2017    GERD without esophagitis    Hypothyroidism, unspecified 11/14/2022    Hypothyroidism    Other conditions influencing health status     History of normal mammogram    Pain in unspecified hip 01/11/2017    Joint pain, hip    Personal history of other diseases of the circulatory system 06/02/2021    History of aortic valve stenosis    Pure hypercholesterolemia, unspecified 11/14/2022    Hypercholesterolemia    Type 2 diabetes mellitus without complications (Multi) 11/14/2022    Controlled type 2  diabetes mellitus     Past Surgical History:   Procedure Laterality Date    ANKLE SURGERY  02/14/2014    Ankle Surgery    APPENDECTOMY  02/14/2014    Appendectomy    CHOLECYSTECTOMY  02/14/2014    Cholecystectomy    OTHER SURGICAL HISTORY  03/16/2016    Open Treatment Of Fracture Of Proximal Femoral Neck    OTHER SURGICAL HISTORY  02/14/2014    Wrist Surgery    TONSILLECTOMY  02/14/2014    Tonsillectomy    TOTAL ABDOMINAL HYSTERECTOMY W/ BILATERAL SALPINGOOPHORECTOMY  02/14/2014    Total Abdominal Hysterectomy With Removal Of Both Ovaries     No family history on file.  Social History     Tobacco Use    Smoking status: Former     Types: Cigarettes    Smokeless tobacco: Never   Vaping Use    Vaping status: Never Used   Substance Use Topics    Alcohol use: Never    Drug use: Never       Physical Exam   ED Triage Vitals [10/26/24 0509]   Temperature Heart Rate Respirations BP   36.7 °C (98.1 °F) 74 16 150/77      Pulse Ox Temp src Heart Rate Source Patient Position   97 % -- -- --      BP Location FiO2 (%)     -- --       Physical Exam  Vitals reviewed.   Constitutional:       General: She is awake.      Appearance: Normal appearance.   HENT:      Head: Normocephalic.      Nose: Nose normal.   Cardiovascular:      Rate and Rhythm: Normal rate and regular rhythm.   Pulmonary:      Effort: Pulmonary effort is normal.      Breath sounds: Normal breath sounds.      Comments: Clear lungs to auscultation.  Abdominal:      Palpations: Abdomen is soft.   Musculoskeletal:      Cervical back: Normal range of motion.      Comments: Marked kyphosis of the upper back.   Skin:     General: Skin is warm.      Capillary Refill: Capillary refill takes less than 2 seconds.   Neurological:      Mental Status: She is alert.           ED Course & MDM   ED Course as of 10/26/24 0708   Sat Oct 26, 2024   0555 Patient worked up for her hyperglycemia and urinary symptoms.  UA and labs were requested patient given some gentle fluids.  Found  have a UTI.  Discussed this with the patient and family.  Patient was started on antibiotics.  Will await lab results. [RZ]   0708 Patient's blood sugar improved slightly still elevated Dr. Patient and family about the results patient be treated for urine tract infection will follow-up.  No indication for hospitalization no sign of DKA. [RZ]      ED Course User Index  [RZ] Ishmael Sawyer MD         Diagnoses as of 10/26/24 0708   Type 2 diabetes mellitus with hyperglycemia, with long-term current use of insulin   Acute cystitis without hematuria                 No data recorded     [place] Coma Scale Score: 15 (10/26/24 0521 : Shannan Diaz, DAVID)                           Medical Decision Making      Procedure  Procedures     Ishmael Sawyer MD  10/26/24 0708

## 2024-10-26 NOTE — ED TRIAGE NOTES
Patient presents to the ED with c/o hyperglycemia and urinary frequency.  Patient suffers from chronic UTI's but has not had one in the past 4 weeks.

## 2024-10-26 NOTE — ED NOTES
Patient given verbal and written discharge instructions. Patients vitals per chart. Patient discharged home with family.      Gracie Montelongo RN  10/26/24 5531

## 2024-10-27 LAB — BACTERIA UR CULT: ABNORMAL

## 2025-01-09 ENCOUNTER — APPOINTMENT (OUTPATIENT)
Dept: RADIOLOGY | Facility: HOSPITAL | Age: OVER 89
End: 2025-01-09
Payer: MEDICARE

## 2025-01-09 ENCOUNTER — HOSPITAL ENCOUNTER (INPATIENT)
Facility: HOSPITAL | Age: OVER 89
End: 2025-01-09
Attending: EMERGENCY MEDICINE | Admitting: INTERNAL MEDICINE
Payer: MEDICARE

## 2025-01-09 ENCOUNTER — APPOINTMENT (OUTPATIENT)
Dept: CARDIOLOGY | Facility: HOSPITAL | Age: OVER 89
End: 2025-01-09
Payer: MEDICARE

## 2025-01-09 DIAGNOSIS — E11.69 TYPE 2 DIABETES MELLITUS WITH OTHER SPECIFIED COMPLICATION, WITHOUT LONG-TERM CURRENT USE OF INSULIN: ICD-10-CM
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
## 2025-01-09 DIAGNOSIS — M25.552 PAIN OF LEFT HIP: ICD-10-CM

## 2025-01-09 LAB
ALBUMIN SERPL BCP-MCNC: 3.3 G/DL (ref 3.4–5)
ALP SERPL-CCNC: 70 U/L (ref 33–136)
ALT SERPL W P-5'-P-CCNC: 14 U/L (ref 7–45)
ANION GAP SERPL CALCULATED.3IONS-SCNC: 12 MMOL/L (ref 10–20)
APPEARANCE UR: ABNORMAL
AST SERPL W P-5'-P-CCNC: 23 U/L (ref 9–39)
BACTERIA #/AREA URNS AUTO: ABNORMAL /HPF
BASOPHILS # BLD AUTO: 0.02 X10*3/UL (ref 0–0.1)
BASOPHILS NFR BLD AUTO: 0.3 %
BILIRUB SERPL-MCNC: 0.6 MG/DL (ref 0–1.2)
BILIRUB UR STRIP.AUTO-MCNC: NEGATIVE MG/DL
BUN SERPL-MCNC: 28 MG/DL (ref 6–23)
CALCIUM SERPL-MCNC: 8.8 MG/DL (ref 8.6–10.3)
CHLORIDE SERPL-SCNC: 103 MMOL/L (ref 98–107)
CO2 SERPL-SCNC: 24 MMOL/L (ref 21–32)
COLOR UR: ABNORMAL
CREAT SERPL-MCNC: 1.11 MG/DL (ref 0.5–1.05)
EGFRCR SERPLBLD CKD-EPI 2021: 48 ML/MIN/1.73M*2
EOSINOPHIL # BLD AUTO: 0.1 X10*3/UL (ref 0–0.4)
EOSINOPHIL NFR BLD AUTO: 1.3 %
ERYTHROCYTE [DISTWIDTH] IN BLOOD BY AUTOMATED COUNT: 14.3 % (ref 11.5–14.5)
GLUCOSE BLD MANUAL STRIP-MCNC: 237 MG/DL (ref 74–99)
GLUCOSE SERPL-MCNC: 218 MG/DL (ref 74–99)
GLUCOSE UR STRIP.AUTO-MCNC: NORMAL MG/DL
HCT VFR BLD AUTO: 30.4 % (ref 36–46)
HGB BLD-MCNC: 10.3 G/DL (ref 12–16)
HYALINE CASTS #/AREA URNS AUTO: ABNORMAL /LPF
IMM GRANULOCYTES # BLD AUTO: 0.04 X10*3/UL (ref 0–0.5)
IMM GRANULOCYTES NFR BLD AUTO: 0.5 % (ref 0–0.9)
KETONES UR STRIP.AUTO-MCNC: NEGATIVE MG/DL
LEUKOCYTE ESTERASE UR QL STRIP.AUTO: ABNORMAL
LYMPHOCYTES # BLD AUTO: 0.93 X10*3/UL (ref 0.8–3)
LYMPHOCYTES NFR BLD AUTO: 12 %
MCH RBC QN AUTO: 32.5 PG (ref 26–34)
MCHC RBC AUTO-ENTMCNC: 33.9 G/DL (ref 32–36)
MCV RBC AUTO: 96 FL (ref 80–100)
MONOCYTES # BLD AUTO: 0.55 X10*3/UL (ref 0.05–0.8)
MONOCYTES NFR BLD AUTO: 7.1 %
MUCOUS THREADS #/AREA URNS AUTO: ABNORMAL /LPF
NEUTROPHILS # BLD AUTO: 6.12 X10*3/UL (ref 1.6–5.5)
NEUTROPHILS NFR BLD AUTO: 78.8 %
NITRITE UR QL STRIP.AUTO: NEGATIVE
NRBC BLD-RTO: 0 /100 WBCS (ref 0–0)
PH UR STRIP.AUTO: 5.5 [PH]
PLATELET # BLD AUTO: 175 X10*3/UL (ref 150–450)
POTASSIUM SERPL-SCNC: 3.3 MMOL/L (ref 3.5–5.3)
PROT SERPL-MCNC: 6.5 G/DL (ref 6.4–8.2)
PROT UR STRIP.AUTO-MCNC: ABNORMAL MG/DL
RBC # BLD AUTO: 3.17 X10*6/UL (ref 4–5.2)
RBC # UR STRIP.AUTO: ABNORMAL /UL
RBC #/AREA URNS AUTO: ABNORMAL /HPF
SODIUM SERPL-SCNC: 136 MMOL/L (ref 136–145)
SP GR UR STRIP.AUTO: 1.01
SQUAMOUS #/AREA URNS AUTO: ABNORMAL /HPF
UROBILINOGEN UR STRIP.AUTO-MCNC: NORMAL MG/DL
WBC # BLD AUTO: 7.8 X10*3/UL (ref 4.4–11.3)
WBC #/AREA URNS AUTO: ABNORMAL /HPF
WBC CLUMPS #/AREA URNS AUTO: ABNORMAL /HPF

## 2025-01-09 PROCEDURE — 70450 CT HEAD/BRAIN W/O DYE: CPT

## 2025-01-09 PROCEDURE — 72192 CT PELVIS W/O DYE: CPT | Performed by: RADIOLOGY

## 2025-01-09 PROCEDURE — 96374 THER/PROPH/DIAG INJ IV PUSH: CPT

## 2025-01-09 PROCEDURE — 71045 X-RAY EXAM CHEST 1 VIEW: CPT

## 2025-01-09 PROCEDURE — 93005 ELECTROCARDIOGRAM TRACING: CPT

## 2025-01-09 PROCEDURE — 2500000002 HC RX 250 W HCPCS SELF ADMINISTERED DRUGS (ALT 637 FOR MEDICARE OP, ALT 636 FOR OP/ED): Performed by: NURSE PRACTITIONER

## 2025-01-09 PROCEDURE — 73502 X-RAY EXAM HIP UNI 2-3 VIEWS: CPT | Mod: LT

## 2025-01-09 PROCEDURE — 85025 COMPLETE CBC W/AUTO DIFF WBC: CPT | Performed by: EMERGENCY MEDICINE

## 2025-01-09 PROCEDURE — 71045 X-RAY EXAM CHEST 1 VIEW: CPT | Performed by: RADIOLOGY

## 2025-01-09 PROCEDURE — 99285 EMERGENCY DEPT VISIT HI MDM: CPT | Mod: 25 | Performed by: EMERGENCY MEDICINE

## 2025-01-09 PROCEDURE — 2500000001 HC RX 250 WO HCPCS SELF ADMINISTERED DRUGS (ALT 637 FOR MEDICARE OP): Performed by: NURSE PRACTITIONER

## 2025-01-09 PROCEDURE — 80053 COMPREHEN METABOLIC PANEL: CPT | Performed by: EMERGENCY MEDICINE

## 2025-01-09 PROCEDURE — 72125 CT NECK SPINE W/O DYE: CPT

## 2025-01-09 PROCEDURE — 82947 ASSAY GLUCOSE BLOOD QUANT: CPT

## 2025-01-09 PROCEDURE — 81001 URINALYSIS AUTO W/SCOPE: CPT | Performed by: EMERGENCY MEDICINE

## 2025-01-09 PROCEDURE — 72125 CT NECK SPINE W/O DYE: CPT | Performed by: RADIOLOGY

## 2025-01-09 PROCEDURE — 87086 URINE CULTURE/COLONY COUNT: CPT | Mod: TRILAB | Performed by: EMERGENCY MEDICINE

## 2025-01-09 PROCEDURE — 2500000004 HC RX 250 GENERAL PHARMACY W/ HCPCS (ALT 636 FOR OP/ED): Performed by: EMERGENCY MEDICINE

## 2025-01-09 PROCEDURE — 72192 CT PELVIS W/O DYE: CPT

## 2025-01-09 PROCEDURE — 70450 CT HEAD/BRAIN W/O DYE: CPT | Performed by: RADIOLOGY

## 2025-01-09 PROCEDURE — 99223 1ST HOSP IP/OBS HIGH 75: CPT | Performed by: INTERNAL MEDICINE

## 2025-01-09 PROCEDURE — 99223 1ST HOSP IP/OBS HIGH 75: CPT | Performed by: NURSE PRACTITIONER

## 2025-01-09 PROCEDURE — 1100000001 HC PRIVATE ROOM DAILY

## 2025-01-09 PROCEDURE — 93010 ELECTROCARDIOGRAM REPORT: CPT | Performed by: INTERNAL MEDICINE

## 2025-01-09 PROCEDURE — 36415 COLL VENOUS BLD VENIPUNCTURE: CPT | Performed by: EMERGENCY MEDICINE

## 2025-01-09 PROCEDURE — 73502 X-RAY EXAM HIP UNI 2-3 VIEWS: CPT | Mod: LEFT SIDE | Performed by: RADIOLOGY

## 2025-01-09 RX ORDER — DEXTROSE 50 % IN WATER (D50W) INTRAVENOUS SYRINGE
12.5
Status: DISCONTINUED | OUTPATIENT
Start: 2025-01-09 | End: 2025-01-14 | Stop reason: HOSPADM

## 2025-01-09 RX ORDER — FENTANYL CITRATE 50 UG/ML
25 INJECTION, SOLUTION INTRAMUSCULAR; INTRAVENOUS ONCE
Status: COMPLETED | OUTPATIENT
Start: 2025-01-09 | End: 2025-01-09

## 2025-01-09 RX ORDER — LEVOTHYROXINE SODIUM 88 UG/1
88 TABLET ORAL DAILY
Status: DISCONTINUED | OUTPATIENT
Start: 2025-01-09 | End: 2025-01-14 | Stop reason: HOSPADM

## 2025-01-09 RX ORDER — CEFTRIAXONE 1 G/50ML
1 INJECTION, SOLUTION INTRAVENOUS EVERY 24 HOURS
Status: DISCONTINUED | OUTPATIENT
Start: 2025-01-10 | End: 2025-01-14 | Stop reason: HOSPADM

## 2025-01-09 RX ORDER — CLOPIDOGREL BISULFATE 75 MG/1
75 TABLET ORAL DAILY
Status: DISCONTINUED | OUTPATIENT
Start: 2025-01-09 | End: 2025-01-14 | Stop reason: HOSPADM

## 2025-01-09 RX ORDER — AMLODIPINE BESYLATE 5 MG/1
5 TABLET ORAL DAILY
Status: DISCONTINUED | OUTPATIENT
Start: 2025-01-09 | End: 2025-01-14 | Stop reason: HOSPADM

## 2025-01-09 RX ORDER — CEFTRIAXONE 1 G/50ML
1 INJECTION, SOLUTION INTRAVENOUS ONCE
Status: COMPLETED | OUTPATIENT
Start: 2025-01-09 | End: 2025-01-09

## 2025-01-09 RX ORDER — ATORVASTATIN CALCIUM 80 MG/1
80 TABLET, FILM COATED ORAL NIGHTLY
Status: DISCONTINUED | OUTPATIENT
Start: 2025-01-09 | End: 2025-01-14 | Stop reason: HOSPADM

## 2025-01-09 RX ORDER — FLASH GLUCOSE SENSOR
1 KIT MISCELLANEOUS
COMMUNITY
Start: 2024-10-22

## 2025-01-09 RX ORDER — INSULIN GLARGINE 100 [IU]/ML
10 INJECTION, SOLUTION SUBCUTANEOUS EVERY 24 HOURS
Status: DISCONTINUED | OUTPATIENT
Start: 2025-01-09 | End: 2025-01-09

## 2025-01-09 RX ORDER — INSULIN LISPRO 100 [IU]/ML
0-10 INJECTION, SOLUTION INTRAVENOUS; SUBCUTANEOUS
Status: DISCONTINUED | OUTPATIENT
Start: 2025-01-10 | End: 2025-01-14 | Stop reason: HOSPADM

## 2025-01-09 RX ORDER — PANTOPRAZOLE SODIUM 20 MG/1
20 TABLET, DELAYED RELEASE ORAL
Status: DISCONTINUED | OUTPATIENT
Start: 2025-01-10 | End: 2025-01-14 | Stop reason: HOSPADM

## 2025-01-09 RX ORDER — ATORVASTATIN CALCIUM 80 MG/1
80 TABLET, FILM COATED ORAL DAILY
COMMUNITY

## 2025-01-09 RX ORDER — ACETAMINOPHEN 325 MG/1
325 TABLET ORAL 2 TIMES DAILY
COMMUNITY

## 2025-01-09 RX ORDER — DEXTROSE 50 % IN WATER (D50W) INTRAVENOUS SYRINGE
25
Status: DISCONTINUED | OUTPATIENT
Start: 2025-01-09 | End: 2025-01-14 | Stop reason: HOSPADM

## 2025-01-09 RX ORDER — INSULIN DETEMIR 100 [IU]/ML
10 INJECTION, SOLUTION SUBCUTANEOUS NIGHTLY
COMMUNITY
Start: 2024-07-24

## 2025-01-09 RX ORDER — AMLODIPINE BESYLATE 5 MG/1
5 TABLET ORAL DAILY
COMMUNITY
Start: 2024-12-02

## 2025-01-09 RX ORDER — INSULIN GLARGINE 100 [IU]/ML
10 INJECTION, SOLUTION SUBCUTANEOUS NIGHTLY
Status: DISCONTINUED | OUTPATIENT
Start: 2025-01-09 | End: 2025-01-14 | Stop reason: HOSPADM

## 2025-01-09 RX ORDER — ASPIRIN 81 MG/1
81 TABLET ORAL DAILY
Status: DISCONTINUED | OUTPATIENT
Start: 2025-01-09 | End: 2025-01-14 | Stop reason: HOSPADM

## 2025-01-09 RX ADMIN — INSULIN GLARGINE 10 UNITS: 100 INJECTION, SOLUTION SUBCUTANEOUS at 21:11

## 2025-01-09 RX ADMIN — ATORVASTATIN CALCIUM 80 MG: 80 TABLET, FILM COATED ORAL at 21:15

## 2025-01-09 RX ADMIN — ASPIRIN 81 MG: 81 TABLET, COATED ORAL at 16:34

## 2025-01-09 RX ADMIN — CEFTRIAXONE SODIUM 1 G: 1 INJECTION, SOLUTION INTRAVENOUS at 12:36

## 2025-01-09 RX ADMIN — LEVOTHYROXINE SODIUM 88 MCG: 88 TABLET ORAL at 16:35

## 2025-01-09 RX ADMIN — CLOPIDOGREL BISULFATE 75 MG: 75 TABLET ORAL at 16:35

## 2025-01-09 RX ADMIN — AMLODIPINE BESYLATE 5 MG: 5 TABLET ORAL at 16:35

## 2025-01-09 RX ADMIN — FENTANYL CITRATE 25 MCG: 50 INJECTION INTRAMUSCULAR; INTRAVENOUS at 08:00

## 2025-01-09 SDOH — ECONOMIC STABILITY: FOOD INSECURITY: HOW HARD IS IT FOR YOU TO PAY FOR THE VERY BASICS LIKE FOOD, HOUSING, MEDICAL CARE, AND HEATING?: NOT VERY HARD

## 2025-01-09 SDOH — SOCIAL STABILITY: SOCIAL INSECURITY
WITHIN THE LAST YEAR, HAVE YOU BEEN KICKED, HIT, SLAPPED, OR OTHERWISE PHYSICALLY HURT BY YOUR PARTNER OR EX-PARTNER?: NO

## 2025-01-09 SDOH — HEALTH STABILITY: PHYSICAL HEALTH
HOW OFTEN DO YOU NEED TO HAVE SOMEONE HELP YOU WHEN YOU READ INSTRUCTIONS, PAMPHLETS, OR OTHER WRITTEN MATERIAL FROM YOUR DOCTOR OR PHARMACY?: NEVER

## 2025-01-09 SDOH — ECONOMIC STABILITY: FOOD INSECURITY: WITHIN THE PAST 12 MONTHS, THE FOOD YOU BOUGHT JUST DIDN'T LAST AND YOU DIDN'T HAVE MONEY TO GET MORE.: NEVER TRUE

## 2025-01-09 SDOH — SOCIAL STABILITY: SOCIAL INSECURITY: WITHIN THE LAST YEAR, HAVE YOU BEEN HUMILIATED OR EMOTIONALLY ABUSED IN OTHER WAYS BY YOUR PARTNER OR EX-PARTNER?: NO

## 2025-01-09 SDOH — SOCIAL STABILITY: SOCIAL INSECURITY
WITHIN THE LAST YEAR, HAVE YOU BEEN RAPED OR FORCED TO HAVE ANY KIND OF SEXUAL ACTIVITY BY YOUR PARTNER OR EX-PARTNER?: NO

## 2025-01-09 SDOH — ECONOMIC STABILITY: FOOD INSECURITY: WITHIN THE PAST 12 MONTHS, YOU WORRIED THAT YOUR FOOD WOULD RUN OUT BEFORE YOU GOT THE MONEY TO BUY MORE.: NEVER TRUE

## 2025-01-09 SDOH — ECONOMIC STABILITY: TRANSPORTATION INSECURITY: IN THE PAST 12 MONTHS, HAS LACK OF TRANSPORTATION KEPT YOU FROM MEDICAL APPOINTMENTS OR FROM GETTING MEDICATIONS?: NO

## 2025-01-09 SDOH — SOCIAL STABILITY: SOCIAL INSECURITY: WITHIN THE LAST YEAR, HAVE YOU BEEN AFRAID OF YOUR PARTNER OR EX-PARTNER?: NO

## 2025-01-09 SDOH — ECONOMIC STABILITY: HOUSING INSECURITY: IN THE LAST 12 MONTHS, WAS THERE A TIME WHEN YOU WERE NOT ABLE TO PAY THE MORTGAGE OR RENT ON TIME?: NO

## 2025-01-09 SDOH — ECONOMIC STABILITY: INCOME INSECURITY: IN THE PAST 12 MONTHS HAS THE ELECTRIC, GAS, OIL, OR WATER COMPANY THREATENED TO SHUT OFF SERVICES IN YOUR HOME?: NO

## 2025-01-09 SDOH — SOCIAL STABILITY: SOCIAL INSECURITY: ARE THERE ANY APPARENT SIGNS OF INJURIES/BEHAVIORS THAT COULD BE RELATED TO ABUSE/NEGLECT?: NO

## 2025-01-09 SDOH — ECONOMIC STABILITY: HOUSING INSECURITY: IN THE PAST 12 MONTHS, HOW MANY TIMES HAVE YOU MOVED WHERE YOU WERE LIVING?: 0

## 2025-01-09 SDOH — ECONOMIC STABILITY: HOUSING INSECURITY: AT ANY TIME IN THE PAST 12 MONTHS, WERE YOU HOMELESS OR LIVING IN A SHELTER (INCLUDING NOW)?: NO

## 2025-01-09 SDOH — SOCIAL STABILITY: SOCIAL INSECURITY: HAVE YOU HAD THOUGHTS OF HARMING ANYONE ELSE?: NO

## 2025-01-09 SDOH — SOCIAL STABILITY: SOCIAL INSECURITY: DO YOU FEEL UNSAFE GOING BACK TO THE PLACE WHERE YOU ARE LIVING?: NO

## 2025-01-09 SDOH — SOCIAL STABILITY: SOCIAL INSECURITY: HAS ANYONE EVER THREATENED TO HURT YOUR FAMILY OR YOUR PETS?: NO

## 2025-01-09 SDOH — SOCIAL STABILITY: SOCIAL INSECURITY: HAVE YOU HAD ANY THOUGHTS OF HARMING ANYONE ELSE?: NO

## 2025-01-09 SDOH — SOCIAL STABILITY: SOCIAL INSECURITY: DOES ANYONE TRY TO KEEP YOU FROM HAVING/CONTACTING OTHER FRIENDS OR DOING THINGS OUTSIDE YOUR HOME?: NO

## 2025-01-09 SDOH — SOCIAL STABILITY: SOCIAL INSECURITY: ARE YOU OR HAVE YOU BEEN THREATENED OR ABUSED PHYSICALLY, EMOTIONALLY, OR SEXUALLY BY ANYONE?: NO

## 2025-01-09 SDOH — SOCIAL STABILITY: SOCIAL INSECURITY: ABUSE: ADULT

## 2025-01-09 SDOH — SOCIAL STABILITY: SOCIAL INSECURITY: WERE YOU ABLE TO COMPLETE ALL THE BEHAVIORAL HEALTH SCREENINGS?: YES

## 2025-01-09 SDOH — SOCIAL STABILITY: SOCIAL INSECURITY: DO YOU FEEL ANYONE HAS EXPLOITED OR TAKEN ADVANTAGE OF YOU FINANCIALLY OR OF YOUR PERSONAL PROPERTY?: NO

## 2025-01-09 ASSESSMENT — COLUMBIA-SUICIDE SEVERITY RATING SCALE - C-SSRS
6. HAVE YOU EVER DONE ANYTHING, STARTED TO DO ANYTHING, OR PREPARED TO DO ANYTHING TO END YOUR LIFE?: NO
1. IN THE PAST MONTH, HAVE YOU WISHED YOU WERE DEAD OR WISHED YOU COULD GO TO SLEEP AND NOT WAKE UP?: NO
2. HAVE YOU ACTUALLY HAD ANY THOUGHTS OF KILLING YOURSELF?: NO

## 2025-01-09 ASSESSMENT — LIFESTYLE VARIABLES
HOW MANY STANDARD DRINKS CONTAINING ALCOHOL DO YOU HAVE ON A TYPICAL DAY: PATIENT DOES NOT DRINK
AUDIT-C TOTAL SCORE: 0
SUBSTANCE_ABUSE_PAST_12_MONTHS: NO
SKIP TO QUESTIONS 9-10: 1
PRESCIPTION_ABUSE_PAST_12_MONTHS: NO
AUDIT-C TOTAL SCORE: 0
HOW OFTEN DO YOU HAVE 6 OR MORE DRINKS ON ONE OCCASION: NEVER
HOW OFTEN DO YOU HAVE A DRINK CONTAINING ALCOHOL: NEVER

## 2025-01-09 ASSESSMENT — ACTIVITIES OF DAILY LIVING (ADL)
WALKS IN HOME: NEEDS ASSISTANCE
HEARING - LEFT EAR: FUNCTIONAL
LACK_OF_TRANSPORTATION: NO
JUDGMENT_ADEQUATE_SAFELY_COMPLETE_DAILY_ACTIVITIES: YES
HEARING - RIGHT EAR: FUNCTIONAL
ADEQUATE_TO_COMPLETE_ADL: YES
FEEDING YOURSELF: INDEPENDENT
BATHING: NEEDS ASSISTANCE
GROOMING: NEEDS ASSISTANCE
LACK_OF_TRANSPORTATION: NO
TOILETING: INDEPENDENT
PATIENT'S MEMORY ADEQUATE TO SAFELY COMPLETE DAILY ACTIVITIES?: YES
DRESSING YOURSELF: NEEDS ASSISTANCE

## 2025-01-09 ASSESSMENT — COGNITIVE AND FUNCTIONAL STATUS - GENERAL
MOVING FROM LYING ON BACK TO SITTING ON SIDE OF FLAT BED WITH BEDRAILS: A LITTLE
MOBILITY SCORE: 21
WALKING IN HOSPITAL ROOM: A LITTLE
TOILETING: A LITTLE
MOVING TO AND FROM BED TO CHAIR: A LOT
MOBILITY SCORE: 14
PATIENT BASELINE BEDBOUND: NO
DAILY ACTIVITIY SCORE: 23
DRESSING REGULAR LOWER BODY CLOTHING: A LITTLE
CLIMB 3 TO 5 STEPS WITH RAILING: A LOT
DAILY ACTIVITIY SCORE: 20
TURNING FROM BACK TO SIDE WHILE IN FLAT BAD: A LITTLE
WALKING IN HOSPITAL ROOM: A LOT
DRESSING REGULAR LOWER BODY CLOTHING: A LITTLE
HELP NEEDED FOR BATHING: A LITTLE
DRESSING REGULAR UPPER BODY CLOTHING: A LITTLE
CLIMB 3 TO 5 STEPS WITH RAILING: A LOT
STANDING UP FROM CHAIR USING ARMS: A LOT

## 2025-01-09 ASSESSMENT — PAIN SCALES - GENERAL
PAINLEVEL_OUTOF10: 0 - NO PAIN
PAINLEVEL_OUTOF10: 8
PAINLEVEL_OUTOF10: 8

## 2025-01-09 ASSESSMENT — PAIN - FUNCTIONAL ASSESSMENT
PAIN_FUNCTIONAL_ASSESSMENT: 0-10
PAIN_FUNCTIONAL_ASSESSMENT: 0-10

## 2025-01-09 NOTE — SIGNIFICANT EVENT
I personally saw and examined the Patient.   Discussed with Leatha Joe CNP.  Impression:  UTI  Mechanical fall CRF  Dehydration.  Cont. Ab, monitor culture  PT/OT eval.

## 2025-01-09 NOTE — PROGRESS NOTES
01/09/25 1422   Discharge Planning   Living Arrangements Family members   Support Systems Family members   Type of Residence Private residence   Number of Stairs to Enter Residence 5   Number of Stairs Within Residence 0   Do you have animals or pets at home? No   Who is requesting discharge planning? Provider   Home or Post Acute Services In home services   Type of Home Care Services Hospice   Expected Discharge Disposition SNF   Does the patient need discharge transport arranged? Yes   RoundTrip coordination needed? Yes   Has discharge transport been arranged? No   Financial Resource Strain   How hard is it for you to pay for the very basics like food, housing, medical care, and heating? Not very   Housing Stability   In the last 12 months, was there a time when you were not able to pay the mortgage or rent on time? N   In the past 12 months, how many times have you moved where you were living? 0   At any time in the past 12 months, were you homeless or living in a shelter (including now)? N   Transportation Needs   In the past 12 months, has lack of transportation kept you from medical appointments or from getting medications? no   In the past 12 months, has lack of transportation kept you from meetings, work, or from getting things needed for daily living? No   Patient Choice   Provider Choice list and CMS website (https://medicare.gov/care-compare#search) for post-acute Quality and Resource Measure Data were provided and reviewed with: Family   Patient / Family choosing to utilize agency / facility established prior to hospitalization No   Stroke Family Assessment   Stroke Family Assessment Needed No   Intensity of Service   Intensity of Service >30 min     MSW met with patient at bedside. She lives at home, and granddaughter, Phyllis lives with her. She reports she gets around with a walker, and granddaughter helps maintain the home. Patient reports she is active with hospice, but uncertain of agency. She  is agreeable to SNF but uncertain of which she would want to go to upon discharge. She is agreeable for MSW to call and speak with her granddaughter.     2:20 PM MSW called patient's granddaughter, Phyllis, at this time who confirmed the above. She was also able to advise that patient is active with [facility]. She is aware and agreeable hospice will be placed on hold while patient is actively receiving treatment, as plan after hospitalization per patient and granddaughter is SNF. SNF list sent to granddaughter at this time. She will review with family and advise of choices.     MSW notified [facility] of patient's admission and plan for SNF.     2:59 PM  Received a return call from Trinity with [facility]. She was updated to patient's admission and the above plan.     Care Transitions will follow.

## 2025-01-09 NOTE — CARE PLAN
Problem: Diabetes  Goal: Achieve decreasing blood glucose levels by end of shift  Outcome: Progressing  Goal: Increase stability of blood glucose readings by end of shift  Outcome: Progressing  Goal: Decrease in ketones present in urine by end of shift  Outcome: Progressing  Goal: Maintain electrolyte levels within acceptable range throughout shift  Outcome: Progressing  Goal: Maintain glucose levels >70mg/dl to <250mg/dl throughout shift  Outcome: Progressing  Goal: No changes in neurological exam by end of shift  Outcome: Progressing  Goal: Learn about and adhere to nutrition recommendations by end of shift  Outcome: Progressing  Goal: Vital signs within normal range for age by end of shift  Outcome: Progressing  Goal: Increase self care and/or family involovement by end of shift  Outcome: Progressing  Goal: Receive DSME education by end of shift  Outcome: Progressing   The patient's goals for the shift include      The clinical goals for the shift include feel better    Over the shift, the patient did not make progress toward the following goals. Barriers to progression include . Recommendations to address these barriers include .

## 2025-01-09 NOTE — H&P
"History Of Present Illness  Natalya Lira is a 89 y.o. female presenting with a fall.  Patient lives at home with her granddaughter.  Granddaughter said she went to check on the patient this morning around 6 AM and the patient was found on the ground.  Patient is unable to recall what happened although she tells me she thinks she \"rolled out of bed \".  She has a hematoma to the LT side of a scalp.       Past Medical History  Past Medical History:   Diagnosis Date    Age-related osteoporosis without current pathological fracture 05/15/2014    Postmenopausal osteoporosis    Ankylosing spondylitis of unspecified sites in spine (Multi) 07/14/2021    Ankylosing spondylitis    Cellulitis of left toe 08/29/2022    Cellulitis of toe of left foot    Encounter for screening for malignant neoplasm of colon     Encounter for screening colonoscopy    Essential (primary) hypertension 11/14/2022    Hypertension    Gastro-esophageal reflux disease without esophagitis 04/10/2017    GERD without esophagitis    Hypothyroidism, unspecified 11/14/2022    Hypothyroidism    Other conditions influencing health status     History of normal mammogram    Pain in unspecified hip 01/11/2017    Joint pain, hip    Personal history of other diseases of the circulatory system 06/02/2021    History of aortic valve stenosis    Pure hypercholesterolemia, unspecified 11/14/2022    Hypercholesterolemia    Type 2 diabetes mellitus without complications (Multi) 11/14/2022    Controlled type 2 diabetes mellitus       Surgical History  Past Surgical History:   Procedure Laterality Date    ANKLE SURGERY  02/14/2014    Ankle Surgery    APPENDECTOMY  02/14/2014    Appendectomy    CHOLECYSTECTOMY  02/14/2014    Cholecystectomy    OTHER SURGICAL HISTORY  03/16/2016    Open Treatment Of Fracture Of Proximal Femoral Neck    OTHER SURGICAL HISTORY  02/14/2014    Wrist Surgery    TONSILLECTOMY  02/14/2014    Tonsillectomy    TOTAL ABDOMINAL HYSTERECTOMY W/ BILATERAL " "SALPINGOOPHORECTOMY  02/14/2014    Total Abdominal Hysterectomy With Removal Of Both Ovaries        Social History  She reports that she has quit smoking. Her smoking use included cigarettes. She has never used smokeless tobacco. She reports that she does not drink alcohol and does not use drugs.    Family History  No family history on file.     Allergies  Patient has no known allergies.    Review of Systems   All other systems reviewed and are negative.       Physical Exam  Constitutional:       General: She is not in acute distress.     Appearance: She is not ill-appearing or toxic-appearing.   Cardiovascular:      Rate and Rhythm: Normal rate and regular rhythm.      Pulses: Normal pulses.      Heart sounds: Normal heart sounds.   Pulmonary:      Effort: Pulmonary effort is normal. No respiratory distress.      Breath sounds: Normal breath sounds. No wheezing or rales.   Abdominal:      General: Bowel sounds are normal.      Palpations: Abdomen is soft.   Musculoskeletal:      Right lower leg: Edema present.      Left lower leg: Edema present.   Neurological:      General: No focal deficit present.      Mental Status: She is alert and oriented to person, place, and time.          Last Recorded Vitals  Blood pressure 120/81, pulse 63, temperature 36.9 °C (98.4 °F), temperature source Oral, resp. rate 17, height 1.499 m (4' 11\"), weight 54.7 kg (120 lb 9.5 oz), SpO2 97%.    Relevant Results  Scheduled medications  amLODIPine, 5 mg, oral, Daily  aspirin, 81 mg, oral, Daily  atorvastatin, 80 mg, oral, Nightly  [START ON 1/10/2025] cefTRIAXone, 1 g, intravenous, q24h  clopidogrel, 75 mg, oral, Daily  levothyroxine, 88 mcg, oral, Daily  [START ON 1/10/2025] pantoprazole, 20 mg, oral, Daily before breakfast      Continuous medications     PRN medications       following data reviewed    WBC- 7.8  Hgb-10.3  Hct-30.4  Platelets-175    AST-23  ALT- 14  Alk Phos-70  Total " Kurt-0.6    Na-136  K+-3.3  Cl-103  Bicarb-24  BUN-28  creatinine-1.1         LT hip xray  Impression:     Orthopedic hardware in the osseous pelvis and bilateral hips limits  evaluation however no definite acute displaced osseous pelvic or  bilateral hip fracture identified. Heterogeneity and probable remote  fracture deformity of the osseous pelvis and both hips including  remote ununited fracture of the left superior pubic ramus.      Severe arthritic changes of the left hip.      Small air-fluid level in the urinary bladder incidentally noted.  Correlate with possible recent instrumentation versus gas-forming  infection.       CT brain   Impression:     No acute intracranial hemorrhage or mass-effect.      Mild left frontal scalp soft tissue swelling/contusion.        Assessment/Plan     Fall  -fall precautions  -PT,OT  -will likely need SNF  -CT brain no acute findings      DM II  -monitor blood glucose  -diabetic diet  -ISS  -continue levemir  -Hgb A1C 10.0 (11/2024)    Hypothyroid  -continue levothyroxine    Recent CVA (11/2024)  -neurology consult  -neuro checks  -continue asa, plavix, statin    HTN  -monitor blood pressure  -continue amlodipine      UTI  -IV ceftriaxone  -urine cx pending     Plan   Above plan discussed at length with pt and granddaughter at the bedside  Code status discussed and  pt  wishes to be DNRCCA/DNI, ICU okay  Above case and plan  discussed with collaborating physician            I spent 80 minutes in the professional and overall care of this patient.      Leatha Arnett, APRN-CNP

## 2025-01-09 NOTE — PROGRESS NOTES
Pharmacy Medication History Review    Natalya Lira is a 89 y.o. female admitted for a fall. Pharmacy reviewed the patient's [id number] medications and allergies for accuracy.    Medications ADDED:  Acetaminophen   Medications CHANGED:  Atorvastatin 10 mg - Atorvastatin 80 mg  Medications REMOVED:   N/A    The list below reflects the updated PTA list.   Prior to Admission Medications   Prescriptions Last Dose Informant Patient Reported? Taking?   FreeStyle Kamar 2 Sensor kit   Yes Yes   Sig: Inject 1 each under the skin every 14 (fourteen) days.   Premarin vaginal cream Unknown  Yes No   Sig: Insert 0.5 g into the vagina once daily.   Patient taking differently: Insert 0.5 g into the vagina if needed.   Stool Softener 100 mg capsule 1/8/2025 Morning  Yes Yes   Sig: Take 1 capsule (100 mg) by mouth 2 times a day.   acetaminophen (Tylenol) 325 mg tablet 1/8/2025 Bedtime  Yes Yes   Sig: Take 1 tablet (325 mg) by mouth 2 times a day.   amLODIPine (Norvasc) 5 mg tablet 1/8/2025 Morning  Yes Yes   Sig: Take 1 tablet (5 mg) by mouth once daily.   aspirin 81 mg EC tablet 1/8/2025 Morning  Yes Yes   Sig: Take 1 tablet (81 mg) by mouth once daily.   atorvastatin (Lipitor) 10 mg tablet   No No   Sig: Take 1 tablet (10 mg) by mouth once daily.   atorvastatin (Lipitor) 80 mg tablet 1/8/2025 Morning  Yes Yes   Sig: Take 1 tablet (80 mg) by mouth once daily.   calcium carbonate-vitamin D3 500 mg-5 mcg (200 unit) tablet 1/8/2025 Morning  No Yes   Sig: Take 1 tablet by mouth 2 times a day.   clopidogrel (Plavix) 75 mg tablet Past Week  No Yes   Sig: Take 1 tablet (75 mg) by mouth once daily.   cranberry fruit extract (cranberry extract) 250 mg capsule Unknown  Yes No   Sig: Take 250 mg by mouth once daily.   cyanocobalamin (Vitamin B-12) 1,000 mcg tablet 1/8/2025 Morning  Yes Yes   Sig: Take 1 tablet (1,000 mcg) by mouth once daily.   ferrous gluconate (Fergon) 324 (38 Fe) mg tablet 1/8/2025 Morning  Yes Yes   Sig: Take 1  tablet (324 mg) by mouth early in the morning..   folic acid (Folvite) 1 mg tablet 1/8/2025 Morning  Yes Yes   Sig: Take 1 tablet (1 mg) by mouth once daily.   furosemide (Lasix) 20 mg tablet 1/8/2025 Morning  No Yes   Sig: Take 1 tablet (20 mg) by mouth once daily.   glimepiride (Amaryl) 4 mg tablet 1/8/2025 Morning  Yes Yes   Sig: Take 1 tablet (4 mg) by mouth 2 times a day.   Patient taking differently: Take 1 tablet (4 mg) by mouth once daily in the morning. Take before meals.   insulin detemir (Levemir FlexPen) 100 unit/mL (3 mL) pen Past Week Morning  Yes Yes   Sig: Inject 10 Units under the skin once daily at bedtime.   levothyroxine (Synthroid, Levoxyl) 88 mcg tablet 1/8/2025 Morning  No Yes   Sig: Take 1 tablet (88 mcg) by mouth early in the morning.. Take on an empty stomach at the same time each day, either 30 to 60 minutes prior to breakfast   losartan (Cozaar) 100 mg tablet 1/8/2025 Morning  Yes Yes   Sig: Take 1 tablet (100 mg) by mouth once daily.   methotrexate (Trexall) 2.5 mg tablet 1/3/2025  Yes Yes   Sig: Take 4 tablets (10 mg total) by mouth 1 (one) time per week.  Follow directions carefully, and ask to explain any part you do not understand. Take exactly as directed.   mirtazapine (Remeron) 7.5 mg tablet 1/8/2025 Bedtime  Yes Yes   Sig: Take 1 tablet (7.5 mg) by mouth once daily at bedtime.   pantoprazole (ProtoNix) 20 mg EC tablet 1/8/2025 Morning  No Yes   Sig: Take 1 tablet (20 mg) by mouth once daily in the morning. Take before meals. Do not crush, chew, or split.   potassium chloride CR (Klor-Con M20) 20 mEq ER tablet 1/8/2025 Morning  No Yes   Sig: Take 1 tablet (20 mEq) by mouth once daily. Do not crush or chew.      Facility-Administered Medications: None        The list below reflects the updated allergy list. Please review each documented allergy for additional clarification and justification.  Allergies  Reviewed by Portillo Swan on 1/9/2025   No Known Allergies         Patient  accepts M2B at discharge.     Sources:   Pharmacy dispense history  Caregiver     Additional Comments:  Primary source for med rec was from patients Mychart with updated doses and current meds      ULatrice Swan  Pharmacy Technician  01/09/25     Secure Chat preferred

## 2025-01-09 NOTE — ED PROVIDER NOTES
EMERGENCY DEPARTMENT ENCOUNTER      Pt Name: Natalya Lira  MRN: [id number]  Birthdate 1935  Date of evaluation: 1/9/2025  ED Provider: Tamara Hernandez DO     CHIEF COMPLAINT       Chief Complaint   Patient presents with    Fall     Pt states she must have fallen out of bed and is now having left hip pain. Pt was put in pelvic binder by EMS.        HISTORY OF PRESENT ILLNESS    Natalya Lira is a 89 y.o. who presents to the emergency department via EMS with left hip pain after a fall.  She was in her previous state of health when she reportedly rolled out of bed.  She cannot fully recall what happened.  She landed on the left side and is now complaining of left hip pain.  She is uncertain if she hit her head.  No other acute complaints.  She is on Plavix.    REVIEW OF SYSTEMS     Focused ROS performed and negative other than as listed in HPI    PAST MEDICAL HISTORY     Past Medical History:   Diagnosis Date    Age-related osteoporosis without current pathological fracture 05/15/2014    Postmenopausal osteoporosis    Ankylosing spondylitis of unspecified sites in spine (Multi) 07/14/2021    Ankylosing spondylitis    Cellulitis of left toe 08/29/2022    Cellulitis of toe of left foot    Encounter for screening for malignant neoplasm of colon     Encounter for screening colonoscopy    Essential (primary) hypertension 11/14/2022    Hypertension    Gastro-esophageal reflux disease without esophagitis 04/10/2017    GERD without esophagitis    Hypothyroidism, unspecified 11/14/2022    Hypothyroidism    Other conditions influencing health status     History of normal mammogram    Pain in unspecified hip 01/11/2017    Joint pain, hip    Personal history of other diseases of the circulatory system 06/02/2021    History of aortic valve stenosis    Pure hypercholesterolemia, unspecified 11/14/2022    Hypercholesterolemia    Type 2 diabetes mellitus without complications (Multi) 11/14/2022    Controlled type 2 diabetes mellitus        SURGICAL HISTORY       Past Surgical History:   Procedure Laterality Date    ANKLE SURGERY  02/14/2014    Ankle Surgery    APPENDECTOMY  02/14/2014    Appendectomy    CHOLECYSTECTOMY  02/14/2014    Cholecystectomy    OTHER SURGICAL HISTORY  03/16/2016    Open Treatment Of Fracture Of Proximal Femoral Neck    OTHER SURGICAL HISTORY  02/14/2014    Wrist Surgery    TONSILLECTOMY  02/14/2014    Tonsillectomy    TOTAL ABDOMINAL HYSTERECTOMY W/ BILATERAL SALPINGOOPHORECTOMY  02/14/2014    Total Abdominal Hysterectomy With Removal Of Both Ovaries       CURRENT MEDICATIONS       Current Discharge Medication List        CONTINUE these medications which have NOT CHANGED    Details   acetaminophen (Tylenol) 325 mg tablet Take 1 tablet (325 mg) by mouth 2 times a day.      amLODIPine (Norvasc) 5 mg tablet Take 1 tablet (5 mg) by mouth once daily.      aspirin 81 mg EC tablet Take 1 tablet (81 mg) by mouth once daily.      atorvastatin (Lipitor) 80 mg tablet Take 1 tablet (80 mg) by mouth once daily.      calcium carbonate-vitamin D3 500 mg-5 mcg (200 unit) tablet Take 1 tablet by mouth 2 times a day.  Qty: 30 tablet, Refills: 0    Associated Diagnoses: Hypothyroidism, acquired      clopidogrel (Plavix) 75 mg tablet Take 1 tablet (75 mg) by mouth once daily.  Qty: 30 tablet, Refills: 0    Associated Diagnoses: Coronary artery disease involving coronary bypass graft, unspecified whether angina present, unspecified whether native or transplanted heart      cyanocobalamin (Vitamin B-12) 1,000 mcg tablet Take 1 tablet (1,000 mcg) by mouth once daily.      ferrous gluconate (Fergon) 324 (38 Fe) mg tablet Take 1 tablet (324 mg) by mouth early in the morning..      folic acid (Folvite) 1 mg tablet Take 1 tablet (1 mg) by mouth once daily.      FreeStyle Kamar 2 Sensor kit Inject 1 each under the skin every 14 (fourteen) days.      furosemide (Lasix) 20 mg tablet Take 1 tablet (20 mg) by mouth once daily.  Qty: 30 tablet, Refills:  0    Associated Diagnoses: Chronic heart failure with preserved ejection fraction (HFpEF)      glimepiride (Amaryl) 4 mg tablet Take 1 tablet (4 mg) by mouth 2 times a day.      insulin detemir (Levemir FlexPen) 100 unit/mL (3 mL) pen Inject 10 Units under the skin once daily at bedtime.      levothyroxine (Synthroid, Levoxyl) 88 mcg tablet Take 1 tablet (88 mcg) by mouth early in the morning.. Take on an empty stomach at the same time each day, either 30 to 60 minutes prior to breakfast  Qty: 30 tablet, Refills: 0    Associated Diagnoses: Hypothyroidism, acquired      losartan (Cozaar) 100 mg tablet Take 1 tablet (100 mg) by mouth once daily.      methotrexate (Trexall) 2.5 mg tablet Take 4 tablets (10 mg total) by mouth 1 (one) time per week.  Follow directions carefully, and ask to explain any part you do not understand. Take exactly as directed.      mirtazapine (Remeron) 7.5 mg tablet Take 1 tablet (7.5 mg) by mouth once daily at bedtime.      pantoprazole (ProtoNix) 20 mg EC tablet Take 1 tablet (20 mg) by mouth once daily in the morning. Take before meals. Do not crush, chew, or split.  Qty: 30 tablet, Refills: 0    Associated Diagnoses: Gastritis without bleeding, unspecified chronicity, unspecified gastritis type      potassium chloride CR (Klor-Con M20) 20 mEq ER tablet Take 1 tablet (20 mEq) by mouth once daily. Do not crush or chew.  Qty: 30 tablet, Refills: 0    Associated Diagnoses: Hypothyroidism, acquired      Stool Softener 100 mg capsule Take 1 capsule (100 mg) by mouth 2 times a day.      cranberry fruit extract (cranberry extract) 250 mg capsule Take 250 mg by mouth once daily.      Premarin vaginal cream Insert 0.5 g into the vagina once daily.             ALLERGIES     Patient has no known allergies.    FAMILY HISTORY     No family history on file.     SOCIAL HISTORY       Social History     Socioeconomic History    Marital status:    Tobacco Use    Smoking status: Former     Types:  Cigarettes    Smokeless tobacco: Never   Vaping Use    Vaping status: Never Used   Substance and Sexual Activity    Alcohol use: Never    Drug use: Never     Social Drivers of Health     Financial Resource Strain: Low Risk  (1/9/2025)    Overall Financial Resource Strain (CARDIA)     Difficulty of Paying Living Expenses: Not hard at all   Food Insecurity: No Food Insecurity (1/9/2025)    Hunger Vital Sign     Worried About Running Out of Food in the Last Year: Never true     Ran Out of Food in the Last Year: Never true   Transportation Needs: No Transportation Needs (1/9/2025)    PRAPARE - Transportation     Lack of Transportation (Medical): No     Lack of Transportation (Non-Medical): No   Intimate Partner Violence: Not At Risk (1/9/2025)    Humiliation, Afraid, Rape, and Kick questionnaire     Fear of Current or Ex-Partner: No     Emotionally Abused: No     Physically Abused: No     Sexually Abused: No   Housing Stability: Low Risk  (1/9/2025)    Housing Stability Vital Sign     Unable to Pay for Housing in the Last Year: No     Number of Times Moved in the Last Year: 0     Homeless in the Last Year: No       PHYSICAL EXAM       ED Triage Vitals [01/09/25 0742]   Temperature Heart Rate Respirations BP   36.9 °C (98.4 °F) 67 15 132/62      Pulse Ox Temp Source Heart Rate Source Patient Position   95 % Oral -- --      BP Location FiO2 (%)     -- --        General: Appears as stated age, no acute distress, alert  Head: Head atraumatic; normocephalic  Eyes: normal inspection; no icterus  ENT: mucosa moist without lesion  Neck: Normal inspection, no meningeal signs  Resp: Normal breath sounds, no wheeze or crackles; No respiratory distress  Chest Wall: no tenderness or deformity  Heart: Heart rate and rhythm regular; No Murmurs  Abdomen: Soft, Non-tender; No distention, guarding, rigidity, or rebound  MSK: Normal appearance; Moves all extremities; N positive bilateral lower extremity edema, making dorsalis pedis pulses  difficult to palpate, distal sensation and motor is intact, left hip is shortened and externally rotated, there is no tenderness to palpation of pubic bone or pubic symphysis, no chest wall tenderness though there is some slight well-healing ecchymosis to the left breast  Neuro: Alert; no focal deficits, moves all extremities  Psych: Mood and Affect normal  Skin: Color appropriate; warm; Dry    DIAGNOSTIC RESULTS   Lab and radiology results are independently interpreted unless noted below.  RADIOLOGY (Per Emergency Physician):     Interpretation per the Radiologist below, if available at the time of this note:  CT pelvis wo IV contrast   Final Result   Orthopedic hardware in the osseous pelvis and bilateral hips limits   evaluation however no definite acute displaced osseous pelvic or   bilateral hip fracture identified. Heterogeneity and probable remote   fracture deformity of the osseous pelvis and both hips including   remote ununited fracture of the left superior pubic ramus.        Severe arthritic changes of the left hip.        Small air-fluid level in the urinary bladder incidentally noted.   Correlate with possible recent instrumentation versus gas-forming   infection.        MACRO:   None        Signed by: Carlee Parra 1/9/2025 11:27 AM   Dictation workstation:   [id number]      XR chest 1 view   Final Result   1.  Left airspace disease as described.        Signed by: Sergo Castro 1/9/2025 9:15 AM   Dictation workstation:   [id number]      XR hip left with pelvis when performed 2 or 3 views   Final Result   No acute findings.        MACRO:   None        Signed by: Sergo Castro 1/9/2025 9:21 AM   Dictation workstation:   [id number]      CT head wo IV contrast   Final Result   No acute intracranial hemorrhage or mass-effect.        Mild left frontal scalp soft tissue swelling/contusion.        MACRO:   None.        Signed by: Carlee Parra 1/9/2025 8:52 AM   Dictation workstation:   [id number]      CT  cervical spine wo IV contrast   Final Result   No acute cervical vertebral displacement or acute displaced fracture.   multilevel productive/degenerative changes,  alignment abnormalities   and probable congenital abnormalities similar to 09/09/2023. Findings   suggestive of basilar invagination again noted, further evaluation   with MRI as clinically warranted.        Subtotal opacification of the partially visualized left lung apex.        MACRO:   None.        Signed by: Carlee Parra 1/9/2025 9:02 AM   Dictation workstation:   [id number]          LABS:  Abnormal Labs Reviewed   CBC WITH AUTO DIFFERENTIAL - Abnormal; Notable for the following components:       Result Value    RBC 3.17 (*)     Hemoglobin 10.3 (*)     Hematocrit 30.4 (*)     Neutrophils Absolute 6.12 (*)     All other components within normal limits   COMPREHENSIVE METABOLIC PANEL - Abnormal; Notable for the following components:    Glucose 218 (*)     Potassium 3.3 (*)     Urea Nitrogen 28 (*)     Creatinine 1.11 (*)     eGFR 48 (*)     Albumin 3.3 (*)     All other components within normal limits   URINALYSIS WITH REFLEX CULTURE AND MICROSCOPIC - Abnormal; Notable for the following components:    Appearance, Urine Turbid (*)     Protein, Urine 30 (1+) (*)     Blood, Urine 0.06 (1+) (*)     Leukocyte Esterase, Urine 250 Cathy/uL (*)     All other components within normal limits   MICROSCOPIC ONLY, URINE - Abnormal; Notable for the following components:    WBC, Urine 21-50 (*)     Bacteria, Urine 1+ (*)     Hyaline Casts, Urine OCCASIONAL (*)     All other components within normal limits       All other labs were within normal range or not returned as of this dictation.    EKG:  Personally interpreted by Tamara Hernandez, DO  0750: Sinus rhythm with ventricular rate 64 left axis deviation no acute ischemic changes discordant with computer read of junctional rhythm    EMERGENCY DEPARTMENT COURSE/MDM   Patient presents with left hip pain after a fall.   He does note a concern for potential left hip fracture.  Patient is provided a small dose of fentanyl for pain and workup is initiated including head neck CT    ED Course as of 01/09/25 1732   Thu Jan 09, 2025   0958 Chest x-ray shows what appears to be chronic findings.  Patient denies any current cough, fever, acute shortness of breath.  Do not feel that this represents an acute pneumonia at this time.  Pelvic and hip x-ray show no evidence of acute fracture.  Patient will be ambulated and if unsuccessful consider CT scanning at that time.  Patient family are updated and agreeable. [EF]   1022 Patient unable to ambulate.  Will therefore order CT of the pelvis and hip. [EF]   1153 CT of the pelvis returned showing no acute abnormalities.  There are remote fractures.  There is some gas in the bladder.  The patient has had no recent instrumentation and this may represent a gas-forming urinary tract infection.  Urinalysis will be obtained.  Given the patient's inability to ambulate despite absence of objective findings the patient will be admitted to the hospital for physical therapy and further care.  Family is agreeable.  Case discussed the hospitalist accepts patient to admission.  Admitted in stable condition. [EF]      ED Course User Index  [EF] Tamara Hernandez, DO         Diagnoses as of 01/09/25 1732   Fall at home, initial encounter   Pain of left hip       Meds Administered:  Medications   amLODIPine (Norvasc) tablet 5 mg (5 mg oral Given 1/9/25 1635)   aspirin EC tablet 81 mg (81 mg oral Given 1/9/25 1634)   clopidogrel (Plavix) tablet 75 mg (75 mg oral Given 1/9/25 1635)   atorvastatin (Lipitor) tablet 80 mg (has no administration in time range)   levothyroxine (Synthroid, Levoxyl) tablet 88 mcg (88 mcg oral Given 1/9/25 1635)   pantoprazole (ProtoNix) EC tablet 20 mg (has no administration in time range)   cefTRIAXone (Rocephin) 1 g in dextrose (iso) IV 50 mL (has no administration in time range)   fentaNYL  PF (Sublimaze) injection 25 mcg (25 mcg intravenous Given 1/9/25 0800)   cefTRIAXone (Rocephin) 1 g in dextrose (iso) IV 50 mL (0 g intravenous Stopped 1/9/25 1246)       PROCEDURES   Unless otherwise noted below, none  Procedures      FINAL IMPRESSION      1. Fall at home, initial encounter    2. Pain of left hip          DISPOSITION    Admit 01/09/2025 12:05:49 PM  As a result of their workup, the patient will require admission to the hospital.  The patient was informed of her diagnosis.  The patient was given the opportunity to ask questions and I answered them. The patient agreed to be admitted to the hospital.    Critical Care time: Not Indicated    (Comment: Please note this report has been produced using speech recognition software and may contain errors related to that system including errors in grammar, punctuation, and spelling, as well as words and phrases that may be inappropriate.  If there are any questions or concerns please feel free to contact the dictating provider for clarification.)    Tamara Hernandez DO (electronically signed)  Emergency Medicine Physician    History provided by: Patient and Family Member  Limitations to History: None  External Records Reviewed with Brief Summary: None  Social Determinants of Health which Significantly Impact Care: Problems related to living alone   EKG Independent Interpretation: EKG not obtained  Independent Result Review and Interpretation: Relevant laboratory and radiographic results were reviewed and independently interpreted by myself.  As necessary, they are commented on in the ED Course.  Chronic conditions affecting the patient's care: As documented above in [facility]  The patient was discussed with the following consultants/services: Hospitalist/Admitting Provider who accepted the patient for admission  Care Considerations: As documented above in [facility]               Tamara Hernandez DO  01/09/25 9561

## 2025-01-10 LAB
ALBUMIN SERPL BCP-MCNC: 2.9 G/DL (ref 3.4–5)
ALP SERPL-CCNC: 54 U/L (ref 33–136)
ALT SERPL W P-5'-P-CCNC: 11 U/L (ref 7–45)
AST SERPL W P-5'-P-CCNC: 23 U/L (ref 9–39)
BILIRUB DIRECT SERPL-MCNC: 0.2 MG/DL (ref 0–0.3)
BILIRUB SERPL-MCNC: 0.7 MG/DL (ref 0–1.2)
CHOLEST SERPL-MCNC: 93 MG/DL (ref 0–199)
CHOLESTEROL/HDL RATIO: 2.2
GLUCOSE BLD MANUAL STRIP-MCNC: 102 MG/DL (ref 74–99)
GLUCOSE BLD MANUAL STRIP-MCNC: 111 MG/DL (ref 74–99)
GLUCOSE BLD MANUAL STRIP-MCNC: 189 MG/DL (ref 74–99)
GLUCOSE BLD MANUAL STRIP-MCNC: 231 MG/DL (ref 74–99)
GLUCOSE BLD MANUAL STRIP-MCNC: 57 MG/DL (ref 74–99)
HDLC SERPL-MCNC: 41.5 MG/DL
LDLC SERPL CALC-MCNC: 37 MG/DL
NON HDL CHOLESTEROL: 52 MG/DL (ref 0–149)
PROT SERPL-MCNC: 5.7 G/DL (ref 6.4–8.2)
TRIGL SERPL-MCNC: 72 MG/DL (ref 0–149)
VLDL: 14 MG/DL (ref 0–40)

## 2025-01-10 PROCEDURE — 97161 PT EVAL LOW COMPLEX 20 MIN: CPT | Mod: GP

## 2025-01-10 PROCEDURE — 97165 OT EVAL LOW COMPLEX 30 MIN: CPT | Mod: GO

## 2025-01-10 PROCEDURE — 2500000001 HC RX 250 WO HCPCS SELF ADMINISTERED DRUGS (ALT 637 FOR MEDICARE OP): Performed by: NURSE PRACTITIONER

## 2025-01-10 PROCEDURE — 2500000002 HC RX 250 W HCPCS SELF ADMINISTERED DRUGS (ALT 637 FOR MEDICARE OP, ALT 636 FOR OP/ED): Performed by: NURSE PRACTITIONER

## 2025-01-10 PROCEDURE — 2500000004 HC RX 250 GENERAL PHARMACY W/ HCPCS (ALT 636 FOR OP/ED): Performed by: NURSE PRACTITIONER

## 2025-01-10 PROCEDURE — 36415 COLL VENOUS BLD VENIPUNCTURE: CPT | Performed by: NURSE PRACTITIONER

## 2025-01-10 PROCEDURE — 80061 LIPID PANEL: CPT | Performed by: NURSE PRACTITIONER

## 2025-01-10 PROCEDURE — G0427 INPT/ED TELECONSULT70: HCPCS | Performed by: STUDENT IN AN ORGANIZED HEALTH CARE EDUCATION/TRAINING PROGRAM

## 2025-01-10 PROCEDURE — 82947 ASSAY GLUCOSE BLOOD QUANT: CPT

## 2025-01-10 PROCEDURE — 80076 HEPATIC FUNCTION PANEL: CPT | Performed by: NURSE PRACTITIONER

## 2025-01-10 PROCEDURE — 99232 SBSQ HOSP IP/OBS MODERATE 35: CPT | Performed by: NURSE PRACTITIONER

## 2025-01-10 PROCEDURE — 1100000001 HC PRIVATE ROOM DAILY

## 2025-01-10 RX ADMIN — ATORVASTATIN CALCIUM 80 MG: 80 TABLET, FILM COATED ORAL at 22:24

## 2025-01-10 RX ADMIN — INSULIN GLARGINE 10 UNITS: 100 INJECTION, SOLUTION SUBCUTANEOUS at 22:24

## 2025-01-10 RX ADMIN — CLOPIDOGREL BISULFATE 75 MG: 75 TABLET ORAL at 10:02

## 2025-01-10 RX ADMIN — ASPIRIN 81 MG: 81 TABLET, COATED ORAL at 10:01

## 2025-01-10 RX ADMIN — PANTOPRAZOLE SODIUM 20 MG: 20 TABLET, DELAYED RELEASE ORAL at 06:33

## 2025-01-10 RX ADMIN — INSULIN LISPRO 4 UNITS: 100 INJECTION, SOLUTION INTRAVENOUS; SUBCUTANEOUS at 17:00

## 2025-01-10 RX ADMIN — CEFTRIAXONE SODIUM 1 G: 1 INJECTION, SOLUTION INTRAVENOUS at 12:34

## 2025-01-10 RX ADMIN — LEVOTHYROXINE SODIUM 88 MCG: 88 TABLET ORAL at 06:33

## 2025-01-10 ASSESSMENT — COGNITIVE AND FUNCTIONAL STATUS - GENERAL
TURNING FROM BACK TO SIDE WHILE IN FLAT BAD: A LOT
HELP NEEDED FOR BATHING: A LITTLE
CLIMB 3 TO 5 STEPS WITH RAILING: A LOT
TOILETING: A LOT
CLIMB 3 TO 5 STEPS WITH RAILING: TOTAL
DAILY ACTIVITIY SCORE: 20
WALKING IN HOSPITAL ROOM: A LITTLE
STANDING UP FROM CHAIR USING ARMS: A LOT
TOILETING: A LITTLE
DRESSING REGULAR UPPER BODY CLOTHING: A LITTLE
MOBILITY SCORE: 14
STANDING UP FROM CHAIR USING ARMS: A LITTLE
MOBILITY SCORE: 14
DRESSING REGULAR LOWER BODY CLOTHING: A LITTLE
HELP NEEDED FOR BATHING: A LITTLE
WALKING IN HOSPITAL ROOM: A LOT
PERSONAL GROOMING: A LITTLE
MOVING FROM LYING ON BACK TO SITTING ON SIDE OF FLAT BED WITH BEDRAILS: A LITTLE
MOVING TO AND FROM BED TO CHAIR: A LOT
TOILETING: A LITTLE
DRESSING REGULAR UPPER BODY CLOTHING: A LITTLE
MOVING FROM LYING ON BACK TO SITTING ON SIDE OF FLAT BED WITH BEDRAILS: A LITTLE
DRESSING REGULAR UPPER BODY CLOTHING: A LITTLE
CLIMB 3 TO 5 STEPS WITH RAILING: A LOT
DRESSING REGULAR LOWER BODY CLOTHING: A LITTLE
DRESSING REGULAR LOWER BODY CLOTHING: A LOT
MOVING FROM LYING ON BACK TO SITTING ON SIDE OF FLAT BED WITH BEDRAILS: A LITTLE
TURNING FROM BACK TO SIDE WHILE IN FLAT BAD: A LITTLE
WALKING IN HOSPITAL ROOM: A LOT
DAILY ACTIVITIY SCORE: 20
TURNING FROM BACK TO SIDE WHILE IN FLAT BAD: A LITTLE
EATING MEALS: A LITTLE
STANDING UP FROM CHAIR USING ARMS: A LOT
DAILY ACTIVITIY SCORE: 15
MOVING TO AND FROM BED TO CHAIR: A LITTLE
MOBILITY SCORE: 15
HELP NEEDED FOR BATHING: A LOT
MOVING TO AND FROM BED TO CHAIR: A LOT

## 2025-01-10 ASSESSMENT — PAIN - FUNCTIONAL ASSESSMENT
PAIN_FUNCTIONAL_ASSESSMENT: 0-10

## 2025-01-10 ASSESSMENT — PAIN SCALES - GENERAL
PAINLEVEL_OUTOF10: 0 - NO PAIN

## 2025-01-10 ASSESSMENT — ACTIVITIES OF DAILY LIVING (ADL)
ADL_ASSISTANCE: INDEPENDENT
BATHING_ASSISTANCE: MODERATE
ADL_ASSISTANCE: INDEPENDENT

## 2025-01-10 NOTE — PROGRESS NOTES
01/10/25 1356   Discharge Planning   Expected Discharge Disposition SNF  (pending choices)   Does the patient need discharge transport arranged? Yes   RoundTrip coordination needed? Yes   Has discharge transport been arranged? No     Call to granddaughter, Phyllis, for choices for SNF.  Left VM.  Spoke with patient also, who didn't really know what I was talking about.    Patient will need a precert once accepted by a facility.

## 2025-01-10 NOTE — CARE PLAN
Problem: Diabetes  Goal: Achieve decreasing blood glucose levels by end of shift  Outcome: Progressing  Goal: Increase stability of blood glucose readings by end of shift  Outcome: Progressing  Goal: Decrease in ketones present in urine by end of shift  Outcome: Progressing  Goal: Maintain electrolyte levels within acceptable range throughout shift  Outcome: Progressing  Goal: Maintain glucose levels >70mg/dl to <250mg/dl throughout shift  Outcome: Progressing  Goal: No changes in neurological exam by end of shift  Outcome: Progressing  Goal: Learn about and adhere to nutrition recommendations by end of shift  Outcome: Progressing  Goal: Vital signs within normal range for age by end of shift  Outcome: Progressing  Goal: Increase self care and/or family involovement by end of shift  Outcome: Progressing  Goal: Receive DSME education by end of shift  Outcome: Progressing   The patient's goals for the shift include increased strength    The clinical goals for the shift include monitor labs, vitals; safety    Over the shift, the patient did not make progress toward the following goals. Barriers to progression include . Recommendations to address these barriers include .

## 2025-01-10 NOTE — PROGRESS NOTES
"Natalya Lira is a 89 y.o. female on day 1 of admission presenting with Fall at home, initial encounter.    Subjective   Patient seen and examined.  No documented concerns/events overnight from nursing.  Afebrile overnight.  Denies chills.  No complaints voiced.  Denies any pain.       Objective     Physical Exam  Constitutional:       General: She is not in acute distress.     Appearance: She is not ill-appearing or toxic-appearing.   Cardiovascular:      Rate and Rhythm: Normal rate and regular rhythm.      Pulses: Normal pulses.      Heart sounds: Normal heart sounds.   Pulmonary:      Effort: Pulmonary effort is normal. No respiratory distress.      Breath sounds: Normal breath sounds. No wheezing or rales.   Abdominal:      General: Bowel sounds are normal.      Palpations: Abdomen is soft.   Musculoskeletal:      Right lower leg: Edema present.      Left lower leg: Edema present.   Neurological:      General: No focal deficit present.      Mental Status: She is alert and oriented to person, place, and time.   Skin: small hematoma to LT side of forehead    Last Recorded Vitals  Blood pressure (!) 103/47, pulse 68, temperature 36.7 °C (98.1 °F), temperature source Temporal, resp. rate 12, height 1.499 m (4' 11\"), weight 54.7 kg (120 lb 9.5 oz), SpO2 95%.  Intake/Output last 3 Shifts:  I/O last 3 completed shifts:  In: 350 (6.4 mL/kg) [P.O.:300; IV Piggyback:50]  Out: 400 (7.3 mL/kg) [Urine:400 (0.2 mL/kg/hr)]  Weight: 54.7 kg     Relevant Results    Scheduled medications  amLODIPine, 5 mg, oral, Daily  aspirin, 81 mg, oral, Daily  atorvastatin, 80 mg, oral, Nightly  cefTRIAXone, 1 g, intravenous, q24h  clopidogrel, 75 mg, oral, Daily  insulin glargine, 10 Units, subcutaneous, Nightly  insulin lispro, 0-10 Units, subcutaneous, TID AC  levothyroxine, 88 mcg, oral, Daily  pantoprazole, 20 mg, oral, Daily before breakfast      Continuous medications     PRN medications  PRN medications: dextrose, dextrose, glucagon, " glucagon                                Assessment/Plan     Fall  -fall precautions  -PT,OT  -will likely need SNF  -CT brain no acute findings        DM II  -monitor blood glucose  -diabetic diet  -ISS  -continue levemir  -Hgb A1C 10.0 (11/2024)     Hypothyroid  -continue levothyroxine     Recent CVA (11/2024)  -neurology consult  -neuro checks  -continue asa, plavix, statin     HTN  -monitor blood pressure  -continue amlodipine        UTI  -IV ceftriaxone  -urine cx pending      Plan   Above plan discussed with pt and she is in agreeement      LORNE Villa-CNP

## 2025-01-10 NOTE — PROGRESS NOTES
Evaluation    Patient Name: Natalya Lira  MRN: [id number]  Department: [address]  Room: [address] [place] [address]  Today's Date: 1/10/2025  Time Calculation  Start Time: 0902  Stop Time: 0911  Time Calculation (min): 9 min    Assessment  IP OT Assessment  OT Assessment: 89 yo female admitted following a fall out of bed presents below baseline functional status d/t impaired activity tolerance and generalized weakness.  OT to address deficits by providing ADL retraining utlizing compensatory techniques and progressive strengthening in order to increase functional capacity and safety with mobility.  Prognosis: Good  Barriers to Discharge Home: Physical needs  Physical Needs: 24hr mobility assistance needed, 24hr ADL assistance needed, High falls risk due to function or environment  Evaluation/Treatment Tolerance: Patient limited by fatigue  Medical Staff Made Aware: Yes  End of Session Communication: Bedside nurse  End of Session Patient Position: Up in chair, Alarm on    Plan:  Treatment Interventions: ADL retraining, Functional transfer training, UE strengthening/ROM, Endurance training, Patient/family training, Equipment evaluation/education, Compensatory technique education  OT Frequency: 3 times per week  OT Discharge Recommendations: Moderate intensity level of continued care  Equipment Recommended upon Discharge: Wheeled walker  OT Recommended Transfer Status: Assist of 1  OT - OK to Discharge: Yes      Subjective   Current Problem:  1. Fall at home, initial encounter        2. Pain of left hip          General:  General  Reason for Referral: Impaired ADLs s/p fall  Referred By: Dr Christianson  Past Medical History Relevant to Rehab: OP, HTN, hypothyroid, neuropathy, CHF, RA  Family/Caregiver Present: No  Co-Treatment: PT  Co-Treatment Reason: to maximize safety with mobility  Prior to Session Communication: Bedside nurse  Patient Position Received: Bed, 3 rail up, Alarm on  Preferred Learning Style: verbal, visual  General  Comment: Cleared by nursing for therapy and agreeable to same.    Precautions:  Medical Precautions: Fall precautions    Pain:  Pain Assessment  Pain Assessment: 0-10  0-10 (Numeric) Pain Score: 0 - No pain      Objective   Cognition:  Overall Cognitive Status: Impaired  Orientation Level: Disoriented to time  Following Commands: Follows one step commands with increased time  Insight: Mild  Processing Speed: Delayed    Home Living:  Type of Home: House  Lives With: Grandchildren (grand-daughter)  Home Adaptive Equipment: Other (Comment) (rollator; Med-Alert)  Home Layout: One level  Home Access: Ramped entrance  Bathroom Shower/Tub: Walk-in shower  Bathroom Equipment: Grab bars in shower, Shower chair without back     Prior Function:  Level of [place]: Independent with ADLs and functional transfers, Needs assistance with homemaking  Receives Help From: Family, Other (Comment) (has hospice services)  ADL Assistance: Independent  Homemaking Assistance: Needs assistance (grand-daughter completes)  Ambulatory Assistance: Independent (with a rollator)    ADL:  Eating Assistance: Stand by  Eating Deficit: Setup (assist opening containers)  Grooming Assistance: Stand by  Grooming Deficit: Setup (per clinical judgement)  Bathing Assistance: Moderate  Bathing Deficit: Buttocks, Right lower leg including foot, Left lower leg including foot (per clinical judgement)  UE Dressing Assistance: Minimal  UE Dressing Deficit: Pull down in back (per clinical judgement)  LE Dressing Assistance: Maximal  LE Dressing Deficit:  (assist donning/doffing protective briefs - remainder per clinical judgement)  Toileting Assistance with Device: Maximal  Toileting Deficit: Incontinent, Urinary Catheter    Activity Tolerance:  Endurance: Decreased tolerance for upright activites  Activity Tolerance Comments: fatigues easily    Bed Mobility/Transfers: Bed Mobility  Bed Mobility: Yes  Bed Mobility 1  Bed Mobility 1: Supine to sitting  Level of  Assistance 1: Minimum assistance  Bed Mobility Comments 1: assist with trunk toward the right side of bed with head elevated ~40 degrees    Transfer 1  Transfer From 1: Bed to  Transfer to 1: Chair with arms  Technique 1: Stand pivot, To right  Transfer Device 1: Walker  Transfer Level of Assistance 1: Minimum assistance  Trials/Comments 1: cues for technique    Sensation:  Sensation Comment: Endorses tingling/numbness in hands at baseline    Strength:  Strength Comments: BUEs    Hand Function:  Hand Function  Gross Grasp: Functional  Coordination: Functional    Extremities: RUE   RUE : Within Functional Limits and LUE   LUE: Exceptions to WFL (decreased shoulder flexion at baseline)    Outcome Measures: [facility] Daily Activity  Putting on and taking off regular lower body clothing: A lot  Bathing (including washing, rinsing, drying): A lot  Putting on and taking off regular upper body clothing: A little  Toileting, which includes using toilet, bedpan or urinal: A lot  Taking care of personal grooming such as brushing teeth: A little  Eating Meals: A little  Daily Activity - Total Score: 15    Education Documentation  ADL Training, taught by Corine Dejesus OT at 1/10/2025 10:32 AM.  Learner: Patient  Readiness: Acceptance  Method: Explanation  Response: Demonstrated Understanding, Needs Reinforcement  Comment: Functional transfer retraining initiated    Goals:   Encounter Problems       Encounter Problems (Active)       OT Goals       ADLs (Progressing)       Start:  01/10/25    Expected End:  01/24/25       Patient will complete ADL tasks, with modified independent using AE need in order to increase patient's safety and independence with self-care tasks.           Functional transfers (Progressing)       Start:  01/10/25    Expected End:  01/24/25       Patient will complete functional transfers with modified independent using least restrictive device, in order to increase patient's safety and independence with  daily tasks.           Activity tolerance (Progressing)       Start:  01/10/25    Expected End:  01/24/25       Patient will demonstrate the ability to participate in functional activity at least >/= 25 minutes in order to increase patient's safety and independence with daily tasks.

## 2025-01-10 NOTE — CONSULTS
"Tele-Neurology Consult    The patient was informed about the telehealth clinical encounter. Telehealth sessions are not being recorded and personal health information is protected. All questions were answered and verbal consent from the patient (or guardian) was obtained to proceed.    History Of Present Illness:  Ms. Lira is a 89 y.o. female admitted 1/9/2025 LOS day 1, consulted for falls, inability to walk, recent CVA.     She found herself on the side of the bed. This was at night, she said she couldn't get her leg up into bed. She woke up and pushed the button for life alert.      Currently she feels fine.     Granddaughter said she went to check on the patient this morning around 6 AM and the patient was found on the ground.  Patient is unable to recall what happened although she tells me she thinks she \"rolled out of bed \".  She has a hematoma to the LT side of a scalp.     She was recently 11/2024 hospitalized at the [facility] with left facial droop and left worse than right weakness, found to have multiple acute infarcts with petechial hemorrhage in the right parietal lobe.  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  The other vessels of the neck showed no critical stenosis. Results of RICHARDSON reviewed and the recommendations are: No embolic source on RICHARDSON.  No mechanism identified send Zio patch.      Reviewed relevant results, independent review of imaging:   CT head shows no acute infarct.  There is a left frontal scalp contusion.      Past Medical History  she has a past medical history of Age-related osteoporosis without current pathological fracture (05/15/2014), Ankylosing spondylitis of unspecified sites in spine (Multi) (07/14/2021), Cellulitis of left toe (08/29/2022), Encounter for screening for malignant neoplasm of colon, Essential (primary) hypertension (11/14/2022), Gastro-esophageal reflux disease without esophagitis (04/10/2017), Hypothyroidism, unspecified " (11/14/2022), Other conditions influencing health status, Pain in unspecified hip (01/11/2017), Personal history of other diseases of the circulatory system (06/02/2021), Pure hypercholesterolemia, unspecified (11/14/2022), and Type 2 diabetes mellitus without complications (Multi) (11/14/2022).  Surgical History  Past Surgical History:   Procedure Laterality Date    ANKLE SURGERY  02/14/2014    Ankle Surgery    APPENDECTOMY  02/14/2014    Appendectomy    CHOLECYSTECTOMY  02/14/2014    Cholecystectomy    OTHER SURGICAL HISTORY  03/16/2016    Open Treatment Of Fracture Of Proximal Femoral Neck    OTHER SURGICAL HISTORY  02/14/2014    Wrist Surgery    TONSILLECTOMY  02/14/2014    Tonsillectomy    TOTAL ABDOMINAL HYSTERECTOMY W/ BILATERAL SALPINGOOPHORECTOMY  02/14/2014    Total Abdominal Hysterectomy With Removal Of Both Ovaries     Social History  Social History     Tobacco Use    Smoking status: Former     Types: Cigarettes    Smokeless tobacco: Never   Vaping Use    Vaping status: Never Used   Substance Use Topics    Alcohol use: Never    Drug use: Never     Meds and Allergies  Reviewed in EMR  Current Outpatient Medications   Medication Instructions    acetaminophen (TYLENOL) 325 mg, 2 times daily    amLODIPine (NORVASC) 5 mg, Daily    aspirin 81 mg, Daily    atorvastatin (LIPITOR) 80 mg, Daily    calcium carbonate-vitamin D3 500 mg-5 mcg (200 unit) tablet 1 tablet, oral, 2 times daily    clopidogrel (PLAVIX) 75 mg, oral, Daily    cranberry extract 250 mg, oral, Daily    cyanocobalamin (VITAMIN B-12) 1,000 mcg, Daily    ferrous gluconate (Fergon) 324 (38 Fe) mg tablet 1 tablet, Daily (0630)    folic acid (FOLVITE) 1 mg, Daily    FreeStyle Kamar 2 Sensor kit 1 each, Every 14 days    furosemide (LASIX) 20 mg, oral, Daily    glimepiride (AMARYL) 4 mg, 2 times daily    Levemir FlexPen 10 Units, Nightly    levothyroxine (SYNTHROID, LEVOXYL) 88 mcg, oral, Daily, Take on an empty stomach at the same time each day, either  "30 to 60 minutes prior to breakfast    losartan (COZAAR) 100 mg, Daily    methotrexate (TREXALL) 10 mg, Once Weekly    mirtazapine (REMERON) 7.5 mg, Nightly    pantoprazole (PROTONIX) 20 mg, oral, Daily before breakfast, Do not crush, chew, or split.    potassium chloride CR (Klor-Con M20) 20 mEq ER tablet 20 mEq, oral, Daily, Do not crush or chew.    Premarin 0.5 g, Daily    Stool Softener 100 mg, 2 times daily        Objective:  Blood pressure (!) 103/47, pulse 68, temperature 36.7 °C (98.1 °F), temperature source Temporal, resp. rate 12, height 1.499 m (4' 11\"), weight 54.7 kg (120 lb 9.5 oz), SpO2 95%.    Virtual Neurological Exam:   General appearance - in no acute distress.   Mental status: alert, interactive and cooperative with an appropriate affect.    Speech is clear.    Language intact for comprehension, expression, and vocabulary.    Orientation to self, time and place intact.    Cranial nerves: No ptosis. Facial symmetry and movements are normal.    Ocular movements full without complaints of diplopia or observed nystagmus.    Motor: left upper ext weakness.    Coordination of UE is normal.   Sensation: light touch is reported to be intact.    Gait is normal, normal base, without observed spasticity or ataxia.    Reported NIHSS on Arrival:  1a  Level of consciousness: 0=alert; keenly responsive   1b. LOC questions:  0=Performs both tasks correctly   1c. LOC commands: 0=Performs both tasks correctly   2.  Best Gaze: 0=normal   3. Visual: 0=No visual loss   4. Facial Palsy: 0=Normal symmetric movement   5a. Motor Left Arm: 1=Drift, limb holds 90 (or 45) degrees but drifts down before full 10 seconds: does not hit bed   5b.  Motor Right Arm: 0=No drift, limb holds 90 (or 45) degrees for full 10 seconds   6a. Motor Left Le=No drift, limb holds 90 (or 45) degrees for full 10 seconds   6b  Motor Right Le=No drift, limb holds 90 (or 45) degrees for full 10 seconds   7. Limb Ataxia: 0=Absent   8.  " Sensory: 0=Normal; no sensory loss   9. Best Language:  0=No aphasia, normal   10. Dysarthria: 0=Normal   11. Extinction and Inattention: 0=No abnormality          Total:   1        Assessment:   Ms. Lira is a 89 y.o.  female recently hospitalized in November 2024 at [facility] for a right parietal lobe stroke associated with left facial droop and left arm weakness.  No mechanism was identified.  She now presents with a fall out of bed.  She remembers trying to get her leg up in the bed and then fell and remembers being on the floor and the paramedics coming.    There did not seem to be any neurological symptoms before or after the event.  However the patient is somewhat unsure on details.  Regardless her exam seems similar to what was described at the previous [facility] evaluation and the patient feels like she is at her baseline.    I do not think she had another cerebral vascular event, regardless I will need to talk with her granddaughter to get more information.      Recommendations:  I have been unable to get a hold of her granddaughter so far today, will continue to contact her for additional history  Continue with secondary stroke preventatives including aspirin Plavix and statin  Further recommendations pending above    Consult completed by: TELEPHONE and VIDEO interactive communication was used to provide this telehealth service. Time includes consultation with ED provider and extensive review of data- history, medical records, lab/radiology/medical test results, neuroimaging studies:  70 minutes was spent in INITIAL telehealth consultation    Adán Alvarez MD  Neurology and Neuromuscular Medicine   [facility]  [facility]

## 2025-01-10 NOTE — PROGRESS NOTES
Physical Therapy    Physical Therapy Evaluation    Patient Name: Natalya Lira  MRN: [id number]  Department: [address]  Room: [address] [place] [address]  Today's Date: 1/10/2025   Time Calculation  Start Time: 0855  Stop Time: 0910  Time Calculation (min): 15 min    Assessment/Plan   PT Assessment  PT Assessment Results: Decreased strength, Decreased endurance, Impaired balance, Decreased mobility, Impaired judgement, Decreased safety awareness, Decreased coordination  Rehab Prognosis: Good  Barriers to Discharge Home: Physical needs, Caregiver assistance  Caregiver Assistance: Caregiver assistance needed per identified barriers - however, level of patient's required assistance exceeds assistance available at home  Physical Needs: Intermittent mobility assistance needed, High falls risk due to function or environment  Evaluation/Treatment Tolerance: Patient limited by fatigue  Medical Staff Made Aware: Yes  End of Session Communication: Bedside nurse  End of Session Patient Position: Up in chair, Alarm on        Assessment Comment: 89 y/o female who presented to ED after being found down, falling  out of bed. Scans negative for acute abnormalities. Pt reportedly ambulates with rollator at baseline. Granddaughter assists as needed. Is currently an increased falls risk due to impaired balance, decreased strength and decreased activity tolerance. Would benefit from cont PT services to maximize safe mobility.        IP OR SWING BED PT PLAN  Inpatient or Swing Bed: Inpatient  PT Plan  Treatment/Interventions: Bed mobility, Transfer training, Gait training, Balance training, Neuromuscular re-education, Strengthening, Endurance training, Therapeutic exercise, Therapeutic activity  PT Plan: Ongoing PT  PT Frequency: 4 times per week  PT Discharge Recommendations: Moderate intensity level of continued care  Equipment Recommended upon Discharge: Wheeled walker  PT Recommended Transfer Status: Assist x1, Assistive device  PT - OK to Discharge:  "Yes    Subjective   General Visit Information:  General  Reason for Referral: impaired mobility; found down, UTI  Past Medical History Relevant to Rehab: PMH including but not limited to osteoporosis, HTN, hypothroid, DM, GERD  Family/Caregiver Present: No  Co-Treatment: OT  Co-Treatment Reason: safe OOB mobility  Prior to Session Communication: Bedside nurse  Patient Position Received: Bed, 3 rail up, Alarm on  Preferred Learning Style: verbal, visual  General Comment: 88 y/o female who presented to ED post fall out of bed. Pt cleared for mobility per nursing. Supine in bed upon arrival. Agreeable to participate. nii.  Home Living:  Home Living  Type of Home: House  Lives With:  (Grand-daughter)  Home Adaptive Equipment:  (rollator)  Home Layout: One level  Home Access: Ramped entrance, Stairs to enter with rails  Entrance Stairs-Number of Steps: RUCHI vs ramp  Bathroom Shower/Tub: Walk-in shower  Bathroom Equipment: Grab bars in shower, Shower chair without back  Home Living Comments: Lives in single story home, [facility] lives wtih patient.  Prior Level of Function:  Prior Function Per Pt/Caregiver Report  Level of [place]: Independent with ADLs and functional transfers  Receives Help From: Family  ADL Assistance: Independent  Ambulatory Assistance: Independent (rollator)  Prior Function Comments: Amb with rollator. Granddaughter assists wt IADLs.  Precautions:  Precautions  Medical Precautions: Fall precautions     Objective   Pain:  Pain Assessment  Pain Assessment: 0-10  0-10 (Numeric) Pain Score: 0 - No pain  Cognition:  Cognition  Orientation Level:  (oriented to self, place and year.  For month, pt stated, \"we had [place] already, right?\")  Cognition Comments: able to follow simple, motor commands. Cueing for safety. Appeared confused at times.  Insight: Mild  Processing Speed: Delayed    General Assessments:  General Observation  General Observation: cooperative     Activity " Tolerance  Endurance: Decreased tolerance for upright activites  Activity Tolerance Comments: fatigues easily    Sensation  Light Touch: No apparent deficits    Strength  Strength Comments: B LEs > 3/5 observed       Postural Control  Posture Comment: kyphotic posture    Static Sitting Balance  Static Sitting-Level of Assistance: Close supervision  Static Sitting-Comment/Number of Minutes: sitting on EOB wtih UE support    Static Standing Balance  Static Standing-Level of Assistance: Minimum assistance  Static Standing-Comment/Number of Minutes: UE support of walker  Dynamic Standing Balance  Dynamic Standing-Comments: UE support of walker, min assist  Functional Assessments:  Bed Mobility  Bed Mobility: Yes  Bed Mobility 1  Bed Mobility 1: Supine to sitting  Level of Assistance 1: Minimum assistance  Bed Mobility Comments 1: HOB elevated, increased time to complete    Transfers  Transfer: Yes  Transfer 1  Technique 1: Sit to stand, Stand to sit  Transfer Device 1: Walker  Transfer Level of Assistance 1: Minimum assistance  Trials/Comments 1: from elevated EOB, onto chair with arms    Ambulation/Gait Training  Ambulation/Gait Training Performed: Yes  Ambulation/Gait Training 1  Surface 1: Level tile  Device 1: Rolling walker  Assistance 1: Minimum assistance  Quality of Gait 1:  (kyphotic posture, minimal foot clearance, decreased naren)  Comments/Distance (ft) 1: 3 ft to chair  Extremity/Trunk Assessments:  RLE   RLE : Exceptions to WFL  Strength RLE  RLE Overall Strength: Greater than or equal to 3/5 as evidenced by functional mobility  LLE   LLE : Exceptions to WFL  Strength LLE  LLE Overall Strength: Greater than or equal to 3/5 as evidenced by functional mobility  Outcome Measures:  [facility] Basic Mobility  Turning from your back to your side while in a flat bed without using bedrails: A little  Moving from lying on your back to sitting on the side of a flat bed without using bedrails: A lot  Moving to and from  bed to chair (including a wheelchair): A little  Standing up from a chair using your arms (e.g. wheelchair or bedside chair): A little  To walk in hospital room: A little  Climbing 3-5 steps with railing: Total  Basic Mobility - Total Score: 15    Encounter Problems       Encounter Problems (Active)       Balance       dynamic (Progressing)       Start:  01/10/25    Expected End:  01/24/25       No LOB with dynamic activities, UE support of walker, supervision            Mobility       bed mobility (Progressing)       Start:  01/10/25    Expected End:  01/24/25       Pt will perform sup to/from sit transfer with supervision          ambulation (Progressing)       Start:  01/10/25    Expected End:  01/24/25       Pt will amb > 75  ft with wheeled walker and mod independence             PT Transfers       sit to stand (Progressing)       Start:  01/10/25    Expected End:  01/24/25       Pt will perform sit to stand transfer with walker and supervision         bed to chair (Progressing)       Start:  01/10/25    Expected End:  01/24/25       Pt will perform bed to chair transfer with walker and supervision            Safety       LTG - Patient will utilize safety techniques (Progressing)       Start:  01/10/25    Expected End:  01/24/25                   Education Documentation  Precautions, taught by Linda Lozano PT at 1/10/2025  9:53 AM.  Learner: Patient  Readiness: Acceptance  Method: Explanation  Response: Needs Reinforcement  Comment: falls precautions, safe mobility    Body Mechanics, taught by Linda Lozano PT at 1/10/2025  9:53 AM.  Learner: Patient  Readiness: Acceptance  Method: Explanation  Response: Needs Reinforcement  Comment: falls precautions, safe mobility    Mobility Training, taught by Linda Lozano PT at 1/10/2025  9:53 AM.  Learner: Patient  Readiness: Acceptance  Method: Explanation  Response: Needs Reinforcement  Comment: falls precautions, safe mobility    Education Comments  No comments  found.

## 2025-01-10 NOTE — CARE PLAN
The patient's goals for the shift include increased strength    The clinical goals for the shift include monitor labs, vitals; safety

## 2025-01-11 LAB
ANION GAP SERPL CALCULATED.3IONS-SCNC: 11 MMOL/L (ref 10–20)
BACTERIA UR CULT: ABNORMAL
BUN SERPL-MCNC: 20 MG/DL (ref 6–23)
CALCIUM SERPL-MCNC: 7.9 MG/DL (ref 8.6–10.3)
CHLORIDE SERPL-SCNC: 102 MMOL/L (ref 98–107)
CO2 SERPL-SCNC: 25 MMOL/L (ref 21–32)
CREAT SERPL-MCNC: 1.12 MG/DL (ref 0.5–1.05)
EGFRCR SERPLBLD CKD-EPI 2021: 47 ML/MIN/1.73M*2
ERYTHROCYTE [DISTWIDTH] IN BLOOD BY AUTOMATED COUNT: 14.7 % (ref 11.5–14.5)
GLUCOSE BLD MANUAL STRIP-MCNC: 202 MG/DL (ref 74–99)
GLUCOSE BLD MANUAL STRIP-MCNC: 203 MG/DL (ref 74–99)
GLUCOSE BLD MANUAL STRIP-MCNC: 210 MG/DL (ref 74–99)
GLUCOSE BLD MANUAL STRIP-MCNC: 251 MG/DL (ref 74–99)
GLUCOSE BLD MANUAL STRIP-MCNC: 71 MG/DL (ref 74–99)
GLUCOSE SERPL-MCNC: 68 MG/DL (ref 74–99)
HCT VFR BLD AUTO: 31.8 % (ref 36–46)
HGB BLD-MCNC: 10 G/DL (ref 12–16)
MCH RBC QN AUTO: 31.9 PG (ref 26–34)
MCHC RBC AUTO-ENTMCNC: 31.4 G/DL (ref 32–36)
MCV RBC AUTO: 102 FL (ref 80–100)
NRBC BLD-RTO: 0 /100 WBCS (ref 0–0)
PLATELET # BLD AUTO: 157 X10*3/UL (ref 150–450)
POTASSIUM SERPL-SCNC: 3.5 MMOL/L (ref 3.5–5.3)
RBC # BLD AUTO: 3.13 X10*6/UL (ref 4–5.2)
SODIUM SERPL-SCNC: 134 MMOL/L (ref 136–145)
WBC # BLD AUTO: 6.9 X10*3/UL (ref 4.4–11.3)

## 2025-01-11 PROCEDURE — 1100000001 HC PRIVATE ROOM DAILY

## 2025-01-11 PROCEDURE — 2500000004 HC RX 250 GENERAL PHARMACY W/ HCPCS (ALT 636 FOR OP/ED): Performed by: NURSE PRACTITIONER

## 2025-01-11 PROCEDURE — 80048 BASIC METABOLIC PNL TOTAL CA: CPT | Performed by: NURSE PRACTITIONER

## 2025-01-11 PROCEDURE — 36415 COLL VENOUS BLD VENIPUNCTURE: CPT | Performed by: NURSE PRACTITIONER

## 2025-01-11 PROCEDURE — 82947 ASSAY GLUCOSE BLOOD QUANT: CPT

## 2025-01-11 PROCEDURE — 2500000002 HC RX 250 W HCPCS SELF ADMINISTERED DRUGS (ALT 637 FOR MEDICARE OP, ALT 636 FOR OP/ED): Performed by: NURSE PRACTITIONER

## 2025-01-11 PROCEDURE — 2500000001 HC RX 250 WO HCPCS SELF ADMINISTERED DRUGS (ALT 637 FOR MEDICARE OP): Performed by: NURSE PRACTITIONER

## 2025-01-11 PROCEDURE — 85027 COMPLETE CBC AUTOMATED: CPT | Performed by: NURSE PRACTITIONER

## 2025-01-11 PROCEDURE — 99232 SBSQ HOSP IP/OBS MODERATE 35: CPT | Performed by: NURSE PRACTITIONER

## 2025-01-11 RX ADMIN — INSULIN LISPRO 4 UNITS: 100 INJECTION, SOLUTION INTRAVENOUS; SUBCUTANEOUS at 17:03

## 2025-01-11 RX ADMIN — ATORVASTATIN CALCIUM 80 MG: 80 TABLET, FILM COATED ORAL at 21:18

## 2025-01-11 RX ADMIN — CLOPIDOGREL BISULFATE 75 MG: 75 TABLET ORAL at 09:44

## 2025-01-11 RX ADMIN — LEVOTHYROXINE SODIUM 88 MCG: 88 TABLET ORAL at 05:18

## 2025-01-11 RX ADMIN — PANTOPRAZOLE SODIUM 20 MG: 20 TABLET, DELAYED RELEASE ORAL at 05:18

## 2025-01-11 RX ADMIN — ASPIRIN 81 MG: 81 TABLET, COATED ORAL at 09:44

## 2025-01-11 RX ADMIN — INSULIN GLARGINE 10 UNITS: 100 INJECTION, SOLUTION SUBCUTANEOUS at 21:08

## 2025-01-11 RX ADMIN — CEFTRIAXONE SODIUM 1 G: 1 INJECTION, SOLUTION INTRAVENOUS at 12:44

## 2025-01-11 RX ADMIN — AMLODIPINE BESYLATE 5 MG: 5 TABLET ORAL at 09:44

## 2025-01-11 RX ADMIN — INSULIN LISPRO 6 UNITS: 100 INJECTION, SOLUTION INTRAVENOUS; SUBCUTANEOUS at 14:33

## 2025-01-11 ASSESSMENT — COGNITIVE AND FUNCTIONAL STATUS - GENERAL
DRESSING REGULAR UPPER BODY CLOTHING: A LITTLE
WALKING IN HOSPITAL ROOM: A LOT
STANDING UP FROM CHAIR USING ARMS: A LOT
PERSONAL GROOMING: A LITTLE
MOVING TO AND FROM BED TO CHAIR: A LOT
CLIMB 3 TO 5 STEPS WITH RAILING: A LOT
DAILY ACTIVITIY SCORE: 19
MOVING FROM LYING ON BACK TO SITTING ON SIDE OF FLAT BED WITH BEDRAILS: A LITTLE
MOBILITY SCORE: 14
TURNING FROM BACK TO SIDE WHILE IN FLAT BAD: A LITTLE
TOILETING: A LITTLE
DRESSING REGULAR LOWER BODY CLOTHING: A LITTLE
HELP NEEDED FOR BATHING: A LITTLE

## 2025-01-11 ASSESSMENT — PAIN - FUNCTIONAL ASSESSMENT
PAIN_FUNCTIONAL_ASSESSMENT: 0-10

## 2025-01-11 ASSESSMENT — PAIN SCALES - GENERAL
PAINLEVEL_OUTOF10: 0 - NO PAIN

## 2025-01-11 NOTE — PROGRESS NOTES
"Natalya Lira is a 89 y.o. female on day 2 of admission presenting with Fall at home, initial encounter.    Subjective   Pt seen and examined.  No documented/concerns events overnight.  No complaints voiced.       Objective     Physical Exam  Constitutional:       General: She is not in acute distress.     Appearance: She is not ill-appearing or toxic-appearing.   Cardiovascular:      Rate and Rhythm: Normal rate and regular rhythm.      Pulses: Normal pulses.      Heart sounds: Normal heart sounds.   Pulmonary:      Effort: Pulmonary effort is normal. No respiratory distress.      Breath sounds: Normal breath sounds. No wheezing or rales.   Abdominal:      General: Bowel sounds are normal.      Palpations: Abdomen is soft.   Musculoskeletal:      Right lower leg: Edema present.      Left lower leg: Edema present.   Neurological:      General: No focal deficit present.      Mental Status: She is alert and oriented to person, place, and time.   Skin: small hematoma to LT side of forehead    Last Recorded Vitals  Blood pressure 113/51, pulse 73, temperature 36.6 °C (97.9 °F), temperature source Temporal, resp. rate 18, height 1.499 m (4' 11\"), weight 54.7 kg (120 lb 9.5 oz), SpO2 96%.  Intake/Output last 3 Shifts:  I/O last 3 completed shifts:  In: 1450 (26.5 mL/kg) [P.O.:1400; IV Piggyback:50]  Out: 900 (16.5 mL/kg) [Urine:900 (0.5 mL/kg/hr)]  Weight: 54.7 kg     Relevant Results    Scheduled medications  amLODIPine, 5 mg, oral, Daily  aspirin, 81 mg, oral, Daily  atorvastatin, 80 mg, oral, Nightly  cefTRIAXone, 1 g, intravenous, q24h  clopidogrel, 75 mg, oral, Daily  insulin glargine, 10 Units, subcutaneous, Nightly  insulin lispro, 0-10 Units, subcutaneous, TID AC  levothyroxine, 88 mcg, oral, Daily  pantoprazole, 20 mg, oral, Daily before breakfast      Continuous medications     PRN medications  PRN medications: dextrose, dextrose, glucagon, glucagon     following data reviewed    WBC- " 6.9  Hgb-10.0  Hct-31.8  Platelets-157        Na-134  K+-3.5  Cl-102  Bicarb-25  BUN-11  creatinine-1.1                              Assessment/Plan   Fall  -fall precautions  -PT,OT recommend mod intensity  -will likely need SNF  -CT brain no acute findings        DM II  -monitor blood glucose  -diabetic diet  -ISS  -continue levemir  -Hgb A1C 10.0 (11/2024)     Hypothyroid  -continue levothyroxine     Recent CVA (11/2024)  -neurology following  -neuro checks  -continue asa, plavix, statin     HTN  -monitor blood pressure  -continue amlodipine        UTI  -IV ceftriaxone  -urine cx pending grwoing >100,000 cfu/ml enteric bacilli     Plan   Above plan discussed with pt and she is in agreeement           LORNE Villa-CNP

## 2025-01-11 NOTE — CARE PLAN
Problem: Fall/Injury  Goal: Not fall by end of shift  Outcome: Progressing  Goal: Be free from injury by end of the shift  Outcome: Progressing  Goal: Verbalize understanding of personal risk factors for fall in the hospital  Outcome: Progressing  Goal: Verbalize understanding of risk factor reduction measures to prevent injury from fall in the home  Outcome: Progressing  Goal: Use assistive devices by end of the shift  Outcome: Progressing  Goal: Pace activities to prevent fatigue by end of the shift  Outcome: Progressing   The patient's goals for the shift include PT    The clinical goals for the shift include Safety, monitor labs and vitals

## 2025-01-11 NOTE — CARE PLAN
The patient's goals for the shift include PT    The clinical goals for the shift include monitor labs

## 2025-01-12 VITALS
HEART RATE: 71 BPM | HEIGHT: 59 IN | SYSTOLIC BLOOD PRESSURE: 136 MMHG | WEIGHT: 120.59 LBS | BODY MASS INDEX: 24.31 KG/M2 | RESPIRATION RATE: 16 BRPM | OXYGEN SATURATION: 93 % | TEMPERATURE: 99.9 F | DIASTOLIC BLOOD PRESSURE: 53 MMHG

## 2025-01-12 LAB
GLUCOSE BLD MANUAL STRIP-MCNC: 113 MG/DL (ref 74–99)
GLUCOSE BLD MANUAL STRIP-MCNC: 179 MG/DL (ref 74–99)
GLUCOSE BLD MANUAL STRIP-MCNC: 273 MG/DL (ref 74–99)
GLUCOSE BLD MANUAL STRIP-MCNC: 82 MG/DL (ref 74–99)

## 2025-01-12 PROCEDURE — 2500000002 HC RX 250 W HCPCS SELF ADMINISTERED DRUGS (ALT 637 FOR MEDICARE OP, ALT 636 FOR OP/ED): Performed by: NURSE PRACTITIONER

## 2025-01-12 PROCEDURE — 82947 ASSAY GLUCOSE BLOOD QUANT: CPT

## 2025-01-12 PROCEDURE — G0407 INPT/TELE FOLLOW UP 25: HCPCS | Performed by: STUDENT IN AN ORGANIZED HEALTH CARE EDUCATION/TRAINING PROGRAM

## 2025-01-12 PROCEDURE — 99232 SBSQ HOSP IP/OBS MODERATE 35: CPT | Performed by: NURSE PRACTITIONER

## 2025-01-12 PROCEDURE — 1100000001 HC PRIVATE ROOM DAILY

## 2025-01-12 PROCEDURE — 2500000004 HC RX 250 GENERAL PHARMACY W/ HCPCS (ALT 636 FOR OP/ED): Performed by: NURSE PRACTITIONER

## 2025-01-12 PROCEDURE — 2500000001 HC RX 250 WO HCPCS SELF ADMINISTERED DRUGS (ALT 637 FOR MEDICARE OP): Performed by: NURSE PRACTITIONER

## 2025-01-12 RX ORDER — GLIMEPIRIDE 2 MG/1
4 TABLET ORAL
Status: DISCONTINUED | OUTPATIENT
Start: 2025-01-13 | End: 2025-01-14 | Stop reason: HOSPADM

## 2025-01-12 RX ORDER — FUROSEMIDE 20 MG/1
20 TABLET ORAL DAILY
Status: DISCONTINUED | OUTPATIENT
Start: 2025-01-12 | End: 2025-01-14 | Stop reason: HOSPADM

## 2025-01-12 RX ADMIN — CLOPIDOGREL BISULFATE 75 MG: 75 TABLET ORAL at 08:42

## 2025-01-12 RX ADMIN — PANTOPRAZOLE SODIUM 20 MG: 20 TABLET, DELAYED RELEASE ORAL at 06:27

## 2025-01-12 RX ADMIN — ATORVASTATIN CALCIUM 80 MG: 80 TABLET, FILM COATED ORAL at 21:36

## 2025-01-12 RX ADMIN — AMLODIPINE BESYLATE 5 MG: 5 TABLET ORAL at 08:42

## 2025-01-12 RX ADMIN — CEFTRIAXONE SODIUM 1 G: 1 INJECTION, SOLUTION INTRAVENOUS at 11:53

## 2025-01-12 RX ADMIN — FUROSEMIDE 20 MG: 20 TABLET ORAL at 11:53

## 2025-01-12 RX ADMIN — INSULIN LISPRO 2 UNITS: 100 INJECTION, SOLUTION INTRAVENOUS; SUBCUTANEOUS at 16:24

## 2025-01-12 RX ADMIN — INSULIN LISPRO 6 UNITS: 100 INJECTION, SOLUTION INTRAVENOUS; SUBCUTANEOUS at 13:07

## 2025-01-12 RX ADMIN — ASPIRIN 81 MG: 81 TABLET, COATED ORAL at 08:42

## 2025-01-12 RX ADMIN — LEVOTHYROXINE SODIUM 88 MCG: 88 TABLET ORAL at 06:27

## 2025-01-12 RX ADMIN — INSULIN GLARGINE 10 UNITS: 100 INJECTION, SOLUTION SUBCUTANEOUS at 21:44

## 2025-01-12 ASSESSMENT — COGNITIVE AND FUNCTIONAL STATUS - GENERAL
CLIMB 3 TO 5 STEPS WITH RAILING: A LOT
WALKING IN HOSPITAL ROOM: A LOT
MOVING TO AND FROM BED TO CHAIR: A LOT
TOILETING: A LITTLE
MOVING FROM LYING ON BACK TO SITTING ON SIDE OF FLAT BED WITH BEDRAILS: A LITTLE
PERSONAL GROOMING: A LITTLE
HELP NEEDED FOR BATHING: A LITTLE
TURNING FROM BACK TO SIDE WHILE IN FLAT BAD: A LITTLE
STANDING UP FROM CHAIR USING ARMS: A LOT
MOBILITY SCORE: 14
DRESSING REGULAR UPPER BODY CLOTHING: A LITTLE
DRESSING REGULAR LOWER BODY CLOTHING: A LITTLE
DAILY ACTIVITIY SCORE: 19

## 2025-01-12 ASSESSMENT — PAIN SCALES - GENERAL
PAINLEVEL_OUTOF10: 0 - NO PAIN

## 2025-01-12 ASSESSMENT — PAIN - FUNCTIONAL ASSESSMENT
PAIN_FUNCTIONAL_ASSESSMENT: 0-10

## 2025-01-12 NOTE — CARE PLAN
The patient's goals for the shift include PT    The clinical goals for the shift include Safety, monitor labs and vitals      Problem: Diabetes  Goal: Achieve decreasing blood glucose levels by end of shift  Outcome: Progressing  Goal: Increase stability of blood glucose readings by end of shift  Outcome: Progressing  Goal: Decrease in ketones present in urine by end of shift  Outcome: Progressing  Goal: Maintain electrolyte levels within acceptable range throughout shift  Outcome: Progressing  Goal: Maintain glucose levels >70mg/dl to <250mg/dl throughout shift  Outcome: Progressing  Goal: No changes in neurological exam by end of shift  Outcome: Progressing  Goal: Learn about and adhere to nutrition recommendations by end of shift  Outcome: Progressing  Goal: Vital signs within normal range for age by end of shift  Outcome: Progressing  Goal: Increase self care and/or family involovement by end of shift  Outcome: Progressing  Goal: Receive DSME education by end of shift  Outcome: Progressing     Problem: Fall/Injury  Goal: Not fall by end of shift  Outcome: Progressing  Goal: Be free from injury by end of the shift  Outcome: Progressing  Goal: Verbalize understanding of personal risk factors for fall in the hospital  Outcome: Progressing  Goal: Verbalize understanding of risk factor reduction measures to prevent injury from fall in the home  Outcome: Progressing  Goal: Use assistive devices by end of the shift  Outcome: Progressing  Goal: Pace activities to prevent fatigue by end of the shift  Outcome: Progressing

## 2025-01-12 NOTE — CARE PLAN
The patient's goals for the shift include PT    The clinical goals for the shift include safety

## 2025-01-12 NOTE — PROGRESS NOTES
"Natalya Lira is a 89 y.o. female on day 3 of admission presenting with Fall at home, initial encounter.    Subjective   Pt seen and examined.  No documented concerns/events overnight from nursing.  No complaints voiced.       Objective     Physical Exam  Constitutional:       General: She is not in acute distress.     Appearance: She is not ill-appearing or toxic-appearing.   Cardiovascular:      Rate and Rhythm: Normal rate and regular rhythm.      Pulses: Normal pulses.      Heart sounds: Normal heart sounds.   Pulmonary:      Effort: Pulmonary effort is normal. No respiratory distress.      Breath sounds: Normal breath sounds. No wheezing or rales.   Abdominal:      General: Bowel sounds are normal.      Palpations: Abdomen is soft.   Musculoskeletal:      Right lower leg: Edema present.      Left lower leg: Edema present.   Neurological:      General: No focal deficit present.      Mental Status: She is alert and oriented to person, place, and time.  Forgetful  Skin: small hematoma to LT side of forehead  Last Recorded Vitals  Blood pressure 123/50, pulse 63, temperature 36.6 °C (97.9 °F), temperature source Temporal, resp. rate 18, height 1.499 m (4' 11\"), weight 54.7 kg (120 lb 9.5 oz), SpO2 95%.  Intake/Output last 3 Shifts:  I/O last 3 completed shifts:  In: 1250 (22.9 mL/kg) [P.O.:1200; IV Piggyback:50]  Out: 1200 (21.9 mL/kg) [Urine:1200 (0.6 mL/kg/hr)]  Weight: 54.7 kg     Relevant Results  Scheduled medications  amLODIPine, 5 mg, oral, Daily  aspirin, 81 mg, oral, Daily  atorvastatin, 80 mg, oral, Nightly  cefTRIAXone, 1 g, intravenous, q24h  clopidogrel, 75 mg, oral, Daily  [START ON 1/13/2025] glimepiride, 4 mg, oral, Daily before breakfast  insulin glargine, 10 Units, subcutaneous, Nightly  insulin lispro, 0-10 Units, subcutaneous, TID AC  levothyroxine, 88 mcg, oral, Daily  pantoprazole, 20 mg, oral, Daily before breakfast      Continuous medications     PRN medications  PRN medications: dextrose, " dextrose, glucagon, glucagon                     Assessment/Plan     Fall  -fall precautions  -PT,OT recommend mod intensity  -will need SNF  -CT brain no acute findings        DM II  -monitor blood glucose  -diabetic diet  -ISS  -resume amaryl  -lantus 10 units  -Hgb A1C 10.0 (11/2024)     Hypothyroid  -continue levothyroxine     Recent CVA (11/2024)  -neurology following  -neuro checks  -continue asa, plavix, statin     HTN  -monitor blood pressure  -continue amlodipine        UTI  -IV ceftriaxone  -urine cx pending grwoing >100,000 cfu/ml E.coli susceptible to ceftriaxone     Plan   Above plan discussed with pt and she is in agreeement        Leatha Arnett, LORNE-CNP

## 2025-01-12 NOTE — PROGRESS NOTES
"Neurology Follow Up    Subjective:  Ms. Lira is a 89 y.o. female admitted 1/9/2025, LOS 3. Neurology is consulted for fall.     She reports doing fine today    Objective:  Blood pressure 124/63, pulse 72, temperature 36.7 °C (98.1 °F), temperature source Temporal, resp. rate 18, height 1.499 m (4' 11\"), weight 54.7 kg (120 lb 9.5 oz), SpO2 98%.      Reviewed relevant results, independent review/interpretation of imaging:   CT head shows no acute infarct. There is a left frontal scalp contusion.       Assessment:   Ms. Lira is a 89 y.o.  female recently hospitalized in November 2024 at [facility] for a right parietal lobe stroke associated with left facial droop and left arm weakness.  No mechanism was identified.  She now presents with a fall out of bed.  She remembers trying to get her leg up in the bed and then fell and remembers being on the floor and the paramedics coming.     There did not seem to be any neurological symptoms before or after the event.  However the patient is somewhat unsure on details.  Regardless her exam seems similar to what was described at the previous [facility] evaluation and the patient feels like she is at her baseline.     I do not think she had another cerebral vascular event, regardless I will need to talk with her granddaughter to get more information.  I have been unable to reach her family for additional information, we have reassessed her and she is doing fine.        Recommendations:  Continue with secondary stroke preventatives including aspirin Plavix and statin  Neurology will sign off      The patient was informed about the telehealth clinical encounter. Telehealth sessions are not being recorded and personal health information is protected. All questions were answered and verbal consent from the patient (or guardian) was obtained to proceed.    Consult completed by: TELEPHONE and VIDEO interactive communication was used to provide this telehealth service. " Time includes consultation with ED provider and extensive review of data- history, medical records, lab/radiology/medical test results, neuroimaging studies:  25 minutes was spent in FOLLOW-UP telehealth consultation       Adán Alvarez MD  Neurology and Neuromuscular Medicine   [facility]  [facility]

## 2025-01-13 LAB
ATRIAL RATE: 65 BPM
GLUCOSE BLD MANUAL STRIP-MCNC: 114 MG/DL (ref 74–99)
GLUCOSE BLD MANUAL STRIP-MCNC: 183 MG/DL (ref 74–99)
GLUCOSE BLD MANUAL STRIP-MCNC: 193 MG/DL (ref 74–99)
GLUCOSE BLD MANUAL STRIP-MCNC: 98 MG/DL (ref 74–99)
P AXIS: 84 DEGREES
P OFFSET: 178 MS
P ONSET: 145 MS
PR INTERVAL: 144 MS
Q ONSET: 217 MS
QRS COUNT: 11 BEATS
QRS DURATION: 104 MS
QT INTERVAL: 452 MS
QTC CALCULATION(BAZETT): 470 MS
QTC FREDERICIA: 464 MS
R AXIS: -74 DEGREES
T AXIS: 15 DEGREES
T OFFSET: 443 MS
VENTRICULAR RATE: 65 BPM

## 2025-01-13 PROCEDURE — 1100000001 HC PRIVATE ROOM DAILY

## 2025-01-13 PROCEDURE — 99232 SBSQ HOSP IP/OBS MODERATE 35: CPT | Performed by: NURSE PRACTITIONER

## 2025-01-13 PROCEDURE — 2500000004 HC RX 250 GENERAL PHARMACY W/ HCPCS (ALT 636 FOR OP/ED): Performed by: NURSE PRACTITIONER

## 2025-01-13 PROCEDURE — 82947 ASSAY GLUCOSE BLOOD QUANT: CPT

## 2025-01-13 PROCEDURE — 2500000002 HC RX 250 W HCPCS SELF ADMINISTERED DRUGS (ALT 637 FOR MEDICARE OP, ALT 636 FOR OP/ED): Performed by: NURSE PRACTITIONER

## 2025-01-13 PROCEDURE — 2500000001 HC RX 250 WO HCPCS SELF ADMINISTERED DRUGS (ALT 637 FOR MEDICARE OP): Performed by: NURSE PRACTITIONER

## 2025-01-13 RX ADMIN — FUROSEMIDE 20 MG: 20 TABLET ORAL at 08:53

## 2025-01-13 RX ADMIN — GLIMEPIRIDE 4 MG: 2 TABLET ORAL at 08:53

## 2025-01-13 RX ADMIN — AMLODIPINE BESYLATE 5 MG: 5 TABLET ORAL at 08:53

## 2025-01-13 RX ADMIN — CLOPIDOGREL BISULFATE 75 MG: 75 TABLET ORAL at 08:53

## 2025-01-13 RX ADMIN — PANTOPRAZOLE SODIUM 20 MG: 20 TABLET, DELAYED RELEASE ORAL at 06:15

## 2025-01-13 RX ADMIN — INSULIN LISPRO 2 UNITS: 100 INJECTION, SOLUTION INTRAVENOUS; SUBCUTANEOUS at 11:55

## 2025-01-13 RX ADMIN — INSULIN LISPRO 2 UNITS: 100 INJECTION, SOLUTION INTRAVENOUS; SUBCUTANEOUS at 18:00

## 2025-01-13 RX ADMIN — CEFTRIAXONE SODIUM 1 G: 1 INJECTION, SOLUTION INTRAVENOUS at 11:55

## 2025-01-13 RX ADMIN — LEVOTHYROXINE SODIUM 88 MCG: 88 TABLET ORAL at 06:15

## 2025-01-13 RX ADMIN — ASPIRIN 81 MG: 81 TABLET, COATED ORAL at 08:53

## 2025-01-13 RX ADMIN — ATORVASTATIN CALCIUM 80 MG: 80 TABLET, FILM COATED ORAL at 21:42

## 2025-01-13 ASSESSMENT — COGNITIVE AND FUNCTIONAL STATUS - GENERAL
CLIMB 3 TO 5 STEPS WITH RAILING: A LOT
HELP NEEDED FOR BATHING: A LITTLE
TOILETING: A LITTLE
WALKING IN HOSPITAL ROOM: A LOT
MOVING TO AND FROM BED TO CHAIR: A LOT
PERSONAL GROOMING: A LITTLE
MOVING FROM LYING ON BACK TO SITTING ON SIDE OF FLAT BED WITH BEDRAILS: A LITTLE
CLIMB 3 TO 5 STEPS WITH RAILING: A LOT
TOILETING: A LITTLE
DRESSING REGULAR UPPER BODY CLOTHING: A LITTLE
DRESSING REGULAR LOWER BODY CLOTHING: A LITTLE
DAILY ACTIVITIY SCORE: 19
DRESSING REGULAR UPPER BODY CLOTHING: A LITTLE
MOVING TO AND FROM BED TO CHAIR: A LOT
TURNING FROM BACK TO SIDE WHILE IN FLAT BAD: A LITTLE
MOVING FROM LYING ON BACK TO SITTING ON SIDE OF FLAT BED WITH BEDRAILS: A LITTLE
DAILY ACTIVITIY SCORE: 19
HELP NEEDED FOR BATHING: A LITTLE
MOBILITY SCORE: 14
DRESSING REGULAR LOWER BODY CLOTHING: A LITTLE
STANDING UP FROM CHAIR USING ARMS: A LOT
WALKING IN HOSPITAL ROOM: A LOT
TURNING FROM BACK TO SIDE WHILE IN FLAT BAD: A LITTLE
PERSONAL GROOMING: A LITTLE
MOBILITY SCORE: 14
STANDING UP FROM CHAIR USING ARMS: A LOT

## 2025-01-13 ASSESSMENT — PAIN - FUNCTIONAL ASSESSMENT: PAIN_FUNCTIONAL_ASSESSMENT: 0-10

## 2025-01-13 ASSESSMENT — PAIN SCALES - GENERAL
PAINLEVEL_OUTOF10: 0 - NO PAIN
PAINLEVEL_OUTOF10: 0 - NO PAIN

## 2025-01-13 NOTE — CARE PLAN
The patient's goals for the shift include PT    The clinical goals for the shift include maintain safety    Problem: Diabetes  Goal: Achieve decreasing blood glucose levels by end of shift  Outcome: Progressing  Goal: Increase stability of blood glucose readings by end of shift  Outcome: Progressing  Goal: Decrease in ketones present in urine by end of shift  Outcome: Progressing  Goal: Maintain electrolyte levels within acceptable range throughout shift  Outcome: Progressing  Goal: Maintain glucose levels >70mg/dl to <250mg/dl throughout shift  Outcome: Progressing  Goal: No changes in neurological exam by end of shift  Outcome: Progressing  Goal: Learn about and adhere to nutrition recommendations by end of shift  Outcome: Progressing  Goal: Vital signs within normal range for age by end of shift  Outcome: Progressing  Goal: Increase self care and/or family involovement by end of shift  Outcome: Progressing  Goal: Receive DSME education by end of shift  Outcome: Progressing     Problem: Fall/Injury  Goal: Not fall by end of shift  Outcome: Progressing  Goal: Be free from injury by end of the shift  Outcome: Progressing  Goal: Verbalize understanding of personal risk factors for fall in the hospital  Outcome: Progressing  Goal: Verbalize understanding of risk factor reduction measures to prevent injury from fall in the home  Outcome: Progressing  Goal: Use assistive devices by end of the shift  Outcome: Progressing  Goal: Pace activities to prevent fatigue by end of the shift  Outcome: Progressing

## 2025-01-13 NOTE — CARE PLAN
Problem: Diabetes  Goal: Achieve decreasing blood glucose levels by end of shift  Outcome: Progressing  Goal: Increase stability of blood glucose readings by end of shift  Outcome: Progressing  Goal: Decrease in ketones present in urine by end of shift  Outcome: Progressing  Goal: Maintain electrolyte levels within acceptable range throughout shift  Outcome: Progressing  Goal: Maintain glucose levels >70mg/dl to <250mg/dl throughout shift  Outcome: Progressing  Goal: No changes in neurological exam by end of shift  Outcome: Progressing  Goal: Learn about and adhere to nutrition recommendations by end of shift  Outcome: Progressing  Goal: Vital signs within normal range for age by end of shift  Outcome: Progressing  Goal: Increase self care and/or family involovement by end of shift  Outcome: Progressing  Goal: Receive DSME education by end of shift  Outcome: Progressing     Problem: Fall/Injury  Goal: Not fall by end of shift  Outcome: Progressing  Goal: Be free from injury by end of the shift  Outcome: Progressing  Goal: Verbalize understanding of personal risk factors for fall in the hospital  Outcome: Progressing  Goal: Verbalize understanding of risk factor reduction measures to prevent injury from fall in the home  Outcome: Progressing  Goal: Use assistive devices by end of the shift  Outcome: Progressing  Goal: Pace activities to prevent fatigue by end of the shift  Outcome: Progressing   The patient's goals for the shift include PT    The clinical goals for the shift include maintain safety    Over the shift, the patient did not make progress toward the following goals. Barriers to progression include . Recommendations to address these barriers include .

## 2025-01-13 NOTE — PROGRESS NOTES
Natalya Lira is a 89 y.o. female on day 4 of admission presenting with Fall at home, initial encounter.      Subjective   Patient is doing well, resting comfortably in bed. She denies any pain or sob.        Objective     Last Recorded Vitals  /60 (BP Location: Left arm, Patient Position: Lying)   Pulse 70   Temp 36.5 °C (97.7 °F) (Temporal)   Resp 16   Wt 54.7 kg (120 lb 9.5 oz)   SpO2 99%   Intake/Output last 3 Shifts:    Intake/Output Summary (Last 24 hours) at 1/13/2025 1640  Last data filed at 1/13/2025 1543  Gross per 24 hour   Intake 850 ml   Output 500 ml   Net 350 ml       Admission Weight  Weight: 54.7 kg (120 lb 9.5 oz) (01/09/25 0742)    Daily Weight  01/09/25 : 54.7 kg (120 lb 9.5 oz)    Image Results      Physical Exam  Constitutional:       Appearance: Normal appearance.   Cardiovascular:      Rate and Rhythm: Normal rate and regular rhythm.      Pulses: Normal pulses.      Heart sounds: Normal heart sounds.   Pulmonary:      Effort: Pulmonary effort is normal.      Breath sounds: Normal breath sounds.   Abdominal:      General: Bowel sounds are normal.      Palpations: Abdomen is soft.   Musculoskeletal:         General: Normal range of motion.   Skin:     General: Skin is warm and dry.   Neurological:      Mental Status: She is alert and oriented to person, place, and time.         Relevant Results             Results for orders placed or performed during the hospital encounter of 01/09/25 (from the past 24 hours)   POCT GLUCOSE   Result Value Ref Range    POCT Glucose 113 (H) 74 - 99 mg/dL   POCT GLUCOSE   Result Value Ref Range    POCT Glucose 114 (H) 74 - 99 mg/dL   POCT GLUCOSE   Result Value Ref Range    POCT Glucose 183 (H) 74 - 99 mg/dL     *Note: Due to a large number of results and/or encounters for the requested time period, some results have not been displayed. A complete set of results can be found in Results Review.       Assessment/Plan                  Assessment &  Plan  Fall at home, initial encounter    Fall  Fall precautions  PT,OT recommend mod intensity  Discharge to SNF  CT brain no acute findings      DM II  Monitor blood glucose  Diabetic diet  ISS  resume amaryl  lantus 10 units  Hgb A1C 10.0 (11/2024)     Hypothyroid  continue levothyroxine     Recent CVA (11/2024)  neurology following  neuro checks  continue asa, plavix, statin     HTN  monitor blood pressure  continue amlodipine        UTI  IV ceftriaxone  urine cx pending grwoing >100,000 cfu/ml E.coli susceptible to ceftriaxone     Plan of Care   Patient to go to SNF in next 24-48 hours  Plan of care discussed with patient and collaborating physician.              Kierra Pearson, APRN-CNP

## 2025-01-13 NOTE — PROGRESS NOTES
01/13/25 1501   Discharge Planning   Expected Discharge Disposition SNF  (pending acceptance CV)   Does the patient need discharge transport arranged? Yes   RoundTrip coordination needed? Yes   Has discharge transport been arranged? No     Pending acceptance CV.   Will need precert started if accepted.

## 2025-01-14 VITALS
HEIGHT: 59 IN | HEART RATE: 68 BPM | WEIGHT: 120.59 LBS | OXYGEN SATURATION: 96 % | TEMPERATURE: 98.2 F | RESPIRATION RATE: 16 BRPM | DIASTOLIC BLOOD PRESSURE: 55 MMHG | BODY MASS INDEX: 24.31 KG/M2 | SYSTOLIC BLOOD PRESSURE: 117 MMHG

## 2025-01-14 LAB
ANION GAP SERPL CALCULATED.3IONS-SCNC: 9 MMOL/L (ref 10–20)
BUN SERPL-MCNC: 17 MG/DL (ref 6–23)
CALCIUM SERPL-MCNC: 7.8 MG/DL (ref 8.6–10.3)
CHLORIDE SERPL-SCNC: 100 MMOL/L (ref 98–107)
CO2 SERPL-SCNC: 28 MMOL/L (ref 21–32)
CREAT SERPL-MCNC: 1.03 MG/DL (ref 0.5–1.05)
EGFRCR SERPLBLD CKD-EPI 2021: 52 ML/MIN/1.73M*2
ERYTHROCYTE [DISTWIDTH] IN BLOOD BY AUTOMATED COUNT: 14.6 % (ref 11.5–14.5)
GLUCOSE BLD MANUAL STRIP-MCNC: 198 MG/DL (ref 74–99)
GLUCOSE BLD MANUAL STRIP-MCNC: 254 MG/DL (ref 74–99)
GLUCOSE BLD MANUAL STRIP-MCNC: 96 MG/DL (ref 74–99)
GLUCOSE SERPL-MCNC: 83 MG/DL (ref 74–99)
HCT VFR BLD AUTO: 30.9 % (ref 36–46)
HGB BLD-MCNC: 10.2 G/DL (ref 12–16)
MCH RBC QN AUTO: 31.7 PG (ref 26–34)
MCHC RBC AUTO-ENTMCNC: 33 G/DL (ref 32–36)
MCV RBC AUTO: 96 FL (ref 80–100)
NRBC BLD-RTO: 0 /100 WBCS (ref 0–0)
PLATELET # BLD AUTO: 207 X10*3/UL (ref 150–450)
POTASSIUM SERPL-SCNC: 3.7 MMOL/L (ref 3.5–5.3)
RBC # BLD AUTO: 3.22 X10*6/UL (ref 4–5.2)
SODIUM SERPL-SCNC: 133 MMOL/L (ref 136–145)
WBC # BLD AUTO: 6.3 X10*3/UL (ref 4.4–11.3)

## 2025-01-14 PROCEDURE — 99238 HOSP IP/OBS DSCHRG MGMT 30/<: CPT | Performed by: NURSE PRACTITIONER

## 2025-01-14 PROCEDURE — 97535 SELF CARE MNGMENT TRAINING: CPT | Mod: GO,CO

## 2025-01-14 PROCEDURE — 97116 GAIT TRAINING THERAPY: CPT | Mod: GP,CQ

## 2025-01-14 PROCEDURE — 36415 COLL VENOUS BLD VENIPUNCTURE: CPT | Performed by: NURSE PRACTITIONER

## 2025-01-14 PROCEDURE — 2500000002 HC RX 250 W HCPCS SELF ADMINISTERED DRUGS (ALT 637 FOR MEDICARE OP, ALT 636 FOR OP/ED): Performed by: NURSE PRACTITIONER

## 2025-01-14 PROCEDURE — 2500000004 HC RX 250 GENERAL PHARMACY W/ HCPCS (ALT 636 FOR OP/ED): Performed by: NURSE PRACTITIONER

## 2025-01-14 PROCEDURE — 97530 THERAPEUTIC ACTIVITIES: CPT | Mod: GP,CQ

## 2025-01-14 PROCEDURE — 82947 ASSAY GLUCOSE BLOOD QUANT: CPT

## 2025-01-14 PROCEDURE — 85027 COMPLETE CBC AUTOMATED: CPT | Performed by: NURSE PRACTITIONER

## 2025-01-14 PROCEDURE — 80048 BASIC METABOLIC PNL TOTAL CA: CPT | Performed by: NURSE PRACTITIONER

## 2025-01-14 PROCEDURE — 97110 THERAPEUTIC EXERCISES: CPT | Mod: GP,CQ

## 2025-01-14 PROCEDURE — 2500000001 HC RX 250 WO HCPCS SELF ADMINISTERED DRUGS (ALT 637 FOR MEDICARE OP): Performed by: NURSE PRACTITIONER

## 2025-01-14 RX ORDER — PANTOPRAZOLE SODIUM 20 MG/1
20 TABLET, DELAYED RELEASE ORAL
Start: 2025-01-15

## 2025-01-14 RX ORDER — GLIMEPIRIDE 4 MG/1
4 TABLET ORAL
Start: 2025-01-15

## 2025-01-14 RX ORDER — LEVOTHYROXINE SODIUM 88 UG/1
88 TABLET ORAL DAILY
Start: 2025-01-15

## 2025-01-14 RX ADMIN — INSULIN LISPRO 6 UNITS: 100 INJECTION, SOLUTION INTRAVENOUS; SUBCUTANEOUS at 13:02

## 2025-01-14 RX ADMIN — FUROSEMIDE 20 MG: 20 TABLET ORAL at 09:50

## 2025-01-14 RX ADMIN — ASPIRIN 81 MG: 81 TABLET, COATED ORAL at 09:50

## 2025-01-14 RX ADMIN — AMLODIPINE BESYLATE 5 MG: 5 TABLET ORAL at 09:50

## 2025-01-14 RX ADMIN — CLOPIDOGREL BISULFATE 75 MG: 75 TABLET ORAL at 09:50

## 2025-01-14 RX ADMIN — GLIMEPIRIDE 4 MG: 2 TABLET ORAL at 09:56

## 2025-01-14 RX ADMIN — LEVOTHYROXINE SODIUM 88 MCG: 88 TABLET ORAL at 06:29

## 2025-01-14 RX ADMIN — CEFTRIAXONE SODIUM 1 G: 1 INJECTION, SOLUTION INTRAVENOUS at 11:41

## 2025-01-14 RX ADMIN — PANTOPRAZOLE SODIUM 20 MG: 20 TABLET, DELAYED RELEASE ORAL at 06:29

## 2025-01-14 ASSESSMENT — COGNITIVE AND FUNCTIONAL STATUS - GENERAL
PERSONAL GROOMING: A LITTLE
EATING MEALS: A LITTLE
DRESSING REGULAR LOWER BODY CLOTHING: A LOT
MOVING FROM LYING ON BACK TO SITTING ON SIDE OF FLAT BED WITH BEDRAILS: A LITTLE
DAILY ACTIVITIY SCORE: 19
PERSONAL GROOMING: A LITTLE
TOILETING: A LOT
DRESSING REGULAR LOWER BODY CLOTHING: A LITTLE
TURNING FROM BACK TO SIDE WHILE IN FLAT BAD: A LITTLE
MOVING FROM LYING ON BACK TO SITTING ON SIDE OF FLAT BED WITH BEDRAILS: A LITTLE
TOILETING: A LITTLE
DAILY ACTIVITIY SCORE: 15
CLIMB 3 TO 5 STEPS WITH RAILING: A LOT
MOBILITY SCORE: 15
DRESSING REGULAR UPPER BODY CLOTHING: A LITTLE
MOVING TO AND FROM BED TO CHAIR: A LOT
TURNING FROM BACK TO SIDE WHILE IN FLAT BAD: A LOT
STANDING UP FROM CHAIR USING ARMS: A LITTLE
CLIMB 3 TO 5 STEPS WITH RAILING: TOTAL
HELP NEEDED FOR BATHING: A LOT
STANDING UP FROM CHAIR USING ARMS: A LOT
MOBILITY SCORE: 14
HELP NEEDED FOR BATHING: A LITTLE
WALKING IN HOSPITAL ROOM: A LITTLE
MOVING TO AND FROM BED TO CHAIR: A LITTLE
WALKING IN HOSPITAL ROOM: A LOT
DRESSING REGULAR UPPER BODY CLOTHING: A LITTLE

## 2025-01-14 ASSESSMENT — PAIN SCALES - GENERAL: PAINLEVEL_OUTOF10: 0 - NO PAIN

## 2025-01-14 ASSESSMENT — PAIN - FUNCTIONAL ASSESSMENT
PAIN_FUNCTIONAL_ASSESSMENT: 0-10
PAIN_FUNCTIONAL_ASSESSMENT: 0-10

## 2025-01-14 ASSESSMENT — ACTIVITIES OF DAILY LIVING (ADL)
BATHING_WHERE_ASSESSED: OTHER (COMMENT)
BATHING_LEVEL_OF_ASSISTANCE: MAXIMUM ASSISTANCE
HOME_MANAGEMENT_TIME_ENTRY: 28

## 2025-01-14 NOTE — DISCHARGE SUMMARY
Discharge Diagnosis  Fall at home, initial encounter    Issues Requiring Follow-Up  Post hospital stay follow-up with PCP    Discharge Meds     Medication List      CONTINUE taking these medications     acetaminophen 325 mg tablet; Commonly known as: Tylenol   amLODIPine 5 mg tablet; Commonly known as: Norvasc   aspirin 81 mg EC tablet   atorvastatin 80 mg tablet; Commonly known as: Lipitor   clopidogrel 75 mg tablet; Commonly known as: Plavix; Take 1 tablet (75   mg) by mouth once daily.   Cozaar 100 mg tablet; Generic drug: losartan   cranberry extract 250 mg capsule   cyanocobalamin 1,000 mcg tablet; Commonly known as: Vitamin B-12   ferrous gluconate 324 (38 Fe) mg tablet; Commonly known as: Fergon   folic acid 1 mg tablet; Commonly known as: Folvite   FreeStyle Kamar 2 Sensor kit; Generic drug: flash glucose sensor kit   furosemide 20 mg tablet; Commonly known as: Lasix; Take 1 tablet (20 mg)   by mouth once daily.   glimepiride 4 mg tablet; Commonly known as: Amaryl; Take 1 tablet (4 mg)   by mouth once daily in the morning. Take before meals.; Start taking on:   January 15, 2025   Levemir FlexPen 100 unit/mL (3 mL) pen; Generic drug: insulin detemir   levothyroxine 88 mcg tablet; Commonly known as: Synthroid, Levoxyl; Take   1 tablet (88 mcg) by mouth early in the morning.. Take on an empty stomach   at the same time each day, either 30 to 60 minutes prior to breakfast;   Start taking on: January 15, 2025   methotrexate 2.5 mg tablet; Commonly known as: Trexall   mirtazapine 7.5 mg tablet; Commonly known as: Remeron   Oyster Shell Calcium-Vit D3 500 mg-5 mcg (200 unit) tablet; Generic   drug: calcium carbonate-vitamin D3; Take 1 tablet by mouth 2 times a day.   pantoprazole 20 mg EC tablet; Commonly known as: ProtoNix; Take 1 tablet   (20 mg) by mouth once daily in the morning. Take before meals. Do not   crush, chew, or split.; Start taking on: January 15, 2025   potassium chloride CR 20 mEq ER tablet;  Commonly known as: Klor-Con M20;   Take 1 tablet (20 mEq) by mouth once daily. Do not crush or chew.     STOP taking these medications     Premarin vaginal cream; Generic drug: estrogens (conjugated)   Stool Softener 100 mg capsule; Generic drug: docusate sodium       Test Results Pending At Discharge  Pending Labs       Order Current Status    Extra Urine Gray Tube Collected (01/09/25 1141)    Urinalysis with Reflex Culture and Microscopic In process            Hospital Course   Patient is a 89-year-old female who presented with a fall.  At presentation patient could not recall what happened to her but she thinks that she rolled out of bed.  She she did have a slight hematoma to the left side of her scalp. CT of the head was unremarkable, except for a mild left frontal scalp soft tissue swelling/contusion.  Other imaging obtained, x-ray of left hip and pelvis and T of the pelvis are unremarkable for acute injury.  Severe arthritic changes are noted.  Patient did have a UTI, her urine culture grew multidrug-resistant E. coli.  Patient was treated with 6 days of ceftriaxone IV which was an acceptable treatment.  Patient is in stable condition and is being discharged to SNF today.    Pertinent Physical Exam At Time of Discharge  Physical Exam    Outpatient Follow-Up  No future appointments.      Kierra Pearson, APRN-CNP

## 2025-01-14 NOTE — PROGRESS NOTES
Physical Therapy    Physical Therapy Treatment    Patient Name: Natalya Lira  MRN: [id number]  Department: [address]  Room: [address] [place] [address]  Today's Date: 1/14/2025  Time Calculation  Start Time: 0856  Stop Time: 0935  Time Calculation (min): 39 min         Assessment/Plan   PT Assessment  PT Assessment Results: Decreased strength, Decreased endurance, Impaired balance, Decreased mobility, Impaired judgement, Decreased safety awareness, Decreased coordination  Rehab Prognosis: Good  Barriers to Discharge Home: Physical needs, Caregiver assistance  Caregiver Assistance: Caregiver assistance needed per identified barriers - however, level of patient's required assistance exceeds assistance available at home  Physical Needs: Intermittent mobility assistance needed, High falls risk due to function or environment  Evaluation/Treatment Tolerance: Patient limited by fatigue  Medical Staff Made Aware: Yes  Strengths: Premorbid level of function  Barriers to Participation: Comorbidities  End of Session Communication: Bedside nurse  Assessment Comment: Pt fatigues easily. Gives good effort. Gait very slow, effortful, mild unsteadiness. Pt is a falls risk.  End of Session Patient Position: Up in chair, Alarm on     PT Plan  Treatment/Interventions: Bed mobility, Transfer training, Gait training, Balance training, Strengthening, Endurance training, Therapeutic exercise, Therapeutic activity  PT Plan: Ongoing PT  PT Frequency: 4 times per week  PT Discharge Recommendations: Moderate intensity level of continued care  Equipment Recommended upon Discharge: Wheeled walker  PT Recommended Transfer Status: Assist x1, Assistive device  PT - OK to Discharge: Yes      General Visit Information:   PT  Visit  PT Received On: 01/14/25  General  Reason for Referral: Impaired mobility; found down, UTI  Referred By: Dr Christianson  Past Medical History Relevant to Rehab: OP, HTN, hypothyroid, neuropathy, CHF, RA  Prior to Session Communication:  Bedside nurse  Patient Position Received: Bed, 3 rail up, Alarm on  Preferred Learning Style: verbal, visual  General Comment: Pt agreeable to therapy.    Subjective   Precautions:  Precautions  Medical Precautions: Fall precautions        Objective   Pain:  Pain Assessment  Pain Assessment: 0-10    Cognition:  Cognition  Orientation Level: Disoriented to time  Following Commands: Follows one step commands with increased time  Cognition Comments: Pleasant, cooperative. Follows one step commands.  Insight: Mild  Processing Speed: Delayed     Postural Control:  Static Sitting Balance  Static Sitting-Balance Support: Feet supported  Static Sitting-Level of Assistance: Close supervision  Static Sitting-Comment/Number of Minutes: Sitting at edge of bed. Rounded shoulders    Activity Tolerance:  Activity Tolerance  Endurance: Decreased tolerance for upright activites  Activity Tolerance Comments: fatigues easily    Treatments:  Therapeutic Exercise  Therapeutic Exercise Performed: Yes  Therapeutic Exercise Activity 1: Pt performed seated bilat LE ankle pumps, heel raises, LAQ, hip flexion, kirby hip adduction and resisted hip abduction x10 reps each. Rest breaks as needed.    Bed Mobility 1  Bed Mobility 1: Rolling right, Rolling left  Level of Assistance 1: Minimum assistance  Bed Mobility Comments 1: Min assist to roll right <> left, dependent for hygiene and donning new brief.  Bed Mobility 2  Bed Mobility  2: Supine to sitting  Level of Assistance 2: Minimum assistance  Bed Mobility Comments 2: Pt able to bring bilat LE's off bed, min assist with trunk up. Mod assist using draw sheet to scoot pt to edge of bed.    Ambulation/Gait Training 1  Surface 1: Level tile  Device 1: Rolling walker  Assistance 1: Minimum assistance  Quality of Gait 1: Diminished heel strike, Narrow base of support, Inconsistent stride length, Decreased step length, Shuffling gait, Forward flexed posture  Comments/Distance (ft) 1: Pt ambulated  with RW ~12' x2 trials min assist of 1 and seated rest break between trials due to fatigue. Pt ambulated at slow pace, shuffled steps. Verbal cues to improve posture, head up as able.    Transfer 1  Transfer From 1: Bed to  Transfer to 1: Stand  Technique 1: Sit to stand  Transfer Device 1: Walker  Transfer Level of Assistance 1: Minimum assistance, Minimal verbal cues  Trials/Comments 1: Verbal cues to push from bed sit to stand.  Transfers 2  Transfer From 2: Stand to  Transfer to 2: Sit, Chair without arms  Technique 2: Stand to sit, Sit to stand  Transfer Device 2: Walker  Transfer Level of Assistance 2: Minimum assistance  Transfers 3  Transfer From 3: Stand to  Transfer to 3: Sit, Chair with arms  Technique 3: Stand to sit  Transfer Device 3: Walker  Transfer Level of Assistance 3: Minimum assistance, Minimal verbal cues  Trials/Comments 3: Verbal cues to reach for chair arms stand to sit.    Outcome Measures:  [facility] Basic Mobility  Turning from your back to your side while in a flat bed without using bedrails: A little  Moving from lying on your back to sitting on the side of a flat bed without using bedrails: A lot  Moving to and from bed to chair (including a wheelchair): A little  Standing up from a chair using your arms (e.g. wheelchair or bedside chair): A little  To walk in hospital room: A little  Climbing 3-5 steps with railing: Total  Basic Mobility - Total Score: 15    Education Documentation  Precautions, taught by Jennifer Cesar PTA at 1/14/2025  9:51 AM.  Learner: Patient  Readiness: Acceptance  Method: Explanation  Response: Verbalizes Understanding, Needs Reinforcement  Comment: safety with transfers and ambulation.    Body Mechanics, taught by Jennifer Cesar PTA at 1/14/2025  9:51 AM.  Learner: Patient  Readiness: Acceptance  Method: Explanation  Response: Verbalizes Understanding, Needs Reinforcement  Comment: safety with transfers and ambulation.    Mobility Training, taught by Jennifer Cesar  PTA at 1/14/2025  9:51 AM.  Learner: Patient  Readiness: Acceptance  Method: Explanation  Response: Verbalizes Understanding, Needs Reinforcement  Comment: safety with transfers and ambulation.    Education Comments  No comments found.        OP EDUCATION:       Encounter Problems       Encounter Problems (Active)       Balance       dynamic (Progressing)       Start:  01/10/25    Expected End:  01/24/25       No LOB with dynamic activities, UE support of walker, supervision            Mobility       bed mobility (Progressing)       Start:  01/10/25    Expected End:  01/24/25       Pt will perform sup to/from sit transfer with supervision          ambulation (Progressing)       Start:  01/10/25    Expected End:  01/24/25       Pt will amb > 75  ft with wheeled walker and mod independence             PT Transfers       sit to stand (Progressing)       Start:  01/10/25    Expected End:  01/24/25       Pt will perform sit to stand transfer with walker and supervision         bed to chair (Progressing)       Start:  01/10/25    Expected End:  01/24/25       Pt will perform bed to chair transfer with walker and supervision            Safety       LTG - Patient will utilize safety techniques (Progressing)       Start:  01/10/25    Expected End:  01/24/25

## 2025-01-14 NOTE — PROGRESS NOTES
Occupational Therapy    OT Treatment    Patient Name: Natalya Lira  MRN: [id number]  Department: [address]  Room: [address] [place] [address]  Today's Date: 1/14/2025  Time Calculation  Start Time: 0950  Stop Time: 1018  Time Calculation (min): 28 min        Assessment:  OT Assessment: Pt demonstrating improvement with transfers this session, limited with fatigue, falling asleep at times. Continue to recommend OT services this acute stay.  Prognosis: Good  Barriers to Discharge Home: Physical needs  Physical Needs: 24hr mobility assistance needed, 24hr ADL assistance needed, High falls risk due to function or environment  Evaluation/Treatment Tolerance: Patient limited by fatigue  End of Session Patient Position: Up in chair, Alarm on (All needs in reach.)  OT Assessment Results: Decreased ADL status, Decreased upper extremity strength, Decreased endurance, Decreased functional mobility, Decreased IADLs  Prognosis: Good  Evaluation/Treatment Tolerance: Patient limited by fatigue  Strengths: Premorbid level of function  Barriers to Participation: Comorbidities  Plan:  Treatment Interventions: ADL retraining, Functional transfer training  OT Frequency: 3 times per week  OT Discharge Recommendations: Moderate intensity level of continued care  Equipment Recommended upon Discharge: Wheeled walker  OT Recommended Transfer Status: Assist of 1  OT - OK to Discharge: Yes  Treatment Interventions: ADL retraining, Functional transfer training    Subjective   Previous Visit Info:  OT Last Visit  OT Received On: 01/14/25  General:  General  Reason for Referral: Impaired mobility; found down, UTI  Referred By: Dr Christianson  Past Medical History Relevant to Rehab: OP, HTN, hypothyroid, neuropathy, CHF, RA  Prior to Session Communication: Bedside nurse  Patient Position Received: Up in chair, Alarm on (All needs in reach.)  General Comment: Agreeable to treatment.  Precautions:  Medical Precautions: Fall precautions  Pain:  Pain Assessment  Pain  Assessment: 0-10    Objective    Cognition:  Cognition  Insight: Mild  Processing Speed: Delayed  Activities of Daily Living: Grooming  Grooming Level of Assistance: Minimum assistance  Grooming Where Assessed: Chair  Grooming Comments: pt not intitiating task with set up. Assist to open toothpaste, apply to toothbrush. assist to manage cup and spit basin.    UE Bathing  UE Bathing Level of Assistance: Moderate assistance  UE Bathing Where Assessed: Other (Comment) (chair)  UE Bathing Comments: sponge bathing, assist for thoroughness    LE Bathing  LE Bathing Level of Assistance: Maximum assistance  LE Bathing Where Assessed: Other (Comment) (chair)  LE Bathing Comments: assist with BLE seated, yaquelin area in stance  Bed Mobility/Transfers: Transfer 1  Transfer From 1: Chair with arms to  Transfer to 1: Chair with arms  Technique 1: Sit to stand, Stand to sit  Transfer Device 1: Walker  Transfer Level of Assistance 1: Minimum assistance (x2)  Trials/Comments 1: pt needing 2 attempts to complete with additional effort.  Outcome Measures:[facility] Daily Activity  Putting on and taking off regular lower body clothing: A lot  Bathing (including washing, rinsing, drying): A lot  Putting on and taking off regular upper body clothing: A little  Toileting, which includes using toilet, bedpan or urinal: A lot  Taking care of personal grooming such as brushing teeth: A little  Eating Meals: A little  Daily Activity - Total Score: 15    Education Documentation  ADL Training, taught by SALONI Jones at 1/14/2025 10:57 AM.  Learner: Patient  Readiness: Acceptance  Method: Explanation  Response: Demonstrated Understanding, Needs Reinforcement    Education Comments  No comments found.    Goals:  Encounter Problems       Encounter Problems (Active)       OT Goals       ADLs (Progressing)       Start:  01/10/25    Expected End:  01/24/25       Patient will complete ADL tasks, with modified independent using AE need in order to  increase patient's safety and independence with self-care tasks.           Functional transfers (Progressing)       Start:  01/10/25    Expected End:  01/24/25       Patient will complete functional transfers with modified independent using least restrictive device, in order to increase patient's safety and independence with daily tasks.           Activity tolerance (Progressing)       Start:  01/10/25    Expected End:  01/24/25       Patient will demonstrate the ability to participate in functional activity at least >/= 25 minutes in order to increase patient's safety and independence with daily tasks.

## 2025-01-14 NOTE — PROGRESS NOTES
01/14/25 1045   Discharge Planning   Expected Discharge Disposition SNF  ([facility])   Does the patient need discharge transport arranged? Yes   RoundTrip coordination needed? Yes   Has discharge transport been arranged? No     Submitted for Clifford aguilera.  Updated note sent.  [facility] on discharge.

## 2025-01-14 NOTE — CARE PLAN
Problem: Diabetes  Goal: Achieve decreasing blood glucose levels by end of shift  Outcome: Progressing  Goal: Increase stability of blood glucose readings by end of shift  Outcome: Progressing  Goal: Decrease in ketones present in urine by end of shift  Outcome: Progressing  Goal: Maintain electrolyte levels within acceptable range throughout shift  Outcome: Progressing  Goal: Maintain glucose levels >70mg/dl to <250mg/dl throughout shift  Outcome: Progressing  Goal: No changes in neurological exam by end of shift  Outcome: Progressing  Goal: Learn about and adhere to nutrition recommendations by end of shift  Outcome: Progressing  Goal: Vital signs within normal range for age by end of shift  Outcome: Progressing  Goal: Increase self care and/or family involovement by end of shift  Outcome: Progressing  Goal: Receive DSME education by end of shift  Outcome: Progressing     Problem: Fall/Injury  Goal: Not fall by end of shift  Outcome: Progressing  Goal: Be free from injury by end of the shift  Outcome: Progressing  Goal: Verbalize understanding of personal risk factors for fall in the hospital  Outcome: Progressing  Goal: Verbalize understanding of risk factor reduction measures to prevent injury from fall in the home  Outcome: Progressing  Goal: Use assistive devices by end of the shift  Outcome: Progressing  Goal: Pace activities to prevent fatigue by end of the shift  Outcome: Progressing   The patient's goals for the shift include PT    The clinical goals for the shift include maintain safety    Over the shift, the patient did not make progress toward the following goals. Barriers to progression include weakness. Recommendations to address these barriers include Pt/OT.

## 2025-01-14 NOTE — PROGRESS NOTES
01/14/25 1412   Discharge Planning   Expected Discharge Disposition Short Term A  ([facility])   Does the patient need discharge transport arranged? Yes   RoundTrip coordination needed? Yes   Has discharge transport been arranged? Yes   What day is the transport expected? 01/14/25   What time is the transport expected? 1700     5 pm  going to [facility].   Granddaughter, Phyllis, informed of time.

## 2025-01-14 NOTE — NURSING NOTE
Assumed care of patient from previous shift RN. Pt resting in bed, denies any needs. Call bell within reach, call bell within reach.